# Patient Record
Sex: FEMALE | Race: WHITE | NOT HISPANIC OR LATINO | Employment: OTHER | URBAN - METROPOLITAN AREA
[De-identification: names, ages, dates, MRNs, and addresses within clinical notes are randomized per-mention and may not be internally consistent; named-entity substitution may affect disease eponyms.]

---

## 2018-10-25 ENCOUNTER — TELEPHONE (OUTPATIENT)
Dept: CARDIOLOGY | Facility: CLINIC | Age: 68
End: 2018-10-25

## 2018-10-25 NOTE — TELEPHONE ENCOUNTER
----- Message from Erich Swann sent at 10/25/2018  2:36 PM CDT -----  Contact: Breanne  Type: Needs Medical Advice    Who Called:  Patient  Best Call Back Number: [phone]  Additional Information: Patient would like to see Dr. Subramanian as new patients.

## 2018-11-05 ENCOUNTER — TELEPHONE (OUTPATIENT)
Dept: CARDIOLOGY | Facility: CLINIC | Age: 68
End: 2018-11-05

## 2018-11-05 ENCOUNTER — OFFICE VISIT (OUTPATIENT)
Dept: CARDIOLOGY | Facility: CLINIC | Age: 68
End: 2018-11-05
Payer: COMMERCIAL

## 2018-11-05 VITALS
DIASTOLIC BLOOD PRESSURE: 65 MMHG | SYSTOLIC BLOOD PRESSURE: 126 MMHG | HEART RATE: 78 BPM | BODY MASS INDEX: 38.67 KG/M2 | WEIGHT: 218.25 LBS | HEIGHT: 63 IN

## 2018-11-05 DIAGNOSIS — R06.02 SOB (SHORTNESS OF BREATH): Primary | ICD-10-CM

## 2018-11-05 DIAGNOSIS — Z00.00 ROUTINE CHECK-UP: ICD-10-CM

## 2018-11-05 DIAGNOSIS — R06.02 SHORTNESS OF BREATH: ICD-10-CM

## 2018-11-05 DIAGNOSIS — Z00.00 ROUTINE CHECK-UP: Primary | ICD-10-CM

## 2018-11-05 DIAGNOSIS — E78.00 HYPERCHOLESTEROLEMIA: ICD-10-CM

## 2018-11-05 DIAGNOSIS — R01.1 UNDIAGNOSED CARDIAC MURMURS: ICD-10-CM

## 2018-11-05 DIAGNOSIS — E66.01 SEVERE OBESITY (BMI 35.0-39.9) WITH COMORBIDITY: ICD-10-CM

## 2018-11-05 DIAGNOSIS — I10 ESSENTIAL HYPERTENSION: ICD-10-CM

## 2018-11-05 PROCEDURE — 1101F PT FALLS ASSESS-DOCD LE1/YR: CPT | Mod: CPTII,S$GLB,, | Performed by: INTERNAL MEDICINE

## 2018-11-05 PROCEDURE — 3078F DIAST BP <80 MM HG: CPT | Mod: CPTII,S$GLB,, | Performed by: INTERNAL MEDICINE

## 2018-11-05 PROCEDURE — 99999 PR PBB SHADOW E&M-EST. PATIENT-LVL IV: CPT | Mod: PBBFAC,,, | Performed by: INTERNAL MEDICINE

## 2018-11-05 PROCEDURE — 99204 OFFICE O/P NEW MOD 45 MIN: CPT | Mod: S$GLB,,, | Performed by: INTERNAL MEDICINE

## 2018-11-05 PROCEDURE — 3074F SYST BP LT 130 MM HG: CPT | Mod: CPTII,S$GLB,, | Performed by: INTERNAL MEDICINE

## 2018-11-05 PROCEDURE — 93000 ELECTROCARDIOGRAM COMPLETE: CPT | Mod: S$GLB,,, | Performed by: INTERNAL MEDICINE

## 2018-11-05 RX ORDER — FLUTICASONE FUROATE AND VILANTEROL 100; 25 UG/1; UG/1
1 POWDER RESPIRATORY (INHALATION) NIGHTLY PRN
COMMUNITY
Start: 2018-09-26

## 2018-11-05 RX ORDER — ASCORBIC ACID 500 MG
500 TABLET ORAL DAILY
COMMUNITY

## 2018-11-05 RX ORDER — NITROGLYCERIN 0.4 MG/1
0.4 TABLET SUBLINGUAL EVERY 5 MIN PRN
Qty: 25 TABLET | Refills: 0 | Status: SHIPPED | OUTPATIENT
Start: 2018-11-05 | End: 2021-07-09

## 2018-11-05 RX ORDER — FLUTICASONE PROPIONATE 50 MCG
2 SPRAY, SUSPENSION (ML) NASAL DAILY PRN
COMMUNITY
Start: 2018-03-20

## 2018-11-05 NOTE — PROGRESS NOTES
Subjective:    Patient ID:  Breanne Gregory is a 68 y.o. female who presents for Hypertension; Hyperlipidemia; and Shortness of Breath        Hypertension   This is a chronic problem. The current episode started more than 1 year ago. The problem is controlled. Associated symptoms include shortness of breath. Pertinent negatives include no blurred vision, chest pain, malaise/fatigue, orthopnea, palpitations or PND. Past treatments include diuretics and calcium channel blockers. The current treatment provides significant improvement. Compliance problems include diet.    Hyperlipidemia   This is a chronic problem. Associated symptoms include shortness of breath. Pertinent negatives include no chest pain or focal weakness. Current antihyperlipidemic treatment includes statins.   Shortness of Breath   This is a chronic problem. The current episode started more than 1 year ago. The problem has been gradually worsening. Pertinent negatives include no abdominal pain, chest pain, claudication, hemoptysis, leg swelling, orthopnea, PND, syncope, vomiting or wheezing. The symptoms are aggravated by exercise.     NEW PATIENT EVALUATION, H/O HTN, HYPERLIPIDEMIA, AND DM, RECENT LABS AT [facility], HAS BEEN SOB,  HAD CARDIAC CATH IN [place] , WAS OK, SEE ROS    Past Medical History:   Diagnosis Date    Asthma, chronic     Combined hyperlipidemia associated with type 2 diabetes mellitus     DM (diabetes mellitus)     Gastroparesis     HTN (hypertension), benign     Hypercholesteremia     Unspecified disorder of kidney and ureter     kidney stones     Past Surgical History:   Procedure Laterality Date    APPENDECTOMY      CARDIAC CATHETERIZATION  2006    CARPAL TUNNEL RELEASE Left 2013    Left CTR    CARPAL TUNNEL RELEASE Left 2007    Left CTR    CARPAL TUNNEL RELEASE Right 2007    Right CTR    carpel tunnel       SECTION      COLONOSCOPY      NICHO    COLONOSCOPY W/ BIOPSIES       precancer, repeat 2013 Miguel    ELBOW SURGERY Left 06/11/2013    Left Ulnar Nerve Transposition    JOINT REPLACEMENT Right 06/30/2015    Right TKA    JOINT REPLACEMENT Left 08/25/2008    Left TKA    lip surgery      skin cancer    SHOULDER ARTHROSCOPY Right 06/15/2004    Right Shoulder Scope    THYROID SURGERY      excision cyst    TONSILLECTOMY      TOTAL ABDOMINAL HYSTERECTOMY  age 49    ovaries remain done for benign reasons    TOTAL KNEE ARTHROPLASTY  2008    tvt       History reviewed. No pertinent family history.  Social History     Socioeconomic History    Marital status:      Spouse name: None    Number of children: None    Years of education: None    Highest education level: None   Social Needs    Financial resource strain: None    Food insecurity - worry: None    Food insecurity - inability: None    Transportation needs - medical: None    Transportation needs - non-medical: None   Occupational History    None   Tobacco Use    Smoking status: Never Smoker    Smokeless tobacco: Never Used   Substance and Sexual Activity    Alcohol use: No    Drug use: No    Sexual activity: Yes     Partners: Male     Birth control/protection: Post-menopausal, Surgical   Other Topics Concern    None   Social History Narrative    None       Review of patient's allergies indicates:  No Known Allergies    Current Outpatient Medications:     ALBUTEROL INHL, Inhale 2 puffs into the lungs daily as needed., Disp: , Rfl:     ascorbic acid, vitamin C, (VITAMIN C) 500 MG tablet, Take 500 mg by mouth once daily., Disp: , Rfl:     biotin 1 mg Cap, Take 1 mg by mouth once daily., Disp: , Rfl:     diphenoxylate-atropine 2.5-0.025 mg (LOMOTIL) 2.5-0.025 mg per tablet, , Disp: , Rfl: 0    fluticasone (FLONASE ALLERGY RELIEF) 50 mcg/actuation nasal spray, 2 sprays by Nasal route., Disp: , Rfl:     fluticasone-vilanterol (BREO ELLIPTA) 100-25 mcg/dose diskus inhaler, Inhale 1 puff into the lungs., Disp: ,  Rfl:     glimepiride (AMARYL) 4 MG tablet, Take 1 tablet by mouth once daily., Disp: , Rfl: 0    GUAIFENESIN AC  mg/5 mL liquid, , Disp: , Rfl: 0    hyoscyamine (LEVSIN/SL) 0.125 mg Subl, Place 1 tablet under the tongue once daily., Disp: , Rfl: 0    lovastatin (MEVACOR) 40 MG tablet, , Disp: , Rfl: 0    metoclopramide HCl (REGLAN) 5 MG tablet, Take 5 mg by mouth 4 (four) times daily., Disp: , Rfl: 0    omeprazole (PRILOSEC) 20 MG capsule, Take 20 mg by mouth once daily.  , Disp: , Rfl:     PROAIR HFA 90 mcg/actuation inhaler, , Disp: , Rfl: 0    triamterene-hydrochlorothiazide 37.5-25 mg (DYAZIDE) 37.5-25 mg per capsule, Take 1 capsule by mouth Daily., Disp: , Rfl: 0    verapamil (CALAN-SR) 240 MG CR tablet, Take 1 tablet by mouth once daily., Disp: , Rfl: 0    VICTOZA 3-ADRIA 0.6 mg/0.1 mL (18 mg/3 mL) PnIj, , Disp: , Rfl: 0    vitamin D 1000 units Tab, Take 185 mg by mouth once daily., Disp: , Rfl:     hydrocodone-acetaminophen 10-325mg (NORCO)  mg Tab, Take 1 tablet by mouth every 12 (twelve) hours as needed., Disp: 40 tablet, Rfl: 0    hydrOXYzine HCl (ATARAX) 10 MG Tab, Take 3 tablets by mouth nightly., Disp: , Rfl: 0    nitroGLYCERIN (NITROSTAT) 0.4 MG SL tablet, Place 1 tablet (0.4 mg total) under the tongue every 5 (five) minutes as needed., Disp: 25 tablet, Rfl: 0    phenazopyridine (PYRIDIUM) 200 MG tablet, Take 1 tablet by mouth once daily., Disp: , Rfl: 0    VESICARE 5 mg tablet, Take 5 mg by mouth once daily., Disp: , Rfl: 3    Review of Systems   Constitution: Negative for chills, diaphoresis, weakness, malaise/fatigue and night sweats.   HENT: Negative for congestion, hearing loss and nosebleeds.    Eyes: Negative for blurred vision and visual disturbance.   Cardiovascular: Positive for dyspnea on exertion. Negative for chest pain, claudication, cyanosis, irregular heartbeat, leg swelling, near-syncope, orthopnea, palpitations, paroxysmal nocturnal dyspnea and syncope.  "  Respiratory: Positive for shortness of breath. Negative for cough (CHRONIC, ASTHMA), hemoptysis and wheezing.    Endocrine: Negative for cold intolerance, heat intolerance and polyuria.   Hematologic/Lymphatic: Negative for adenopathy. Does not bruise/bleed easily.   Skin: Negative for color change, itching and nail changes.   Musculoskeletal: Negative for back pain, falls, joint pain and stiffness (SOME).   Gastrointestinal: Negative for abdominal pain, change in bowel habit, dysphagia, heartburn, hematemesis, jaundice, melena and vomiting.        GASTROPARESIS, EGD   Genitourinary: Negative for dysuria, flank pain and frequency.   Neurological: Negative for brief paralysis, dizziness, focal weakness, light-headedness, loss of balance, numbness and tremors.   Psychiatric/Behavioral: Negative for altered mental status, depression and memory loss. The patient is not nervous/anxious.    Allergic/Immunologic: Negative for hives and persistent infections.        Objective:      Vitals:    11/05/18 0953   BP: 126/65   Pulse: 78   Weight: 99 kg (218 lb 4.1 oz)   Height: 5' 3" (1.6 m)     Body mass index is 38.66 kg/m².    Physical Exam   Constitutional: She is oriented to person, place, and time. She appears well-developed and well-nourished. She is active.   OBESE   HENT:   Head: Normocephalic and atraumatic.   Mouth/Throat: Oropharynx is clear and moist and mucous membranes are normal.   Eyes: Conjunctivae and EOM are normal. Pupils are equal, round, and reactive to light.   Neck: Trachea normal and normal range of motion. Neck supple. Normal carotid pulses, no hepatojugular reflux and no JVD present. Carotid bruit is not present. No tracheal deviation, no edema and no erythema present. No thyromegaly present.   Cardiovascular: Normal rate and regular rhythm.  No extrasystoles are present. PMI is not displaced. Exam reveals no gallop and no friction rub.   Murmur heard.   Systolic murmur is present with a grade of 1/6 " at the lower left sternal border.  Pulses:       Carotid pulses are 2+ on the right side, and 2+ on the left side.       Radial pulses are 2+ on the right side, and 2+ on the left side.        Femoral pulses are 2+ on the right side, and 2+ on the left side.       Dorsalis pedis pulses are 2+ on the right side, and 2+ on the left side.        Posterior tibial pulses are 2+ on the right side, and 2+ on the left side.   Pulmonary/Chest: Effort normal and breath sounds normal. No tachypnea and no bradypnea. She has no wheezes. She has no rales. She exhibits no tenderness.   Abdominal: Soft. Bowel sounds are normal. She exhibits no distension and no mass. There is no hepatosplenomegaly. There is no tenderness. There is no guarding and no CVA tenderness.   Musculoskeletal: Normal range of motion. She exhibits no edema or tenderness.   VARICOSE VEINS, HAD LASER DR PHAM   Lymphadenopathy:     She has no cervical adenopathy.   Neurological: She is alert and oriented to person, place, and time. She has normal strength. She displays no tremor. No cranial nerve deficit. She exhibits normal muscle tone. Coordination normal.   Skin: Skin is warm and dry. No rash noted. No cyanosis or erythema. No pallor.   Psychiatric: She has a normal mood and affect. Her speech is normal and behavior is normal.               ..    Chemistry        Component Value Date/Time     06/16/2015 0950    K 4.3 06/16/2015 0950     06/16/2015 0950    CO2 28 06/16/2015 0950    BUN 22 (H) 06/16/2015 0950    CREATININE 1.04 06/16/2015 0950    GLU 97 06/16/2015 0950        Component Value Date/Time    CALCIUM 10.8 (H) 06/16/2015 0950            ..No results found for: CHOL  No results found for: HDL  No results found for: LDLCALC  No results found for: TRIG  No results found for: CHOLHDL  ..  Lab Results   Component Value Date    WBC 8.8 06/16/2015    HGB 13.5 06/16/2015    HCT 41.5 06/16/2015    MCV 89.8 06/16/2015     06/16/2015        Test(s) Reviewed  I have reviewed the following in detail:  [] Stress test   [] Angiography   [] Echocardiogram   [] Labs   [] Other:       Assessment:         ICD-10-CM ICD-9-CM   1. SOB (shortness of breath) R06.02 786.05   2. Undiagnosed cardiac murmurs R01.1 785.2   3. Essential hypertension I10 401.9   4. Routine check-up Z00.00 V70.0   5. Hypercholesterolemia E78.00 272.0   6. Shortness of breath R06.02 786.05   7. Severe obesity (BMI 35.0-39.9) with comorbidity E66.01 278.01     Problem List Items Addressed This Visit        Cardiac/Vascular    Hypercholesterolemia    Undiagnosed cardiac murmurs    Relevant Orders    Echocardiogram stress test with CFD (Cupid Only)    Essential hypertension       Endocrine    Severe obesity (BMI 35.0-39.9) with comorbidity       Other    Shortness of breath - Primary    Relevant Orders    CT Cardiac Scoring    Routine check-up           Plan:     EKG SR, LOW QRS VOLTAGE, WIILL NEED EVALUATION, IN VIEW OF SX, RF'S, CHECK STRESS ECHO, CA SCORE, CONSIDER ARB WITH DM, ASA, PRN SL NTG,ALL OTHER  CV CLINICALLY STABLE, , NO HF, NO TIA, NO CLINICAL ARRHYTHMIA,CONTINUE CURRENT MEDS, EDUCATION, DIET, EXERCISE, WEIGHT LOSS      SOB (shortness of breath)  -     Echocardiogram stress test with CFD (Cupid Only); Future    Undiagnosed cardiac murmurs  -     Echocardiogram stress test with CFD (Cupid Only); Future    Essential hypertension    Routine check-up  -     SCHEDULED EKG 12-LEAD (to Muse)    Hypercholesterolemia    Shortness of breath  -     CT Cardiac Scoring; Future; Expected date: 11/05/2018    Severe obesity (BMI 35.0-39.9) with comorbidity    Other orders  -     nitroGLYCERIN (NITROSTAT) 0.4 MG SL tablet; Place 1 tablet (0.4 mg total) under the tongue every 5 (five) minutes as needed.  Dispense: 25 tablet; Refill: 0    RTC Low level/low impact aerobic exercise 5x's/wk. Heart healthy diet and risk factor modification.    See labs and med orders.    Aerobic exercise  5x's/wk. Heart healthy diet and risk factor modification.    See labs and med orders.

## 2018-11-05 NOTE — TELEPHONE ENCOUNTER
----- Message from Asuncion Britton sent at 11/5/2018  1:21 PM CST -----  Contact: self   Patient need to speak with nurse Dilcia, she has some questions. Please call back at [phone] (home)

## 2018-11-07 ENCOUNTER — TELEPHONE (OUTPATIENT)
Dept: CARDIOLOGY | Facility: CLINIC | Age: 68
End: 2018-11-07

## 2018-11-07 NOTE — TELEPHONE ENCOUNTER
----- Message from Erich Swann sent at 11/7/2018  1:34 PM CST -----  Contact: Patient  Type: Needs Medical Advice    Who Called:  Patient  Best Call Back Number: [phone]  Additional Information: Patient would like to know if medical records were received. Please call to advise. Thanks!

## 2018-11-13 ENCOUNTER — TELEPHONE (OUTPATIENT)
Dept: CARDIOLOGY | Facility: CLINIC | Age: 68
End: 2018-11-13

## 2018-11-13 NOTE — TELEPHONE ENCOUNTER
Advised patient that we have received records from endo doctor. Patient verbalized understanding and stated will follow up with other doctors offices to re-send records.

## 2018-11-13 NOTE — TELEPHONE ENCOUNTER
----- Message from Jalyn Mccabe sent at 11/13/2018  8:17 AM CST -----  Contact: Self  Patient just wants to know if her endo doctor faxed over the labs, she has copies so if not she can fax it.  Call back at [phone] (home).  Thanks

## 2018-11-16 ENCOUNTER — CLINICAL SUPPORT (OUTPATIENT)
Dept: CARDIOLOGY | Facility: CLINIC | Age: 68
End: 2018-11-16
Attending: INTERNAL MEDICINE
Payer: COMMERCIAL

## 2018-11-16 ENCOUNTER — HOSPITAL ENCOUNTER (OUTPATIENT)
Dept: RADIOLOGY | Facility: HOSPITAL | Age: 68
Discharge: HOME | End: 2018-11-16
Attending: INTERNAL MEDICINE
Payer: COMMERCIAL

## 2018-11-16 VITALS — WEIGHT: 218 LBS | BODY MASS INDEX: 38.62 KG/M2 | HEIGHT: 63 IN

## 2018-11-16 DIAGNOSIS — R06.02 SHORTNESS OF BREATH: ICD-10-CM

## 2018-11-16 DIAGNOSIS — R06.02 SOB (SHORTNESS OF BREATH): ICD-10-CM

## 2018-11-16 DIAGNOSIS — R01.1 UNDIAGNOSED CARDIAC MURMURS: ICD-10-CM

## 2018-11-16 LAB
ASCENDING AORTA: 2.37 CM
AV MEAN GRADIENT: 4.45 MMHG
AV PEAK GRADIENT: 7.73 MMHG
AV VALVE AREA: 1.79 CM2
BSA FOR ECHO PROCEDURE: 2.1 M2
CV ECHO LV RWT: 0.29 CM
CV STRESS BASE HR: 78
DIASTOLIC BLOOD PRESSURE: 69
DOP CALC AO PEAK VEL: 1.39 M/S
DOP CALC AO VTI: 37.7 CM
DOP CALC LVOT AREA: 2.98 CM2
DOP CALC LVOT DIAMETER: 1.95 CM
DOP CALC LVOT STROKE VOLUME: 67.46 CM3
DOP CALCLVOT PEAK VEL VTI: 22.6 CM
E WAVE DECELERATION TIME: 200.46 MSEC
E/A RATIO: 0.63
E/E' RATIO: 5.89
ECHO LV POSTERIOR WALL: 0.66 CM (ref 0.6–1.1)
FRACTIONAL SHORTENING: 45 % (ref 28–44)
INTERVENTRICULAR SEPTUM: 1.1 CM (ref 0.6–1.1)
IVRT: 0.1 MSEC
LA MAJOR: 4.16 CM
LA MINOR: 4.66 CM
LA WIDTH: 3.36 CM
LEFT ATRIUM SIZE: 4.07 CM
LEFT ATRIUM VOLUME INDEX: 24.3 ML/M2
LEFT ATRIUM VOLUME: 51.1 CM3
LEFT INTERNAL DIMENSION IN SYSTOLE: 2.54 CM (ref 2.1–4)
LEFT VENTRICLE DIASTOLIC VOLUME INDEX: 45.8 ML/M2
LEFT VENTRICLE DIASTOLIC VOLUME: 96.19 ML
LEFT VENTRICLE MASS INDEX: 63.2 G/M2
LEFT VENTRICLE SYSTOLIC VOLUME INDEX: 11 ML/M2
LEFT VENTRICLE SYSTOLIC VOLUME: 23.17 ML
LEFT VENTRICULAR INTERNAL DIMENSION IN DIASTOLE: 4.58 CM (ref 3.5–6)
LEFT VENTRICULAR MASS: 132.7 G
LV LATERAL E/E' RATIO: 4.31
LV SEPTAL E/E' RATIO: 9.33
MV PEAK A VEL: 0.89 M/S
MV PEAK E VEL: 0.56 M/S
OHS CV CPX 1 MINUTE RECOVERY HEART RATE: 111 BPM
OHS CV CPX 85 PERCENT MAX PREDICTED HEART RATE MALE: 123
OHS CV CPX MAX PREDICTED HEART RATE: 145
OHS CV CPX PATIENT IS FEMALE: 1
OHS CV CPX PATIENT IS MALE: 0
OHS CV CPX PEAK DIASTOLIC BLOOD PRESSURE: 69 MMHG
OHS CV CPX PEAK HEAR RATE: 129
OHS CV CPX PEAK RATE PRESSURE PRODUCT: NORMAL
OHS CV CPX PEAK SYSTOLIC BLOOD PRESSURE: 156
OHS CV CPX PERCENT MAX PREDICTED HEART RATE ACHIEVED: 89
OHS CV CPX PERCENT TARGET HEART RATE ACHIEVED: 104.88
OHS CV CPX RATE PRESSURE PRODUCT PRESENTING: NORMAL
OHS CV CPX TARGET HEART RATE: 123
PISA TR MAX VEL: 2.42 M/S
PULM VEIN S/D RATIO: 1.47
PV PEAK D VEL: 0.34 M/S
PV PEAK S VEL: 0.5 M/S
RA MAJOR: 3.58 CM
RA PRESSURE: 3 MMHG
RA WIDTH: 3.9 CM
RIGHT VENTRICULAR END-DIASTOLIC DIMENSION: 3.78 CM
SINUS: 2.1 CM
STJ: 2.01 CM
SYSTOLIC BLOOD PRESSURE: 156
TDI LATERAL: 0.13
TDI SEPTAL: 0.06
TDI: 0.1
TR MAX PG: 23.43 MMHG
TRICUSPID ANNULAR PLANE SYSTOLIC EXCURSION: 2.5 CM
TV REST PULMONARY ARTERY PRESSURE: 26.43 MMHG

## 2018-11-16 PROCEDURE — 93325 DOPPLER ECHO COLOR FLOW MAPG: CPT | Mod: S$GLB,,, | Performed by: INTERNAL MEDICINE

## 2018-11-16 PROCEDURE — 93351 STRESS TTE COMPLETE: CPT | Mod: S$GLB,,, | Performed by: INTERNAL MEDICINE

## 2018-11-16 PROCEDURE — 93320 DOPPLER ECHO COMPLETE: CPT | Mod: S$GLB,,, | Performed by: INTERNAL MEDICINE

## 2018-11-16 PROCEDURE — 99999 PR PBB SHADOW E&M-EST. PATIENT-LVL I: CPT | Mod: PBBFAC,,,

## 2018-11-16 PROCEDURE — 75571 CT HRT W/O DYE W/CA TEST: CPT | Mod: TC,PO

## 2018-11-16 PROCEDURE — 75571 CT HRT W/O DYE W/CA TEST: CPT | Mod: 26,,, | Performed by: RADIOLOGY

## 2018-11-19 ENCOUNTER — TELEPHONE (OUTPATIENT)
Dept: CARDIOLOGY | Facility: CLINIC | Age: 68
End: 2018-11-19

## 2018-11-20 ENCOUNTER — TELEPHONE (OUTPATIENT)
Dept: CARDIOLOGY | Facility: CLINIC | Age: 68
End: 2018-11-20

## 2018-11-20 NOTE — TELEPHONE ENCOUNTER
----- Message from Sharmaine Grigsby MD sent at 11/19/2018  7:29 PM CST -----  ABN TESTS NEEDS F/U IN 2-3 WEEKS

## 2018-12-10 ENCOUNTER — LAB VISIT (OUTPATIENT)
Dept: LAB | Facility: HOSPITAL | Age: 68
End: 2018-12-10
Attending: INTERNAL MEDICINE
Payer: COMMERCIAL

## 2018-12-10 ENCOUNTER — OFFICE VISIT (OUTPATIENT)
Dept: CARDIOLOGY | Facility: CLINIC | Age: 68
End: 2018-12-10
Payer: COMMERCIAL

## 2018-12-10 VITALS
WEIGHT: 219.56 LBS | HEIGHT: 63 IN | SYSTOLIC BLOOD PRESSURE: 122 MMHG | BODY MASS INDEX: 38.9 KG/M2 | DIASTOLIC BLOOD PRESSURE: 64 MMHG | HEART RATE: 82 BPM

## 2018-12-10 DIAGNOSIS — R06.02 SHORTNESS OF BREATH: ICD-10-CM

## 2018-12-10 DIAGNOSIS — I10 ESSENTIAL HYPERTENSION: ICD-10-CM

## 2018-12-10 DIAGNOSIS — I10 HTN (HYPERTENSION), BENIGN: ICD-10-CM

## 2018-12-10 DIAGNOSIS — E78.00 HYPERCHOLESTEROLEMIA: ICD-10-CM

## 2018-12-10 DIAGNOSIS — I34.0 NON-RHEUMATIC MITRAL REGURGITATION: ICD-10-CM

## 2018-12-10 DIAGNOSIS — R94.39 ABNORMAL STRESS ECHOCARDIOGRAM: ICD-10-CM

## 2018-12-10 DIAGNOSIS — R93.1 AGATSTON CAC SCORE, >400: Primary | ICD-10-CM

## 2018-12-10 DIAGNOSIS — R93.1 AGATSTON CAC SCORE, >400: ICD-10-CM

## 2018-12-10 LAB
ALBUMIN SERPL BCP-MCNC: 3.9 G/DL
ALP SERPL-CCNC: 84 U/L
ALT SERPL W/O P-5'-P-CCNC: 19 U/L
ANION GAP SERPL CALC-SCNC: 8 MMOL/L
AST SERPL-CCNC: 24 U/L
BILIRUB SERPL-MCNC: 0.5 MG/DL
BUN SERPL-MCNC: 19 MG/DL
CALCIUM SERPL-MCNC: 9.9 MG/DL
CHLORIDE SERPL-SCNC: 104 MMOL/L
CHOLEST SERPL-MCNC: 143 MG/DL
CHOLEST/HDLC SERPL: 2.4 {RATIO}
CO2 SERPL-SCNC: 25 MMOL/L
CREAT SERPL-MCNC: 1 MG/DL
EST. GFR  (AFRICAN AMERICAN): >60 ML/MIN/1.73 M^2
EST. GFR  (NON AFRICAN AMERICAN): 57.6 ML/MIN/1.73 M^2
GLUCOSE SERPL-MCNC: 88 MG/DL
HDLC SERPL-MCNC: 60 MG/DL
HDLC SERPL: 42 %
LDLC SERPL CALC-MCNC: 56.8 MG/DL
NONHDLC SERPL-MCNC: 83 MG/DL
POTASSIUM SERPL-SCNC: 3.9 MMOL/L
PROT SERPL-MCNC: 7.3 G/DL
SODIUM SERPL-SCNC: 137 MMOL/L
TRIGL SERPL-MCNC: 131 MG/DL

## 2018-12-10 PROCEDURE — 3074F SYST BP LT 130 MM HG: CPT | Mod: CPTII,S$GLB,, | Performed by: INTERNAL MEDICINE

## 2018-12-10 PROCEDURE — 36415 COLL VENOUS BLD VENIPUNCTURE: CPT | Mod: PO

## 2018-12-10 PROCEDURE — 80053 COMPREHEN METABOLIC PANEL: CPT

## 2018-12-10 PROCEDURE — 99999 PR PBB SHADOW E&M-EST. PATIENT-LVL V: CPT | Mod: PBBFAC,,, | Performed by: INTERNAL MEDICINE

## 2018-12-10 PROCEDURE — 80061 LIPID PANEL: CPT

## 2018-12-10 PROCEDURE — 1101F PT FALLS ASSESS-DOCD LE1/YR: CPT | Mod: CPTII,S$GLB,, | Performed by: INTERNAL MEDICINE

## 2018-12-10 PROCEDURE — 99214 OFFICE O/P EST MOD 30 MIN: CPT | Mod: S$GLB,,, | Performed by: INTERNAL MEDICINE

## 2018-12-10 PROCEDURE — 3078F DIAST BP <80 MM HG: CPT | Mod: CPTII,S$GLB,, | Performed by: INTERNAL MEDICINE

## 2018-12-10 RX ORDER — SODIUM CHLORIDE 9 MG/ML
INJECTION, SOLUTION INTRAVENOUS ONCE
Status: CANCELLED | OUTPATIENT
Start: 2018-12-10 | End: 2018-12-10

## 2018-12-10 RX ORDER — SODIUM CHLORIDE 0.9 % (FLUSH) 0.9 %
5 SYRINGE (ML) INJECTION
Status: CANCELLED | OUTPATIENT
Start: 2018-12-10

## 2018-12-10 RX ORDER — CHOLESTYRAMINE 4 G/9G
POWDER, FOR SUSPENSION ORAL
COMMUNITY
Start: 2018-11-06 | End: 2021-06-01

## 2018-12-10 RX ORDER — LOSARTAN POTASSIUM 25 MG/1
25 TABLET ORAL DAILY
COMMUNITY
Start: 2018-11-15 | End: 2018-12-10 | Stop reason: SDUPTHER

## 2018-12-10 RX ORDER — LOSARTAN POTASSIUM 25 MG/1
25 TABLET ORAL DAILY
Qty: 30 TABLET | Refills: 1 | Status: SHIPPED | OUTPATIENT
Start: 2018-12-10 | End: 2019-02-22 | Stop reason: DRUGHIGH

## 2018-12-10 RX ORDER — INSULIN ASPART 100 [IU]/ML
INJECTION, SOLUTION INTRAVENOUS; SUBCUTANEOUS
COMMUNITY
End: 2021-06-16 | Stop reason: CLARIF

## 2018-12-10 RX ORDER — NAPROXEN SODIUM 220 MG/1
81 TABLET, FILM COATED ORAL NIGHTLY
COMMUNITY

## 2018-12-10 NOTE — PROGRESS NOTES
Subjective:    Patient ID:  Breanne Gregory is a 68 y.o. female who presents for Follow-up (Stress, Echo and CT score)        HPI  DISCUSSED AMANDA AND GOALS, ECHO MOD MR,DIASTOLIC DYSFUNCTION,  ABN STRESS, CA SCORE 922, STILL WITH COUGH OFF LISINOPRIL, SEE ROS    Past Medical History:   Diagnosis Date    Asthma, chronic     Combined hyperlipidemia associated with type 2 diabetes mellitus     DM (diabetes mellitus)     Gastroparesis     HTN (hypertension), benign     Hypercholesteremia     Unspecified disorder of kidney and ureter     kidney stones     Past Surgical History:   Procedure Laterality Date    APPENDECTOMY      CARDIAC CATHETERIZATION  2006    CARPAL TUNNEL RELEASE Left 2013    Left CTR    CARPAL TUNNEL RELEASE Left 2007    Left CTR    CARPAL TUNNEL RELEASE Right 2007    Right CTR    carpel tunnel       SECTION      COLONOSCOPY      NICHO    COLONOSCOPY W/ BIOPSIES  2011    precancer, repeat  N[facility]o    ELBOW SURGERY Left 2013    Left Ulnar Nerve Transposition    JOINT REPLACEMENT Right 2015    Right TKA    JOINT REPLACEMENT Left 2008    Left TKA    lip surgery      skin cancer    SHOULDER ARTHROSCOPY Right 06/15/2004    Right Shoulder Scope    THYROID SURGERY      excision cyst    TONSILLECTOMY      TOTAL ABDOMINAL HYSTERECTOMY  age 49    ovaries remain done for benign reasons    TOTAL KNEE ARTHROPLASTY  2008    tvt       No family history on file.  Social History     Socioeconomic History    Marital status:      Spouse name: Not on file    Number of children: Not on file    Years of education: Not on file    Highest education level: Not on file   Social Needs    Financial resource strain: Not on file    Food insecurity - worry: Not on file    Food insecurity - inability: Not on file    Transportation needs - medical: Not on file    Transportation needs - non-medical: Not on file   Occupational History    Not on  file   Tobacco Use    Smoking status: Never Smoker    Smokeless tobacco: Never Used   Substance and Sexual Activity    Alcohol use: No    Drug use: No    Sexual activity: Yes     Partners: Male     Birth control/protection: Post-menopausal, Surgical   Other Topics Concern    Not on file   Social History Narrative    Not on file       Review of patient's allergies indicates:  No Known Allergies    Current Outpatient Medications:     ALBUTEROL INHL, Inhale 2 puffs into the lungs daily as needed., Disp: , Rfl:     ascorbic acid, vitamin C, (VITAMIN C) 500 MG tablet, Take 500 mg by mouth once daily., Disp: , Rfl:     aspirin 81 MG Chew, Take 81 mg by mouth once daily., Disp: , Rfl:     biotin 1 mg Cap, Take 1 mg by mouth once daily., Disp: , Rfl:     cholestyramine (QUESTRAN) 4 gram packet, mix ONE packet in 8oz of liquid ONCE DAILY IN THE MORNING AS NEEDED for diarrhea, Disp: , Rfl:     diphenoxylate-atropine 2.5-0.025 mg (LOMOTIL) 2.5-0.025 mg per tablet, , Disp: , Rfl: 0    fluticasone (FLONASE ALLERGY RELIEF) 50 mcg/actuation nasal spray, 2 sprays by Nasal route., Disp: , Rfl:     fluticasone-vilanterol (BREO ELLIPTA) 100-25 mcg/dose diskus inhaler, Inhale 1 puff into the lungs., Disp: , Rfl:     glimepiride (AMARYL) 4 MG tablet, Take 1 tablet by mouth 2 (two) times daily. , Disp: , Rfl: 0    GUAIFENESIN AC  mg/5 mL liquid, , Disp: , Rfl: 0    hydrocodone-acetaminophen 10-325mg (NORCO)  mg Tab, Take 1 tablet by mouth every 12 (twelve) hours as needed., Disp: 40 tablet, Rfl: 0    hydrOXYzine HCl (ATARAX) 10 MG Tab, Take 3 tablets by mouth nightly., Disp: , Rfl: 0    hyoscyamine (LEVSIN/SL) 0.125 mg Subl, Place 1 tablet under the tongue once daily., Disp: , Rfl: 0    insulin aspart U-100 (NOVOLOG FLEXPEN U-100 INSULIN) 100 unit/mL InPn pen, Inject into the skin., Disp: , Rfl:     losartan (COZAAR) 25 MG tablet, Take 1 tablet (25 mg total) by mouth once daily., Disp: 30 tablet, Rfl:  1    lovastatin (MEVACOR) 40 MG tablet, , Disp: , Rfl: 0    metoclopramide HCl (REGLAN) 5 MG tablet, Take 5 mg by mouth 2 (two) times daily. , Disp: , Rfl: 0    nitroGLYCERIN (NITROSTAT) 0.4 MG SL tablet, Place 1 tablet (0.4 mg total) under the tongue every 5 (five) minutes as needed., Disp: 25 tablet, Rfl: 0    omeprazole (PRILOSEC) 20 MG capsule, Take 20 mg by mouth once daily.  , Disp: , Rfl:     phenazopyridine (PYRIDIUM) 200 MG tablet, Take 1 tablet by mouth once daily., Disp: , Rfl: 0    PROAIR HFA 90 mcg/actuation inhaler, , Disp: , Rfl: 0    triamterene-hydrochlorothiazide 37.5-25 mg (DYAZIDE) 37.5-25 mg per capsule, Take 1 capsule by mouth Daily., Disp: , Rfl: 0    verapamil (CALAN-SR) 240 MG CR tablet, Take 1 tablet by mouth once daily., Disp: , Rfl: 0    VICTOZA 3-ADRIA 0.6 mg/0.1 mL (18 mg/3 mL) PnIj, , Disp: , Rfl: 0    vitamin D 1000 units Tab, Take 185 mg by mouth once daily., Disp: , Rfl:     Review of Systems   Constitution: Negative for chills, diaphoresis, weakness, malaise/fatigue and night sweats.   HENT: Negative for congestion, hearing loss and nosebleeds.    Eyes: Negative for blurred vision and visual disturbance.   Cardiovascular: Positive for dyspnea on exertion. Negative for chest pain, claudication, cyanosis, irregular heartbeat, leg swelling, near-syncope, orthopnea, palpitations, paroxysmal nocturnal dyspnea and syncope.   Respiratory: Negative for cough (CHRONIC, ASTHMA), hemoptysis, shortness of breath (SOME) and wheezing.    Endocrine: Negative for cold intolerance, heat intolerance and polyuria.   Hematologic/Lymphatic: Negative for adenopathy. Does not bruise/bleed easily.   Skin: Negative for color change, itching and nail changes.   Musculoskeletal: Negative for back pain, falls, joint pain and stiffness (SOME).   Gastrointestinal: Negative for abdominal pain, change in bowel habit, dysphagia, heartburn, hematemesis, jaundice, melena and vomiting.        GASTROPARESIS,  "EGD   Genitourinary: Negative for dysuria, flank pain and frequency.   Neurological: Negative for brief paralysis, dizziness, focal weakness, light-headedness, loss of balance, numbness and tremors.   Psychiatric/Behavioral: Negative for altered mental status, depression and memory loss. The patient is not nervous/anxious.    Allergic/Immunologic: Negative for environmental allergies and persistent infections.        Objective:      Vitals:    12/10/18 1002   BP: 122/64   Pulse: 82   Weight: 99.6 kg (219 lb 9.3 oz)   Height: 5' 3" (1.6 m)   PainSc: 0-No pain     Body mass index is 38.9 kg/m².    Physical Exam   Constitutional: She is oriented to person, place, and time. She appears well-developed and well-nourished. She is active.   OBESE   HENT:   Head: Normocephalic and atraumatic.   Mouth/Throat: Oropharynx is clear and moist and mucous membranes are normal.   Eyes: Conjunctivae and EOM are normal. Pupils are equal, round, and reactive to light.   Neck: Trachea normal and normal range of motion. Neck supple. Normal carotid pulses, no hepatojugular reflux and no JVD present. Carotid bruit is not present. No tracheal deviation, no edema and no erythema present. No thyromegaly present.   Cardiovascular: Normal rate and regular rhythm.  No extrasystoles are present. PMI is not displaced. Exam reveals no gallop and no friction rub.   Murmur heard.   Medium-pitched blowing holosystolic murmur is present with a grade of 1/6 at the apex.  Pulses:       Carotid pulses are 2+ on the right side, and 2+ on the left side.       Radial pulses are 2+ on the right side, and 2+ on the left side.        Femoral pulses are 2+ on the right side, and 2+ on the left side.       Dorsalis pedis pulses are 2+ on the right side, and 2+ on the left side.        Posterior tibial pulses are 2+ on the right side, and 2+ on the left side.   Pulmonary/Chest: Effort normal and breath sounds normal. No tachypnea and no bradypnea. She has no " wheezes. She has no rales. She exhibits no tenderness.   Abdominal: Soft. Bowel sounds are normal. She exhibits no distension and no mass. There is no hepatosplenomegaly. There is no tenderness. There is no guarding and no CVA tenderness.   Musculoskeletal: Normal range of motion. She exhibits no edema or tenderness.   VARICOSE VEINS,    Lymphadenopathy:     She has no cervical adenopathy.   Neurological: She is alert and oriented to person, place, and time. She has normal strength. She displays no tremor. No cranial nerve deficit.   Skin: Skin is warm and dry. No rash noted. No cyanosis or erythema. No pallor.   Psychiatric: She has a normal mood and affect. Her speech is normal and behavior is normal.               ..    Chemistry        Component Value Date/Time     12/10/2018 1057     06/16/2015 0950    K 3.9 12/10/2018 1057    K 4.3 06/16/2015 0950     12/10/2018 1057     06/16/2015 0950    CO2 25 12/10/2018 1057    BUN 19 12/10/2018 1057    CREATININE 1.0 12/10/2018 1057    CREATININE 1.04 06/16/2015 0950    GLU 88 12/10/2018 1057        Component Value Date/Time    CALCIUM 9.9 12/10/2018 1057    ALKPHOS 84 12/10/2018 1057    AST 24 12/10/2018 1057    ALT 19 12/10/2018 1057    BILITOT 0.5 12/10/2018 1057    ESTGFRAFRICA >60.0 12/10/2018 1057    EGFRNONAA 57.6 (A) 12/10/2018 1057            ..  Lab Results   Component Value Date    CHOL 143 12/10/2018     Lab Results   Component Value Date    HDL 60 12/10/2018     Lab Results   Component Value Date    LDLCALC 56.8 (L) 12/10/2018     Lab Results   Component Value Date    TRIG 131 12/10/2018     Lab Results   Component Value Date    CHOLHDL 42.0 12/10/2018     ..  Lab Results   Component Value Date    WBC 8.8 06/16/2015    HGB 13.5 06/16/2015    HCT 41.5 06/16/2015    MCV 89.8 06/16/2015     06/16/2015       Test(s) Reviewed  I have reviewed the following in detail:  [x] Stress test   [] Angiography   [x] Echocardiogram   [x] Labs    [x] Other:       Assessment:         ICD-10-CM ICD-9-CM   1. Agatston CAC score, >400 R93.1 793.2   2. Abnormal stress echocardiogram R94.39 794.39   3. Non-rheumatic mitral regurgitation I34.0 424.0   4. Shortness of breath R06.02 786.05   5. Essential hypertension I10 401.9   6. HTN (hypertension), benign I10 401.1   7. Hypercholesterolemia E78.00 272.0     Problem List Items Addressed This Visit        Cardiac/Vascular    Hypercholesterolemia    Relevant Orders    Full code    Comprehensive metabolic panel (Completed)    Lipid panel (Completed)    HTN (hypertension), benign    Relevant Orders    Full code    Comprehensive metabolic panel (Completed)    Essential hypertension    Relevant Orders    Full code    Comprehensive metabolic panel (Completed)    Agatston CAC score, >400 - Primary    Relevant Orders    Case Request-Cath Lab: Left heart cath (Completed)    Full code    Comprehensive metabolic panel (Completed)    Abnormal stress echocardiogram    Relevant Orders    Case Request-Cath Lab: Left heart cath (Completed)    Full code    Comprehensive metabolic panel (Completed)    Non-rheumatic mitral regurgitation    Relevant Orders    Full code    Comprehensive metabolic panel (Completed)       Other    Shortness of breath    Relevant Orders    Case Request-Cath Lab: Left heart cath (Completed)    Full code    Comprehensive metabolic panel (Completed)           Plan:     WILL NEED ANGIOGRAM IN VIEW OF FINDINGS AND SX,  CONSENT, ALL OTHER CV CLINICALLY STABLE,  NO HF, NO TIA, NO CLINICAL ARRHYTHMIA,CONTINUE CURRENT MEDS, EDUCATION, DIET, EXERCISE, WEIGHT LOSS, EXPLAINED PLAN TO PT AND       Agatston CAC score, >400  -     Case Request-Cath Lab: Left heart cath; Standing  -     Establish IV access and maintain saline lock; Standing  -     Hold Warfarin; Standing  -     Hold Enoxaparin/subcutaneous Heparin; Standing  -     Hold Heparin drip; Standing  -     Clip and Prep Right Radial; Standing  -     Diet  NPO; Standing  -     CBC auto differential; Standing  -     EKG 12-lead; Standing  -     Full code; Standing  -     Comprehensive metabolic panel; Future; Expected date: 12/10/2018    Abnormal stress echocardiogram  -     Case Request-Cath Lab: Left heart cath; Standing  -     Establish IV access and maintain saline lock; Standing  -     Hold Warfarin; Standing  -     Hold Enoxaparin/subcutaneous Heparin; Standing  -     Hold Heparin drip; Standing  -     Clip and Prep Right Radial; Standing  -     Diet NPO; Standing  -     CBC auto differential; Standing  -     EKG 12-lead; Standing  -     Full code; Standing  -     Comprehensive metabolic panel; Future; Expected date: 12/10/2018    Non-rheumatic mitral regurgitation  -     Establish IV access and maintain saline lock; Standing  -     Hold Warfarin; Standing  -     Hold Enoxaparin/subcutaneous Heparin; Standing  -     Hold Heparin drip; Standing  -     Clip and Prep Right Radial; Standing  -     Diet NPO; Standing  -     CBC auto differential; Standing  -     EKG 12-lead; Standing  -     Full code; Standing  -     Comprehensive metabolic panel; Future; Expected date: 12/10/2018    Shortness of breath  -     Case Request-Cath Lab: Left heart cath; Standing  -     Establish IV access and maintain saline lock; Standing  -     Hold Warfarin; Standing  -     Hold Enoxaparin/subcutaneous Heparin; Standing  -     Hold Heparin drip; Standing  -     Clip and Prep Right Radial; Standing  -     Diet NPO; Standing  -     CBC auto differential; Standing  -     EKG 12-lead; Standing  -     Full code; Standing  -     Comprehensive metabolic panel; Future; Expected date: 12/10/2018    Essential hypertension  -     Establish IV access and maintain saline lock; Standing  -     Hold Warfarin; Standing  -     Hold Enoxaparin/subcutaneous Heparin; Standing  -     Hold Heparin drip; Standing  -     Clip and Prep Right Radial; Standing  -     Diet NPO; Standing  -     CBC auto  differential; Standing  -     EKG 12-lead; Standing  -     Full code; Standing  -     Comprehensive metabolic panel; Future; Expected date: 12/10/2018    HTN (hypertension), benign  -     Establish IV access and maintain saline lock; Standing  -     Hold Warfarin; Standing  -     Hold Enoxaparin/subcutaneous Heparin; Standing  -     Hold Heparin drip; Standing  -     Clip and Prep Right Radial; Standing  -     Diet NPO; Standing  -     CBC auto differential; Standing  -     EKG 12-lead; Standing  -     Full code; Standing  -     Comprehensive metabolic panel; Future; Expected date: 12/10/2018    Hypercholesterolemia  -     Establish IV access and maintain saline lock; Standing  -     Hold Warfarin; Standing  -     Hold Enoxaparin/subcutaneous Heparin; Standing  -     Hold Heparin drip; Standing  -     Clip and Prep Right Radial; Standing  -     Diet NPO; Standing  -     CBC auto differential; Standing  -     EKG 12-lead; Standing  -     Full code; Standing  -     Comprehensive metabolic panel; Future; Expected date: 12/10/2018  -     Lipid panel; Future; Expected date: 12/10/2018    Other orders  -     sodium chloride 0.9% flush 5 mL  -     0.9%  NaCl infusion  -     losartan (COZAAR) 25 MG tablet; Take 1 tablet (25 mg total) by mouth once daily.  Dispense: 30 tablet; Refill: 1    RTC Low level/low impact aerobic exercise 5x's/wk. Heart healthy diet and risk factor modification.    See labs and med orders.    Aerobic exercise 5x's/wk. Heart healthy diet and risk factor modification.    See labs and med orders.

## 2018-12-10 NOTE — PATIENT INSTRUCTIONS
Angiogram    Arrive for procedure at: [facility] on January 10, 2019. Your procedure is scheduled for 7am     You will receive a phone call from [facility] Pre-Op Department with further instructions prior to your scheduled procedure.    Notify the nurse if you are ALLERGIC TO IODINE.    FASTING: You MAY NOT have anything to eat or drink AFTER MIDNIGHT the day before your procedure. If your procedure is scheduled in the afternoon, you may have a LIGHT BREAKFAST 6-8 hours prior to your procedure.  For example: Two slices of toast; black coffee or black tea.    MEDICATIONS: You may take your regular morning medications with water. If there are any medications that you should not take, you will be instructed to hold them for that morning.    ? CARDIOLOGY PRE-PROCEDURE MEDICATION ORDERS:  ** Please hold any medications that are checked below:    HOLD   # OF DAYS TO HOLD  ? Coumadin   Consult with Coumadin Clinic   ? Xarelto    _DAY BEFORE & DAY OF_  ? Pradaxa  _ DAY BEFORE & DAY OF _  ? Eliquis   _ DAY BEFORE & DAY OF _  ? Metformin    Day before procedure & morning of procedure  ? Short acting insulin   Morning of procedure    CONTINUE the Following Medications   ? Plavix      ? Effient     ? Aspirin    WHAT TO EXPECT:    How long will the procedure take?  The procedure will take an average of 1 - 2 hours to perform.  After the procedure, you will need to lay flat for around 4 - 6 hours to minimize bleeding from the puncture site. If the wrist is accessed you will need to keep your arm still as instructed by the nurse.    When can I go home?  You may be able to be discharged home that same afternoon if there were no complications.  If you have one of the following: balloon; stent; pacemaker or defibrillator procedures, you may spend one night for observation.  Your doctor will determine your discharge based upon your progress.  The results of your procedure will be discussed with you before you are  discharged.  Any further testing or procedures will be scheduled for you either before you leave or you will be instructed to call for a future appointment.      TRANSPORTATION:  PLEASE ARRANGE TO HAVE SOMEONE DRIVE YOU HOME FOLLOWING YOUR PROCEDURE, YOU WILL NOT BE ALLOWED TO DRIVE.

## 2018-12-17 RX ORDER — VERAPAMIL HYDROCHLORIDE 240 MG/1
240 TABLET, FILM COATED, EXTENDED RELEASE ORAL DAILY
Qty: 30 TABLET | Refills: 1 | Status: SHIPPED | OUTPATIENT
Start: 2018-12-17 | End: 2019-01-14 | Stop reason: SDUPTHER

## 2018-12-17 RX ORDER — TRIAMTERENE AND HYDROCHLOROTHIAZIDE 37.5; 25 MG/1; MG/1
1 CAPSULE ORAL DAILY
Qty: 30 CAPSULE | Refills: 1 | Status: SHIPPED | OUTPATIENT
Start: 2018-12-17 | End: 2019-01-14 | Stop reason: SDUPTHER

## 2018-12-17 NOTE — TELEPHONE ENCOUNTER
----- Message from Patria Chacon sent at 12/17/2018 12:04 PM CST -----  Type: Needs Medication ordered    Who Called:  Patient  Best Call Back Number: [phone]  Additional Information: Requesting medications: Triamterene HCTZ 37.5-25 mg, and Verapamil  mg tablets be ordered/uses [facility]/please call patient back to advise.

## 2019-01-10 ENCOUNTER — TELEPHONE (OUTPATIENT)
Dept: CARDIOLOGY | Facility: CLINIC | Age: 69
End: 2019-01-10

## 2019-01-10 NOTE — TELEPHONE ENCOUNTER
----- Message from Jeana Conti sent at 1/10/2019  2:19 PM CST -----  Please call pt at [phone] requesting to schedule a follow up appointment in 5 weeks / had an angiogram this morning / would like spouse / Albert Gregory scheduled at same time

## 2019-01-14 RX ORDER — TRIAMTERENE AND HYDROCHLOROTHIAZIDE 37.5; 25 MG/1; MG/1
CAPSULE ORAL
Qty: 30 CAPSULE | Refills: 1 | Status: SHIPPED | OUTPATIENT
Start: 2019-01-14 | End: 2019-03-18 | Stop reason: SDUPTHER

## 2019-01-14 RX ORDER — VERAPAMIL HYDROCHLORIDE 240 MG/1
TABLET, FILM COATED, EXTENDED RELEASE ORAL
Qty: 30 TABLET | Refills: 1 | Status: SHIPPED | OUTPATIENT
Start: 2019-01-14 | End: 2019-07-15 | Stop reason: SDUPTHER

## 2019-01-16 ENCOUNTER — TELEPHONE (OUTPATIENT)
Dept: CARDIOLOGY | Facility: CLINIC | Age: 69
End: 2019-01-16

## 2019-01-16 NOTE — TELEPHONE ENCOUNTER
Advised patient the call was to verify she had a f/u appt. Scheduled for 2/22/19 at 9am. Patient aware of appt time and date.

## 2019-02-04 PROBLEM — Z00.00 ROUTINE CHECK-UP: Status: RESOLVED | Noted: 2018-11-05 | Resolved: 2019-02-04

## 2019-02-22 ENCOUNTER — OFFICE VISIT (OUTPATIENT)
Dept: CARDIOLOGY | Facility: CLINIC | Age: 69
End: 2019-02-22
Payer: COMMERCIAL

## 2019-02-22 VITALS
SYSTOLIC BLOOD PRESSURE: 137 MMHG | DIASTOLIC BLOOD PRESSURE: 70 MMHG | WEIGHT: 222.88 LBS | HEART RATE: 75 BPM | BODY MASS INDEX: 39.49 KG/M2 | HEIGHT: 63 IN

## 2019-02-22 DIAGNOSIS — E66.01 SEVERE OBESITY (BMI 35.0-39.9) WITH COMORBIDITY: ICD-10-CM

## 2019-02-22 DIAGNOSIS — E78.00 HYPERCHOLESTEROLEMIA: ICD-10-CM

## 2019-02-22 DIAGNOSIS — I34.0 NON-RHEUMATIC MITRAL REGURGITATION: ICD-10-CM

## 2019-02-22 DIAGNOSIS — I25.118 CORONARY ARTERY DISEASE INVOLVING NATIVE CORONARY ARTERY OF NATIVE HEART WITH OTHER FORM OF ANGINA PECTORIS: Primary | ICD-10-CM

## 2019-02-22 DIAGNOSIS — I10 HTN (HYPERTENSION), BENIGN: ICD-10-CM

## 2019-02-22 PROBLEM — I25.10 CORONARY ARTERY DISEASE INVOLVING NATIVE CORONARY ARTERY OF NATIVE HEART: Status: ACTIVE | Noted: 2019-02-22

## 2019-02-22 PROCEDURE — 3075F PR MOST RECENT SYSTOLIC BLOOD PRESS GE 130-139MM HG: ICD-10-PCS | Mod: CPTII,S$GLB,, | Performed by: INTERNAL MEDICINE

## 2019-02-22 PROCEDURE — 1101F PR PT FALLS ASSESS DOC 0-1 FALLS W/OUT INJ PAST YR: ICD-10-PCS | Mod: CPTII,S$GLB,, | Performed by: INTERNAL MEDICINE

## 2019-02-22 PROCEDURE — 3075F SYST BP GE 130 - 139MM HG: CPT | Mod: CPTII,S$GLB,, | Performed by: INTERNAL MEDICINE

## 2019-02-22 PROCEDURE — 99214 PR OFFICE/OUTPT VISIT, EST, LEVL IV, 30-39 MIN: ICD-10-PCS | Mod: S$GLB,,, | Performed by: INTERNAL MEDICINE

## 2019-02-22 PROCEDURE — 99214 OFFICE O/P EST MOD 30 MIN: CPT | Mod: S$GLB,,, | Performed by: INTERNAL MEDICINE

## 2019-02-22 PROCEDURE — 99999 PR PBB SHADOW E&M-EST. PATIENT-LVL V: CPT | Mod: PBBFAC,,, | Performed by: INTERNAL MEDICINE

## 2019-02-22 PROCEDURE — 3078F PR MOST RECENT DIASTOLIC BLOOD PRESSURE < 80 MM HG: ICD-10-PCS | Mod: CPTII,S$GLB,, | Performed by: INTERNAL MEDICINE

## 2019-02-22 PROCEDURE — 3078F DIAST BP <80 MM HG: CPT | Mod: CPTII,S$GLB,, | Performed by: INTERNAL MEDICINE

## 2019-02-22 PROCEDURE — 99999 PR PBB SHADOW E&M-EST. PATIENT-LVL V: ICD-10-PCS | Mod: PBBFAC,,, | Performed by: INTERNAL MEDICINE

## 2019-02-22 PROCEDURE — 1101F PT FALLS ASSESS-DOCD LE1/YR: CPT | Mod: CPTII,S$GLB,, | Performed by: INTERNAL MEDICINE

## 2019-02-22 RX ORDER — LOSARTAN POTASSIUM 50 MG/1
50 TABLET ORAL NIGHTLY
Qty: 90 TABLET | Refills: 1 | Status: SHIPPED | OUTPATIENT
Start: 2019-02-22 | End: 2019-05-29 | Stop reason: SDUPTHER

## 2019-02-22 NOTE — PROGRESS NOTES
Subjective:    Patient ID:  Breanne Gregory is a 68 y.o. female who presents for Coronary Artery Disease (LHC)        HPI  S/P LHC, CAD WITH NEGATIVE IFR OF RCA, DOING OK, RECENT  URI, BP OK, DISCUSSED ANGIOGRAM, SEE ROS    Past Medical History:   Diagnosis Date    Asthma, chronic     Combined hyperlipidemia associated with type 2 diabetes mellitus     DM (diabetes mellitus)     Gastroparesis     HTN (hypertension), benign     Hypercholesteremia     Unspecified disorder of kidney and ureter     kidney stones     Past Surgical History:   Procedure Laterality Date    ANGIOGRAM, CORONARY ARTERY N/A 1/10/2019    Performed by Sh[facility]aine Grigsby MD at [facility] CATH    APPENDECTOMY      CARDIAC CATHETERIZATION  2006    CARPAL TUNNEL RELEASE Left 2013    Left CTR    CARPAL TUNNEL RELEASE Left 2007    Left CTR    CARPAL TUNNEL RELEASE Right 2007    Right CTR    carpel tunnel       SECTION      COLONOSCOPY      NICHO    COLONOSCOPY W/ BIOPSIES  2011    precancer, repeat  Nicho    ELBOW SURGERY Left 2013    Left Ulnar Nerve Transposition    Fractional Flow Chamberlain (FFR), Coronary  1/10/2019    Performed by [facility] at [facility] CATH    JOINT REPLACEMENT Right 2015    Right TKA    JOINT REPLACEMENT Left 2008    Left TKA    Left heart cath Left 1/10/2019    Per[facility] CATH    lip surgery      skin cancer    PARATHYROID GLAND SURGERY      SHOULDER ARTHROSCOPY Right 06/15/2004    Right Shoulder Scope    TONSILLECTOMY      TOTAL ABDOMINAL HYSTERECTOMY  age 49    ovaries remain done for benign reasons    TOTAL KNEE ARTHROPLASTY      tvt       History reviewed. No pertinent family history.  Social History     Socioeconomic History    Marital status:      Spouse name: None    Number of children: None    Years of education: None    Highest education level: None   Social Needs    Financial resource strain: None     Food insecurity - worry: None    Food insecurity - inability: None    Transportation needs - medical: None    Transportation needs - non-medical: None   Occupational History    None   Tobacco Use    Smoking status: Never Smoker    Smokeless tobacco: Never Used   Substance and Sexual Activity    Alcohol use: No    Drug use: No    Sexual activity: Yes     Partners: Male     Birth control/protection: Post-menopausal, Surgical   Other Topics Concern    None   Social History Narrative    None       Review of patient's allergies indicates:  No Known Allergies    Current Outpatient Medications:     ALBUTEROL INHL, Inhale 2 puffs into the lungs daily as needed., Disp: , Rfl:     ascorbic acid, vitamin C, (VITAMIN C) 500 MG tablet, Take 500 mg by mouth once daily., Disp: , Rfl:     aspirin 81 MG Chew, Take 81 mg by mouth once daily., Disp: , Rfl:     biotin 1 mg Cap, Take 1 mg by mouth once daily., Disp: , Rfl:     cholestyramine (QUESTRAN) 4 gram packet, mix ONE packet in 8oz of liquid ONCE DAILY IN THE MORNING, Disp: , Rfl:     diphenoxylate-atropine 2.5-0.025 mg (LOMOTIL) 2.5-0.025 mg per tablet, Take 2 tablets by mouth as needed. , Disp: , Rfl: 0    fluticasone (FLONASE ALLERGY RELIEF) 50 mcg/actuation nasal spray, 2 sprays by Nasal route once daily. , Disp: , Rfl:     fluticasone-vilanterol (BREO ELLIPTA) 100-25 mcg/dose diskus inhaler, Inhale 1 puff into the lungs once daily. , Disp: , Rfl:     glimepiride (AMARYL) 4 MG tablet, Take 1 tablet by mouth 2 (two) times daily. , Disp: , Rfl: 0    GUAIFENESIN AC  mg/5 mL liquid, Take 5 mLs by mouth every 8 (eight) hours as needed. , Disp: , Rfl: 0    hyoscyamine (LEVSIN/SL) 0.125 mg Subl, Place 1 tablet under the tongue every 6 (six) hours as needed. , Disp: , Rfl: 0    insulin aspart U-100 (NOVOLOG FLEXPEN U-100 INSULIN) 100 unit/mL InPn pen, Inject into the skin. Per sliding scale, Disp: , Rfl:     lovastatin (MEVACOR) 40 MG tablet, Take 40 mg  by mouth nightly. , Disp: , Rfl: 0    metoclopramide HCl (REGLAN) 5 MG tablet, Take 5 mg by mouth every evening. , Disp: , Rfl: 0    nitroGLYCERIN (NITROSTAT) 0.4 MG SL tablet, Place 1 tablet (0.4 mg total) under the tongue every 5 (five) minutes as needed., Disp: 25 tablet, Rfl: 0    omeprazole (PRILOSEC) 20 MG capsule, Take 20 mg by mouth once daily.  , Disp: , Rfl:     PROAIR HFA 90 mcg/actuation inhaler, Inhale 1 puff into the lungs every 6 (six) hours as needed. , Disp: , Rfl: 0    triamterene-hydrochlorothiazide 37.5-25 mg (DYAZIDE) 37.5-25 mg per capsule, TAKE ONE CAPSULE BY MOUTH ONCE DAILY, Disp: 30 capsule, Rfl: 1    verapamil (CALAN-SR) 240 MG CR tablet, TAKE ONE TABLET BY MOUTH ONCE DAILY, Disp: 30 tablet, Rfl: 1    VICTOZA 3-ADRIA 0.6 mg/0.1 mL (18 mg/3 mL) PnIj, 1.8 mLs every evening. , Disp: , Rfl: 0    vitamin D 1000 units Tab, Take 185 mg by mouth once daily., Disp: , Rfl:     losartan (COZAAR) 50 MG tablet, Take 1 tablet (50 mg total) by mouth every evening., Disp: 90 tablet, Rfl: 1    Review of Systems   Constitution: Negative for chills, diaphoresis, weakness, malaise/fatigue and night sweats.   HENT: Negative for congestion, hearing loss and nosebleeds.    Eyes: Negative for blurred vision and visual disturbance.   Cardiovascular: Negative for chest pain, claudication, cyanosis, dyspnea on exertion (MILD), irregular heartbeat, leg swelling, near-syncope, orthopnea, palpitations, paroxysmal nocturnal dyspnea and syncope.   Respiratory: Negative for cough (CHRONIC, ASTHMA), hemoptysis, shortness of breath and wheezing.    Endocrine: Negative for cold intolerance, heat intolerance and polyuria.   Hematologic/Lymphatic: Negative for adenopathy. Does not bruise/bleed easily.   Skin: Negative for color change, itching and nail changes.   Musculoskeletal: Negative for back pain, falls, joint pain and stiffness (SOME).   Gastrointestinal: Negative for abdominal pain, change in bowel habit,  "dysphagia, heartburn, hematemesis, jaundice, melena and vomiting.        GASTROPARESIS, EGD   Genitourinary: Negative for dysuria, flank pain and frequency.   Neurological: Negative for brief paralysis, dizziness, focal weakness, light-headedness, loss of balance and tremors.   Psychiatric/Behavioral: Negative for altered mental status, depression and memory loss. The patient is not nervous/anxious.    Allergic/Immunologic: Negative for environmental allergies and persistent infections.        Objective:      Vitals:    02/22/19 0852   BP: 137/70   Pulse: 75   Weight: 101.1 kg (222 lb 14.2 oz)   Height: 5' 3" (1.6 m)   PainSc: 0-No pain     Body mass index is 39.48 kg/m².    Physical Exam   Constitutional: She is oriented to person, place, and time. She appears well-developed and well-nourished. She is active.   OBESE   HENT:   Head: Normocephalic and atraumatic.   Mouth/Throat: Oropharynx is clear and moist and mucous membranes are normal.   Eyes: Conjunctivae and EOM are normal. Pupils are equal, round, and reactive to light.   Neck: Trachea normal and normal range of motion. Neck supple. Normal carotid pulses, no hepatojugular reflux and no JVD present. Carotid bruit is not present. No edema and no erythema present. No thyromegaly present.   Cardiovascular: Normal rate and regular rhythm.  No extrasystoles are present. PMI is not displaced. Exam reveals no gallop and no friction rub.   Murmur heard.   Medium-pitched blowing holosystolic murmur is present with a grade of 1/6 at the apex.  Pulses:       Carotid pulses are 2+ on the right side, and 2+ on the left side.       Radial pulses are 2+ on the right side, and 2+ on the left side.        Femoral pulses are 2+ on the right side, and 2+ on the left side.       Dorsalis pedis pulses are 2+ on the right side, and 2+ on the left side.        Posterior tibial pulses are 2+ on the right side, and 2+ on the left side.   Pulmonary/Chest: Effort normal and breath " sounds normal. No tachypnea and no bradypnea. She has no wheezes. She has no rales. She exhibits no tenderness.   Abdominal: Soft. Bowel sounds are normal. She exhibits no distension and no mass. There is no hepatosplenomegaly. There is no guarding and no CVA tenderness.   Musculoskeletal: Normal range of motion. She exhibits no edema or tenderness.   VARICOSE VEINS,    Lymphadenopathy:     She has no cervical adenopathy.   Neurological: She is alert and oriented to person, place, and time. She has normal strength. She displays no tremor. No cranial nerve deficit.   Skin: Skin is warm and dry. No cyanosis or erythema. No pallor.   Psychiatric: She has a normal mood and affect. Her speech is normal and behavior is normal.               ..    Chemistry        Component Value Date/Time     01/10/2019 0720     06/16/2015 0950    K 4.3 01/10/2019 0720    K 4.3 06/16/2015 0950     01/10/2019 0720     06/16/2015 0950    CO2 24 01/10/2019 0720    BUN 23 (H) 01/10/2019 0720    CREATININE 0.95 01/10/2019 0720    CREATININE 1.04 06/16/2015 0950     (H) 01/10/2019 0720        Component Value Date/Time    CALCIUM 9.5 01/10/2019 0720    ALKPHOS 84 12/10/2018 1057    AST 24 12/10/2018 1057    ALT 19 12/10/2018 1057    BILITOT 0.5 12/10/2018 1057    ESTGFRAFRICA >60 01/10/2019 0720    EGFRNONAA >60 01/10/2019 0720            ..  Lab Results   Component Value Date    CHOL 143 12/10/2018     Lab Results   Component Value Date    HDL 60 12/10/2018     Lab Results   Component Value Date    LDLCALC 56.8 (L) 12/10/2018     Lab Results   Component Value Date    TRIG 131 12/10/2018     Lab Results   Component Value Date    CHOLHDL 42.0 12/10/2018     ..  Lab Results   Component Value Date    WBC 5.75 01/10/2019    HGB 12.9 01/10/2019    HCT 39.2 01/10/2019    MCV 88 01/10/2019     01/10/2019       Test(s) Reviewed  I have reviewed the following in detail:  [] Stress test   [x] Angiography   []  Echocardiogram   [x] Labs   [] Other:       Assessment:         ICD-10-CM ICD-9-CM   1. Coronary artery disease involving native coronary artery of native heart with other form of angina pectoris I25.118 414.01     413.9   2. HTN (hypertension), benign I10 401.1   3. Non-rheumatic mitral regurgitation I34.0 424.0   4. Severe obesity (BMI 35.0-39.9) with comorbidity E66.01 278.01   5. Hypercholesterolemia E78.00 272.0     Problem List Items Addressed This Visit        Cardiac/Vascular    Hypercholesterolemia    Relevant Orders    Comprehensive metabolic panel    Lipid panel    HTN (hypertension), benign    Relevant Orders    Comprehensive metabolic panel    Non-rheumatic mitral regurgitation    Relevant Orders    Comprehensive metabolic panel    Coronary artery disease involving native coronary artery of native heart - Primary    Relevant Orders    Comprehensive metabolic panel       Endocrine    Severe obesity (BMI 35.0-39.9) with comorbidity           Plan:     INCREASE LOSARTAN TO 50 MG, WATCH B P, ALL CV CLINICALLY STABLE, NO ANGINA, NO HF, NO TIA, NO CLINICAL ARRHYTHMIA,CONTINUE CURRENT MEDS, EDUCATION, DIET, EXERCISE, WEIGHT LOSS, RTC IN 6 MO WITH LABS, EXPLAINED PLAN TO PT AND ,      Coronary artery disease involving native coronary artery of native heart with other form of angina pectoris  -     Comprehensive metabolic panel; Future; Expected date: 02/22/2019    HTN (hypertension), benign  -     Comprehensive metabolic panel; Future; Expected date: 02/22/2019    Non-rheumatic mitral regurgitation  -     Comprehensive metabolic panel; Future; Expected date: 02/22/2019    Severe obesity (BMI 35.0-39.9) with comorbidity    Hypercholesterolemia  -     Comprehensive metabolic panel; Future; Expected date: 02/22/2019  -     Lipid panel; Future; Expected date: 02/22/2019    Other orders  -     losartan (COZAAR) 50 MG tablet; Take 1 tablet (50 mg total) by mouth every evening.  Dispense: 90 tablet; Refill:  1    RTC Low level/low impact aerobic exercise 5x's/wk. Heart healthy diet and risk factor modification.    See labs and med orders.    Aerobic exercise 5x's/wk. Heart healthy diet and risk factor modification.    See labs and med orders.

## 2019-03-18 RX ORDER — TRIAMTERENE AND HYDROCHLOROTHIAZIDE 37.5; 25 MG/1; MG/1
CAPSULE ORAL
Qty: 30 CAPSULE | Refills: 1 | Status: SHIPPED | OUTPATIENT
Start: 2019-03-18 | End: 2019-05-20 | Stop reason: SDUPTHER

## 2019-03-18 NOTE — TELEPHONE ENCOUNTER
----- Message from Patria Pena sent at 3/18/2019  7:58 AM CDT -----  Type: Needs Medical Advice    Who Called:  Patient   Best Call Back Number: [phone]  Additional Information: contact to advise if patient can take over the counter Glucosamine,     Thank you

## 2019-04-05 ENCOUNTER — LAB VISIT (OUTPATIENT)
Dept: LAB | Facility: HOSPITAL | Age: 69
End: 2019-04-05
Attending: INTERNAL MEDICINE
Payer: COMMERCIAL

## 2019-04-05 DIAGNOSIS — I25.118 CORONARY ARTERY DISEASE INVOLVING NATIVE CORONARY ARTERY OF NATIVE HEART WITH OTHER FORM OF ANGINA PECTORIS: ICD-10-CM

## 2019-04-05 DIAGNOSIS — I34.0 NON-RHEUMATIC MITRAL REGURGITATION: ICD-10-CM

## 2019-04-05 DIAGNOSIS — E78.00 HYPERCHOLESTEROLEMIA: ICD-10-CM

## 2019-04-05 DIAGNOSIS — I10 HTN (HYPERTENSION), BENIGN: ICD-10-CM

## 2019-04-05 LAB
ALBUMIN SERPL BCP-MCNC: 3.7 G/DL (ref 3.5–5.2)
ALP SERPL-CCNC: 100 U/L (ref 55–135)
ALT SERPL W/O P-5'-P-CCNC: 15 U/L (ref 10–44)
ANION GAP SERPL CALC-SCNC: 7 MMOL/L (ref 8–16)
AST SERPL-CCNC: 17 U/L (ref 10–40)
BILIRUB SERPL-MCNC: 0.3 MG/DL (ref 0.1–1)
BUN SERPL-MCNC: 18 MG/DL (ref 8–23)
CALCIUM SERPL-MCNC: 9.8 MG/DL (ref 8.7–10.5)
CHLORIDE SERPL-SCNC: 104 MMOL/L (ref 95–110)
CHOLEST SERPL-MCNC: 149 MG/DL (ref 120–199)
CHOLEST/HDLC SERPL: 2.3 {RATIO} (ref 2–5)
CO2 SERPL-SCNC: 27 MMOL/L (ref 23–29)
CREAT SERPL-MCNC: 1.1 MG/DL (ref 0.5–1.4)
EST. GFR  (AFRICAN AMERICAN): 59.2 ML/MIN/1.73 M^2
EST. GFR  (NON AFRICAN AMERICAN): 51.3 ML/MIN/1.73 M^2
GLUCOSE SERPL-MCNC: 103 MG/DL (ref 70–110)
HDLC SERPL-MCNC: 65 MG/DL (ref 40–75)
HDLC SERPL: 43.6 % (ref 20–50)
LDLC SERPL CALC-MCNC: 63 MG/DL (ref 63–159)
NONHDLC SERPL-MCNC: 84 MG/DL
POTASSIUM SERPL-SCNC: 4 MMOL/L (ref 3.5–5.1)
PROT SERPL-MCNC: 6.9 G/DL (ref 6–8.4)
SODIUM SERPL-SCNC: 138 MMOL/L (ref 136–145)
TRIGL SERPL-MCNC: 105 MG/DL (ref 30–150)

## 2019-04-05 PROCEDURE — 80053 COMPREHEN METABOLIC PANEL: CPT

## 2019-04-05 PROCEDURE — 36415 COLL VENOUS BLD VENIPUNCTURE: CPT | Mod: PO

## 2019-04-05 PROCEDURE — 80061 LIPID PANEL: CPT

## 2019-05-20 RX ORDER — TRIAMTERENE AND HYDROCHLOROTHIAZIDE 37.5; 25 MG/1; MG/1
CAPSULE ORAL
Qty: 30 CAPSULE | Refills: 3 | Status: SHIPPED | OUTPATIENT
Start: 2019-05-20 | End: 2019-09-07 | Stop reason: SDUPTHER

## 2019-05-29 RX ORDER — LOSARTAN POTASSIUM 50 MG/1
TABLET ORAL
Qty: 90 TABLET | Refills: 1 | Status: SHIPPED | OUTPATIENT
Start: 2019-05-29 | End: 2019-11-07 | Stop reason: DRUGHIGH

## 2019-06-03 RX ORDER — LOVASTATIN 40 MG/1
TABLET ORAL
Qty: 90 TABLET | Refills: 0 | Status: SHIPPED | OUTPATIENT
Start: 2019-06-03 | End: 2019-08-19 | Stop reason: SDUPTHER

## 2019-06-03 NOTE — TELEPHONE ENCOUNTER
----- Message from Asuncion Britton sent at 6/3/2019  8:09 AM CDT -----  Contact: self   Type:  RX Refill Request    Who Called:  Self   Refill or New Rx: refill   RX Name and Strength: lovastatin (MEVACOR) 40 MG tablet  Preferred Pharmacy with phone number:   [facility] [place] - [address] [address]  [address] [address]  [facility] [address]  Phone: [phone] Fax: [phone]  Best Call Back Number: [phone] (home)

## 2019-07-15 RX ORDER — VERAPAMIL HYDROCHLORIDE 240 MG/1
240 TABLET, FILM COATED, EXTENDED RELEASE ORAL DAILY
Qty: 90 TABLET | Refills: 1 | Status: SHIPPED | OUTPATIENT
Start: 2019-07-15 | End: 2019-08-01 | Stop reason: SDUPTHER

## 2019-07-15 NOTE — TELEPHONE ENCOUNTER
----- Message from Jalyn Mccabe sent at 7/15/2019  8:51 AM CDT -----  Contact: Self  Type:  RX Refill Request    Who Called:  patient  Refill or New Rx:  refill  RX Name and Strength:  verapamil (CALAN-SR) 240 MG CR tablet  How is the patient currently taking it? (ex. 1XDay):  1XDay  Is this a 30 day or 90 day RX:  90  [facility] with phone number[facility] [place] [facility] [address] [address] [address] [address]  [facility] [address]  Phone: [phone] Fax: [phone]  Local or Mail Order:  local  Ordering Provider:  Dr Grigsby  [facility] Call Back Number:  [phone] (home)     Additional Information:  segundo

## 2019-08-01 DIAGNOSIS — I10 HTN (HYPERTENSION), BENIGN: Primary | ICD-10-CM

## 2019-08-01 RX ORDER — VERAPAMIL HYDROCHLORIDE 240 MG/1
240 TABLET, FILM COATED, EXTENDED RELEASE ORAL DAILY
Qty: 90 TABLET | Refills: 1 | Status: SHIPPED | OUTPATIENT
Start: 2019-08-01 | End: 2019-10-16 | Stop reason: SDUPTHER

## 2019-08-19 RX ORDER — LOVASTATIN 40 MG/1
TABLET ORAL
Qty: 90 TABLET | Refills: 0 | Status: SHIPPED | OUTPATIENT
Start: 2019-08-19 | End: 2019-11-11 | Stop reason: ALTCHOICE

## 2019-09-09 RX ORDER — TRIAMTERENE AND HYDROCHLOROTHIAZIDE 37.5; 25 MG/1; MG/1
CAPSULE ORAL
Qty: 30 CAPSULE | Refills: 3 | Status: SHIPPED | OUTPATIENT
Start: 2019-09-09 | End: 2020-01-13

## 2019-10-16 RX ORDER — VERAPAMIL HYDROCHLORIDE 240 MG/1
TABLET, FILM COATED, EXTENDED RELEASE ORAL
Qty: 90 TABLET | Refills: 1 | Status: SHIPPED | OUTPATIENT
Start: 2019-10-16 | End: 2019-10-18 | Stop reason: SDUPTHER

## 2019-10-18 RX ORDER — VERAPAMIL HYDROCHLORIDE 240 MG/1
240 TABLET, FILM COATED, EXTENDED RELEASE ORAL DAILY
Qty: 90 TABLET | Refills: 0 | Status: SHIPPED | OUTPATIENT
Start: 2019-10-18 | End: 2020-01-21 | Stop reason: SDUPTHER

## 2019-10-18 NOTE — TELEPHONE ENCOUNTER
----- Message from Fara Delacruz sent at 10/18/2019 10:37 AM CDT -----  Contact: Breanne velasquez  Type:  RX Refill Request    Who Called:  Breanne  Refill or New Rx:  refill  RX Name and Strength:  verapamil (CALAN-SR) 240 MG CR tablet  How is the patient currently taking it? (ex. 1XDay):  qd  Is this a 30 day or 90 day RX:  901  Preferred Pharmacy with phone number:      [facility] - [place], [place] - [address] [facility]  [address]  [facility] [address]  Phone: [phone] Fax: [phone]          Local or Mail Order:  local  Ordering Provider:  Calista  Best Call Back Number:  [phone]  Additional Information:  Pls call pt if any issues w/ refill

## 2019-11-07 ENCOUNTER — OFFICE VISIT (OUTPATIENT)
Dept: CARDIOLOGY | Facility: CLINIC | Age: 69
End: 2019-11-07
Payer: MEDICARE

## 2019-11-07 DIAGNOSIS — I25.118 CORONARY ARTERY DISEASE INVOLVING NATIVE CORONARY ARTERY OF NATIVE HEART WITH OTHER FORM OF ANGINA PECTORIS: Primary | ICD-10-CM

## 2019-11-07 DIAGNOSIS — R09.89 LEFT CAROTID BRUIT: ICD-10-CM

## 2019-11-07 DIAGNOSIS — I10 HTN (HYPERTENSION), BENIGN: ICD-10-CM

## 2019-11-07 DIAGNOSIS — E78.00 HYPERCHOLESTEROLEMIA: ICD-10-CM

## 2019-11-07 DIAGNOSIS — E66.01 SEVERE OBESITY (BMI 35.0-39.9) WITH COMORBIDITY: ICD-10-CM

## 2019-11-07 DIAGNOSIS — I34.0 NON-RHEUMATIC MITRAL REGURGITATION: ICD-10-CM

## 2019-11-07 PROBLEM — R01.1 UNDIAGNOSED CARDIAC MURMURS: Status: RESOLVED | Noted: 2018-11-05 | Resolved: 2019-11-07

## 2019-11-07 PROCEDURE — 99999 PR PBB SHADOW E&M-EST. PATIENT-LVL IV: CPT | Mod: PBBFAC,,, | Performed by: INTERNAL MEDICINE

## 2019-11-07 PROCEDURE — 1101F PT FALLS ASSESS-DOCD LE1/YR: CPT | Mod: CPTII,S$GLB,, | Performed by: INTERNAL MEDICINE

## 2019-11-07 PROCEDURE — 3075F SYST BP GE 130 - 139MM HG: CPT | Mod: CPTII,S$GLB,, | Performed by: INTERNAL MEDICINE

## 2019-11-07 PROCEDURE — 99999 PR PBB SHADOW E&M-EST. PATIENT-LVL IV: ICD-10-PCS | Mod: PBBFAC,,, | Performed by: INTERNAL MEDICINE

## 2019-11-07 PROCEDURE — 99214 OFFICE O/P EST MOD 30 MIN: CPT | Mod: S$GLB,,, | Performed by: INTERNAL MEDICINE

## 2019-11-07 PROCEDURE — 3078F DIAST BP <80 MM HG: CPT | Mod: CPTII,S$GLB,, | Performed by: INTERNAL MEDICINE

## 2019-11-07 PROCEDURE — 3075F PR MOST RECENT SYSTOLIC BLOOD PRESS GE 130-139MM HG: ICD-10-PCS | Mod: CPTII,S$GLB,, | Performed by: INTERNAL MEDICINE

## 2019-11-07 PROCEDURE — 1101F PR PT FALLS ASSESS DOC 0-1 FALLS W/OUT INJ PAST YR: ICD-10-PCS | Mod: CPTII,S$GLB,, | Performed by: INTERNAL MEDICINE

## 2019-11-07 PROCEDURE — 99214 PR OFFICE/OUTPT VISIT, EST, LEVL IV, 30-39 MIN: ICD-10-PCS | Mod: S$GLB,,, | Performed by: INTERNAL MEDICINE

## 2019-11-07 PROCEDURE — 3078F PR MOST RECENT DIASTOLIC BLOOD PRESSURE < 80 MM HG: ICD-10-PCS | Mod: CPTII,S$GLB,, | Performed by: INTERNAL MEDICINE

## 2019-11-07 RX ORDER — LOSARTAN POTASSIUM 50 MG/1
75 TABLET ORAL DAILY
Qty: 135 TABLET | Refills: 1 | Status: SHIPPED | OUTPATIENT
Start: 2019-11-07 | End: 2019-12-03 | Stop reason: DRUGHIGH

## 2019-11-07 NOTE — PROGRESS NOTES
Subjective:    Patient ID:  Breanne Gregory is a 69 y.o. female who presents for Coronary Artery Disease; Hyperlipidemia; and Hypertension        HPI    DISCUSSED LABS AND GOALS LAST LDL 63, HAD LABS AT [facility] YESTERDAY FOR DR TONY, DOING WELL,BP 'S, ON MACROBID FOR UTI,  NO CHEST PAIN PER SE, SHORTNESS OF BREATH WAS WEIGHT, NO TIA TYPE SYMPTOMS, NO NEAR-SYNCOPE, SEE ROS  Past Medical History:   Diagnosis Date    Asthma, chronic     Combined hyperlipidemia associated with type 2 diabetes mellitus     DM (diabetes mellitus)     Gastroparesis     HTN (hypertension), benign     Hypercholesteremia     Unspecified disorder of kidney and ureter     kidney stones     Past Surgical History:   Procedure Laterality Date    APPENDECTOMY      CARDIAC CATHETERIZATION  2006    CARPAL TUNNEL RELEASE Left 2013    Left CTR    CARPAL TUNNEL RELEASE Left 2007    Left CTR    CARPAL TUNNEL RELEASE Right 2007    Right CTR    carpel tunnel       SECTION      COLONOSCOPY      NICHO    COLONOSCOPY W/ BIOPSIES      precancer, repeat  Nicho    CORONARY ANGIOGRAPHY N/A 1/10/2019    Procedure: ANGIOGRAM, CORONARY ARTERY;  Surgeon: Sharmaine Grigsby MD;  Location: [facility] CATH;  Service: Cardiology;  Laterality: N/A;    ELBOW SURGERY Left 2013    Left Ulnar Nerve Transposition    JOINT REPLACEMENT Right 2015    Right TKA    JOINT REPLACEMENT Left 2008    Left TKA    LEFT HEART CATHETERIZATION Left 1/10/2019    Procedure: Left heart cath;  Surgeon: Sharmaine Grigsby MD;  Location: [facility]ATH;  Service: Cardiology;  Laterality: Left;    lip surgery      skin cancer    PARATHYROID GLAND SURGERY      SHOULDER ARTHROSCOPY Right 06/15/2004    Right Shoulder Scope    TONSILLECTOMY      TOTAL ABDOMINAL HYSTERECTOMY  age 49    ovaries remain done for benign reasons    TOTAL KNEE ARTHROPLASTY      tvt       No family history on file.  Social History     Socioeconomic  History    Marital status:      Spouse name: Not on file    Number of children: Not on file    Years of education: Not on file    Highest education level: Not on file   Occupational History    Not on file   Social Needs    Financial resource strain: Not on file    Food insecurity:     Worry: Not on file     Inability: Not on file    Transportation needs:     Medical: Not on file     Non-medical: Not on file   Tobacco Use    Smoking status: Never Smoker    Smokeless tobacco: Never Used   Substance and Sexual Activity    Alcohol use: No    Drug use: No    Sexual activity: Yes     Partners: Male     Birth control/protection: Post-menopausal, Surgical   Lifestyle    Physical activity:     Days per week: Not on file     Minutes per session: Not on file    Stress: Not on file   Relationships    Social connections:     Talks on phone: Not on file     Gets together: Not on file     Attends Christianity service: Not on file     Active member of club or organization: Not on file     Attends meetings of clubs or organizations: Not on file     Relationship status: Not on file   Other Topics Concern    Not on file   Social History Narrative    Not on file       Review of patient's allergies indicates:  No Known Allergies    Current Outpatient Medications:     ALBUTEROL INHL, Inhale 2 puffs into the lungs daily as needed., Disp: , Rfl:     ascorbic acid, vitamin C, (VITAMIN C) 500 MG tablet, Take 500 mg by mouth once daily., Disp: , Rfl:     aspirin 81 MG Chew, Take 81 mg by mouth once daily., Disp: , Rfl:     biotin 1 mg Cap, Take 1 mg by mouth once daily., Disp: , Rfl:     cholestyramine (QUESTRAN) 4 gram packet, mix ONE packet in 8oz of liquid ONCE DAILY IN THE MORNING, Disp: , Rfl:     diphenoxylate-atropine 2.5-0.025 mg (LOMOTIL) 2.5-0.025 mg per tablet, Take 2 tablets by mouth as needed. , Disp: , Rfl: 0    fluticasone (FLONASE ALLERGY RELIEF) 50 mcg/actuation nasal spray, 2 sprays by Nasal  route once daily. , Disp: , Rfl:     fluticasone-vilanterol (BREO ELLIPTA) 100-25 mcg/dose diskus inhaler, Inhale 1 puff into the lungs once daily. , Disp: , Rfl:     glimepiride (AMARYL) 4 MG tablet, Take 1 tablet by mouth 2 (two) times daily. , Disp: , Rfl: 0    GUAIFENESIN AC  mg/5 mL liquid, Take 5 mLs by mouth every 8 (eight) hours as needed. , Disp: , Rfl: 0    hyoscyamine (LEVSIN/SL) 0.125 mg Subl, Place 1 tablet under the tongue every 6 (six) hours as needed. , Disp: , Rfl: 0    insulin aspart U-100 (NOVOLOG FLEXPEN U-100 INSULIN) 100 unit/mL InPn pen, Inject into the skin. Per sliding scale, Disp: , Rfl:     lovastatin (MEVACOR) 40 MG tablet, TAKE ONE TABLET BY MOUTH EVERY EVENING, Disp: 90 tablet, Rfl: 0    metoclopramide HCl (REGLAN) 5 MG tablet, Take 5 mg by mouth every evening. , Disp: , Rfl: 0    omeprazole (PRILOSEC) 20 MG capsule, Take 20 mg by mouth once daily.  , Disp: , Rfl:     PROAIR HFA 90 mcg/actuation inhaler, Inhale 1 puff into the lungs every 6 (six) hours as needed. , Disp: , Rfl: 0    triamterene-hydrochlorothiazide 37.5-25 mg (DYAZIDE) 37.5-25 mg per capsule, TAKE ONE CAPSULE BY MOUTH ONCE DAILY, Disp: 30 capsule, Rfl: 3    verapamil (CALAN-SR) 240 MG CR tablet, Take 1 tablet (240 mg total) by mouth once daily., Disp: 90 tablet, Rfl: 0    VICTOZA 3-ADRIA 0.6 mg/0.1 mL (18 mg/3 mL) PnIj, 1.8 mLs every evening. , Disp: , Rfl: 0    vitamin D 1000 units Tab, Take 185 mg by mouth once daily., Disp: , Rfl:     losartan (COZAAR) 50 MG tablet, Take 1.5 tablets (75 mg total) by mouth once daily., Disp: 135 tablet, Rfl: 1    nitroGLYCERIN (NITROSTAT) 0.4 MG SL tablet, Place 1 tablet (0.4 mg total) under the tongue every 5 (five) minutes as needed., Disp: 25 tablet, Rfl: 0    Review of Systems   Constitution: Negative for chills, diaphoresis, fever, malaise/fatigue and night sweats. Weight gain: SOME.   HENT: Negative for congestion, hearing loss and nosebleeds.    Eyes: Negative  "for blurred vision and visual disturbance.   Cardiovascular: Negative for chest pain, claudication, cyanosis, dyspnea on exertion (MILD), irregular heartbeat, leg swelling (RLE, HAD LASER PER DR ANKIT WEST), near-syncope, orthopnea, palpitations, paroxysmal nocturnal dyspnea and syncope.   Respiratory: Negative for cough (CHRONIC, ASTHMA), hemoptysis, shortness of breath and wheezing.    Endocrine: Negative for cold intolerance, heat intolerance and polyuria.   Hematologic/Lymphatic: Negative for adenopathy. Does not bruise/bleed easily.   Skin: Negative for color change, itching and nail changes.   Musculoskeletal: Negative for back pain, falls and joint pain.   Gastrointestinal: Negative for abdominal pain, change in bowel habit, dysphagia, heartburn, hematemesis, jaundice, melena and vomiting.        GASTROPARESIS,BETTER, CHOLESTERAMINE   Genitourinary: Negative for dysuria, flank pain and frequency.   Neurological: Negative for brief paralysis, dizziness, focal weakness, light-headedness, loss of balance, tremors and weakness.   Psychiatric/Behavioral: Negative for altered mental status, depression and memory loss. The patient is not nervous/anxious.    Allergic/Immunologic: Negative for hives and persistent infections.        Objective:      Vitals:    11/07/19 1446   BP: 139/73   Pulse: 70   Weight: 103 kg (227 lb 1.2 oz)   Height: 5' 3" (1.6 m)   PainSc: 0-No pain     Body mass index is 40.22 kg/m².    Physical Exam   Constitutional: She is oriented to person, place, and time. She appears well-developed and well-nourished. She is active.   OBESE   HENT:   Head: Normocephalic and atraumatic.   Mouth/Throat: Oropharynx is clear and moist and mucous membranes are normal.   Eyes: Pupils are equal, round, and reactive to light. Conjunctivae and EOM are normal.   Neck: Trachea normal and normal range of motion. Neck supple. Normal carotid pulses, no hepatojugular reflux and no JVD present. Carotid bruit is present. " No edema and no erythema present. No thyromegaly present.   Cardiovascular: Normal rate and regular rhythm.  No extrasystoles are present. PMI is not displaced. Exam reveals no gallop and no friction rub.   Murmur heard.   Medium-pitched blowing holosystolic murmur is present with a grade of 1/6 at the apex.  Pulses:       Carotid pulses are 2+ on the right side, and 2+ on the left side with bruit.       Radial pulses are 2+ on the right side, and 2+ on the left side.        Femoral pulses are 2+ on the right side, and 2+ on the left side.       Dorsalis pedis pulses are 2+ on the right side, and 2+ on the left side.        Posterior tibial pulses are 2+ on the right side, and 2+ on the left side.   Pulmonary/Chest: Effort normal and breath sounds normal. No tachypnea and no bradypnea. She has no wheezes. She has no rales. She exhibits no tenderness.   Abdominal: Soft. Bowel sounds are normal. She exhibits no distension and no mass. There is no hepatosplenomegaly. There is no guarding and no CVA tenderness.   Musculoskeletal: Normal range of motion. She exhibits no edema or tenderness.   VARICOSE VEINS, NO EDEMA   Lymphadenopathy:     She has no cervical adenopathy.   Neurological: She is alert and oriented to person, place, and time. She has normal strength. She displays no tremor. No cranial nerve deficit.   Skin: Skin is warm and dry. No cyanosis or erythema. No pallor.   Psychiatric: She has a normal mood and affect. Her speech is normal and behavior is normal.               ..    Chemistry        Component Value Date/Time     04/05/2019 0733     06/16/2015 0950    K 4.0 04/05/2019 0733    K 4.3 06/16/2015 0950     04/05/2019 0733     06/16/2015 0950    CO2 27 04/05/2019 0733    BUN 18 04/05/2019 0733    CREATININE 1.1 04/05/2019 0733    CREATININE 1.04 06/16/2015 0950     04/05/2019 0733        Component Value Date/Time    CALCIUM 9.8 04/05/2019 0733    ALKPHOS 100 04/05/2019 0733     AST 17 04/05/2019 0733    ALT 15 04/05/2019 0733    BILITOT 0.3 04/05/2019 0733    ESTGFRAFRICA 59.2 (A) 04/05/2019 0733    EGFRNONAA 51.3 (A) 04/05/2019 0733            ..  Lab Results   Component Value Date    CHOL 149 04/05/2019    CHOL 143 12/10/2018     Lab Results   Component Value Date    HDL 65 04/05/2019    HDL 60 12/10/2018     Lab Results   Component Value Date    LDLCALC 63.0 04/05/2019    LDLCALC 56.8 (L) 12/10/2018     Lab Results   Component Value Date    TRIG 105 04/05/2019    TRIG 131 12/10/2018     Lab Results   Component Value Date    CHOLHDL 43.6 04/05/2019    CHOLHDL 42.0 12/10/2018     ..  Lab Results   Component Value Date    WBC 5.75 01/10/2019    HGB 12.9 01/10/2019    HCT 39.2 01/10/2019    MCV 88 01/10/2019     01/10/2019       Test(s) Reviewed  I have reviewed the following in detail:  [] Stress test   [] Angiography   [] Echocardiogram   [] Labs   [x] Other:       Assessment:         ICD-10-CM ICD-9-CM   1. Coronary artery disease involving native coronary artery of native heart with other form of angina pectoris I25.118 414.01     413.9   2. HTN (hypertension), benign I10 401.1   3. Left carotid bruit R09.89 785.9   4. Non-rheumatic mitral regurgitation I34.0 424.0   5. Hypercholesterolemia E78.00 272.0   6. Severe obesity (BMI 35.0-39.9) with comorbidity E66.01 278.01     Problem List Items Addressed This Visit        Cardiac/Vascular    Hypercholesterolemia    Relevant Orders    Comprehensive metabolic panel    Lipid panel    HTN (hypertension), benign    Relevant Orders    Comprehensive metabolic panel    Non-rheumatic mitral regurgitation    Relevant Orders    Comprehensive metabolic panel    Coronary artery disease involving native coronary artery of native heart - Primary    Relevant Orders    Comprehensive metabolic panel    Left carotid bruit    Relevant Orders    CV Ultrasound Bilateral Doppler Carotid       Endocrine    Severe obesity (BMI 35.0-39.9) with comorbidity            Plan:     PT TO GET LABS FROM [facility], INCREASE LOSARTAN TO 75, TARGET BLOOD PRESSURE LOWER, WATCH BLOOD PRESSURE,ALL OTHER CV CLINICALLY STABLE, CLASS 1 ANGINA, NO HF, NO TIA, NO CLINICAL ARRHYTHMIA,CONTINUE CURRENT MEDS, EDUCATION, DIET, EXERCISE , WEIGHT LOSS RTC IN 6 MO WITH LABS, EXPLAINED PLAN TO THE PATIENT AND HER       Coronary artery disease involving native coronary artery of native heart with other form of angina pectoris  -     Comprehensive metabolic panel; Future; Expected date: 05/07/2020    HTN (hypertension), benign  -     Comprehensive metabolic panel; Future; Expected date: 05/07/2020    Left carotid bruit  -     CV Ultrasound Bilateral Doppler Carotid; Future    Non-rheumatic mitral regurgitation  -     Comprehensive metabolic panel; Future; Expected date: 05/07/2020    Hypercholesterolemia  -     Comprehensive metabolic panel; Future; Expected date: 05/07/2020  -     Lipid panel; Future; Expected date: 05/07/2020    Severe obesity (BMI 35.0-39.9) with comorbidity    Other orders  -     losartan (COZAAR) 50 MG tablet; Take 1.5 tablets (75 mg total) by mouth once daily.  Dispense: 135 tablet; Refill: 1    RTC Low level/low impact aerobic exercise 5x's/wk. Heart healthy diet and risk factor modification.    See labs and med orders.    Aerobic exercise 5x's/wk. Heart healthy diet and risk factor modification.    See labs and med orders.

## 2019-11-08 ENCOUNTER — TELEPHONE (OUTPATIENT)
Dept: CARDIOLOGY | Facility: CLINIC | Age: 69
End: 2019-11-08

## 2019-11-08 VITALS
DIASTOLIC BLOOD PRESSURE: 73 MMHG | SYSTOLIC BLOOD PRESSURE: 139 MMHG | BODY MASS INDEX: 40.23 KG/M2 | HEIGHT: 63 IN | WEIGHT: 227.06 LBS | HEART RATE: 70 BPM

## 2019-11-08 NOTE — TELEPHONE ENCOUNTER
----- Message from Modesta  sent at 11/8/2019  9:49 AM CST -----  Contact: pt  Type: Needs Medical Advice    Who Called:  pt    Best Call Back Number:     Additional Information: Requesting a call back from Nurse, regarding a faxed being received from 's office with  her test results ,please call back with alberto

## 2019-11-08 NOTE — TELEPHONE ENCOUNTER
Spoke to patient and informed that her results have been received and will be reviewed by Dr. Grigsby. Patient verbalized understanding

## 2019-11-11 RX ORDER — ROSUVASTATIN CALCIUM 20 MG/1
20 TABLET, COATED ORAL DAILY
Qty: 30 TABLET | Refills: 3 | Status: SHIPPED | OUTPATIENT
Start: 2019-11-11 | End: 2020-01-21 | Stop reason: SDUPTHER

## 2019-11-26 ENCOUNTER — CLINICAL SUPPORT (OUTPATIENT)
Dept: CARDIOLOGY | Facility: CLINIC | Age: 69
End: 2019-11-26
Attending: INTERNAL MEDICINE
Payer: MEDICARE

## 2019-11-26 DIAGNOSIS — R09.89 LEFT CAROTID BRUIT: ICD-10-CM

## 2019-11-26 LAB
LEFT ARM DIASTOLIC BLOOD PRESSURE: 73 MMHG
LEFT ARM SYSTOLIC BLOOD PRESSURE: 139 MMHG
LEFT CBA DIAS: 5 CM/S
LEFT CBA SYS: 45 CM/S
LEFT CCA DIST DIAS: 12 CM/S
LEFT CCA DIST SYS: 67 CM/S
LEFT CCA MID DIAS: 9 CM/S
LEFT CCA MID SYS: 77 CM/S
LEFT CCA PROX DIAS: 19 CM/S
LEFT CCA PROX SYS: 160 CM/S
LEFT ECA DIAS: 9 CM/S
LEFT ECA SYS: 82 CM/S
LEFT ICA DIST DIAS: 19 CM/S
LEFT ICA DIST SYS: 90 CM/S
LEFT ICA MID DIAS: 17 CM/S
LEFT ICA MID SYS: 79 CM/S
LEFT ICA PROX DIAS: 14 CM/S
LEFT ICA PROX SYS: 72 CM/S
LEFT VERTEBRAL DIAS: 10 CM/S
LEFT VERTEBRAL SYS: 52 CM/S
OHS CV CAROTID RIGHT ICA EDV HIGHEST: 14
OHS CV CAROTID ULTRASOUND LEFT ICA/CCA RATIO: 0.56
OHS CV CAROTID ULTRASOUND RIGHT ICA/CCA RATIO: 0.81
OHS CV PV CAROTID LEFT HIGHEST CCA: 160
OHS CV PV CAROTID LEFT HIGHEST ICA: 90
OHS CV PV CAROTID RIGHT HIGHEST CCA: 115
OHS CV PV CAROTID RIGHT HIGHEST ICA: 93
OHS CV US CAROTID LEFT HIGHEST EDV: 19
RIGHT ARM DIASTOLIC BLOOD PRESSURE: 73 MMHG
RIGHT ARM SYSTOLIC BLOOD PRESSURE: 139 MMHG
RIGHT CBA DIAS: 9 CM/S
RIGHT CBA SYS: 53 CM/S
RIGHT CCA DIST DIAS: 10 CM/S
RIGHT CCA DIST SYS: 74 CM/S
RIGHT CCA MID DIAS: 14 CM/S
RIGHT CCA MID SYS: 89 CM/S
RIGHT CCA PROX DIAS: 14 CM/S
RIGHT CCA PROX SYS: 115 CM/S
RIGHT ECA DIAS: 10 CM/S
RIGHT ECA SYS: 100 CM/S
RIGHT ICA DIST DIAS: 14 CM/S
RIGHT ICA DIST SYS: 93 CM/S
RIGHT ICA MID DIAS: 14 CM/S
RIGHT ICA MID SYS: 77 CM/S
RIGHT ICA PROX DIAS: 10 CM/S
RIGHT ICA PROX SYS: 76 CM/S
RIGHT VERTEBRAL DIAS: 14 CM/S
RIGHT VERTEBRAL SYS: 62 CM/S

## 2019-11-26 PROCEDURE — 93880 EXTRACRANIAL BILAT STUDY: CPT | Mod: S$GLB,,, | Performed by: INTERNAL MEDICINE

## 2019-11-26 PROCEDURE — 93880 CV US DOPPLER CAROTID (CUPID ONLY): ICD-10-PCS | Mod: S$GLB,,, | Performed by: INTERNAL MEDICINE

## 2019-12-03 RX ORDER — LOSARTAN POTASSIUM 50 MG/1
75 TABLET ORAL DAILY
Qty: 135 TABLET | Refills: 1 | Status: SHIPPED | OUTPATIENT
Start: 2019-12-03 | End: 2020-01-21 | Stop reason: SDUPTHER

## 2020-01-13 RX ORDER — TRIAMTERENE AND HYDROCHLOROTHIAZIDE 37.5; 25 MG/1; MG/1
CAPSULE ORAL
Qty: 30 CAPSULE | Refills: 3 | Status: SHIPPED | OUTPATIENT
Start: 2020-01-13 | End: 2020-01-21 | Stop reason: SDUPTHER

## 2020-01-21 RX ORDER — LOSARTAN POTASSIUM 50 MG/1
75 TABLET ORAL DAILY
Qty: 135 TABLET | Refills: 1 | Status: SHIPPED | OUTPATIENT
Start: 2020-01-21 | End: 2020-05-25

## 2020-01-21 RX ORDER — VERAPAMIL HYDROCHLORIDE 240 MG/1
240 TABLET, FILM COATED, EXTENDED RELEASE ORAL DAILY
Qty: 90 TABLET | Refills: 1 | Status: SHIPPED | OUTPATIENT
Start: 2020-01-21 | End: 2020-04-20

## 2020-01-21 RX ORDER — TRIAMTERENE AND HYDROCHLOROTHIAZIDE 37.5; 25 MG/1; MG/1
1 CAPSULE ORAL DAILY
Qty: 90 CAPSULE | Refills: 1 | Status: SHIPPED | OUTPATIENT
Start: 2020-01-21 | End: 2020-05-11

## 2020-01-21 RX ORDER — ROSUVASTATIN CALCIUM 20 MG/1
20 TABLET, COATED ORAL DAILY
Qty: 90 TABLET | Refills: 1 | Status: SHIPPED | OUTPATIENT
Start: 2020-01-21 | End: 2020-02-06

## 2020-01-21 NOTE — TELEPHONE ENCOUNTER
"----- Message from Fara Delacruz sent at 1/21/2020  1:21 PM CST -----  Contact: Breanne velasquez  Type: Needs Medical Advice    Who Called:  Breanne  Best Call Back Number: [phone]  Additional Information: Pt is calling to adv that [facility] has been faxing for refills for both her and her 's meds.  is Marty Gregory Jr. "Albert" - [phone]. Pls call if there are any questions  "

## 2020-02-06 RX ORDER — ROSUVASTATIN CALCIUM 20 MG/1
TABLET, COATED ORAL
Qty: 30 TABLET | Refills: 2 | Status: SHIPPED | OUTPATIENT
Start: 2020-02-06 | End: 2020-05-08 | Stop reason: SDUPTHER

## 2020-04-20 RX ORDER — VERAPAMIL HYDROCHLORIDE 240 MG/1
TABLET, FILM COATED, EXTENDED RELEASE ORAL
Qty: 90 TABLET | Refills: 1 | Status: SHIPPED | OUTPATIENT
Start: 2020-04-20 | End: 2020-10-16

## 2020-05-08 ENCOUNTER — TELEPHONE (OUTPATIENT)
Dept: CARDIOLOGY | Facility: CLINIC | Age: 70
End: 2020-05-08

## 2020-05-08 ENCOUNTER — OFFICE VISIT (OUTPATIENT)
Dept: CARDIOLOGY | Facility: CLINIC | Age: 70
End: 2020-05-08
Payer: MEDICARE

## 2020-05-08 VITALS
HEART RATE: 71 BPM | HEIGHT: 63 IN | DIASTOLIC BLOOD PRESSURE: 57 MMHG | WEIGHT: 227.75 LBS | BODY MASS INDEX: 40.36 KG/M2 | SYSTOLIC BLOOD PRESSURE: 123 MMHG

## 2020-05-08 DIAGNOSIS — I10 HTN (HYPERTENSION), BENIGN: ICD-10-CM

## 2020-05-08 DIAGNOSIS — E78.00 HYPERCHOLESTEROLEMIA: ICD-10-CM

## 2020-05-08 DIAGNOSIS — I34.0 NON-RHEUMATIC MITRAL REGURGITATION: ICD-10-CM

## 2020-05-08 DIAGNOSIS — E66.01 SEVERE OBESITY (BMI 35.0-39.9) WITH COMORBIDITY: ICD-10-CM

## 2020-05-08 DIAGNOSIS — I25.10 CORONARY ARTERY DISEASE INVOLVING NATIVE CORONARY ARTERY OF NATIVE HEART WITHOUT ANGINA PECTORIS: Primary | ICD-10-CM

## 2020-05-08 PROCEDURE — 3078F DIAST BP <80 MM HG: CPT | Mod: CPTII,S$GLB,, | Performed by: INTERNAL MEDICINE

## 2020-05-08 PROCEDURE — 1101F PT FALLS ASSESS-DOCD LE1/YR: CPT | Mod: CPTII,S$GLB,, | Performed by: INTERNAL MEDICINE

## 2020-05-08 PROCEDURE — 99214 PR OFFICE/OUTPT VISIT, EST, LEVL IV, 30-39 MIN: ICD-10-PCS | Mod: S$GLB,,, | Performed by: INTERNAL MEDICINE

## 2020-05-08 PROCEDURE — 3074F SYST BP LT 130 MM HG: CPT | Mod: CPTII,S$GLB,, | Performed by: INTERNAL MEDICINE

## 2020-05-08 PROCEDURE — 1159F MED LIST DOCD IN RCRD: CPT | Mod: S$GLB,,, | Performed by: INTERNAL MEDICINE

## 2020-05-08 PROCEDURE — 99999 PR PBB SHADOW E&M-EST. PATIENT-LVL IV: CPT | Mod: PBBFAC,,, | Performed by: INTERNAL MEDICINE

## 2020-05-08 PROCEDURE — 99999 PR PBB SHADOW E&M-EST. PATIENT-LVL IV: ICD-10-PCS | Mod: PBBFAC,,, | Performed by: INTERNAL MEDICINE

## 2020-05-08 PROCEDURE — 1126F PR PAIN SEVERITY QUANTIFIED, NO PAIN PRESENT: ICD-10-PCS | Mod: S$GLB,,, | Performed by: INTERNAL MEDICINE

## 2020-05-08 PROCEDURE — 1126F AMNT PAIN NOTED NONE PRSNT: CPT | Mod: S$GLB,,, | Performed by: INTERNAL MEDICINE

## 2020-05-08 PROCEDURE — 99214 OFFICE O/P EST MOD 30 MIN: CPT | Mod: S$GLB,,, | Performed by: INTERNAL MEDICINE

## 2020-05-08 PROCEDURE — 3078F PR MOST RECENT DIASTOLIC BLOOD PRESSURE < 80 MM HG: ICD-10-PCS | Mod: CPTII,S$GLB,, | Performed by: INTERNAL MEDICINE

## 2020-05-08 PROCEDURE — 1159F PR MEDICATION LIST DOCUMENTED IN MEDICAL RECORD: ICD-10-PCS | Mod: S$GLB,,, | Performed by: INTERNAL MEDICINE

## 2020-05-08 PROCEDURE — 3074F PR MOST RECENT SYSTOLIC BLOOD PRESSURE < 130 MM HG: ICD-10-PCS | Mod: CPTII,S$GLB,, | Performed by: INTERNAL MEDICINE

## 2020-05-08 PROCEDURE — 1101F PR PT FALLS ASSESS DOC 0-1 FALLS W/OUT INJ PAST YR: ICD-10-PCS | Mod: CPTII,S$GLB,, | Performed by: INTERNAL MEDICINE

## 2020-05-08 RX ORDER — ROSUVASTATIN CALCIUM 20 MG/1
20 TABLET, COATED ORAL DAILY
Qty: 90 TABLET | Refills: 1 | Status: SHIPPED | OUTPATIENT
Start: 2020-05-08 | End: 2020-06-01

## 2020-05-08 NOTE — TELEPHONE ENCOUNTER
Spoke to pt and informed that lab results were rec'd and will have Dr. Grigsby review. Pt verbalized understanding

## 2020-05-08 NOTE — PROGRESS NOTES
Subjective:    Patient ID:  Breanne Gregory is a 69 y.o. female who presents for Coronary Artery Disease        HPI    HAD LABS AT [facility], HBA1C 6.1%, CHOL 124, FURTHER DETAILS NOT AVAILABLE NOW , CAROTID ULTRASOUND BEFORE SHOWED MILD CAROTID DISEASE, DOING WELL NO CHEST PAIN NO SIGNIFICANT SHORTNESS OF BREATH NO TIA TYPE SYMPTOMS NO NEAR-SYNCOPE, SEE ROS  Past Medical History:   Diagnosis Date    Asthma, chronic     Combined hyperlipidemia associated with type 2 diabetes mellitus     DM (diabetes mellitus)     Gastroparesis     HTN (hypertension), benign     Hypercholesteremia     Unspecified disorder of kidney and ureter     kidney stones     Past Surgical History:   Procedure Laterality Date    APPENDECTOMY      CARDIAC CATHETERIZATION  2006    CARPAL TUNNEL RELEASE Left 2013    Left CTR    CARPAL TUNNEL RELEASE Left 2007    Left CTR    CARPAL TUNNEL RELEASE Right 2007    Right CTR    carpel tunnel       SECTION      COLONOSCOPY      NICHO    COLONOSCOPY W/ BIOPSIES      precancer, repeat  Nicho    CORONARY ANGIOGRAPHY N/A 1/10/2019    Procedure: ANGIOGRAM, CORONARY ARTERY;  Surgeon: Sharmaine Grigsby MD;  Location: [facility] CATH;  Service: Cardiology;  Laterality: N/A;    ELBOW SURGERY Left 2013    Left Ulnar Nerve Transposition    JOINT REPLACEMENT Right 2015    Right TKA    JOINT REPLACEMENT Left 2008    Left TKA    LEFT HEART CATHETERIZATION Left 1/10/2019    Procedure: Left heart cath;  Surgeon: Sharmaine Grigsby MD;  Location: [facility] CATH;  Service: Cardiology;  Laterality: Left;    lip surgery      skin cancer    PARATHYROID GLAND SURGERY      SHOULDER ARTHROSCOPY Right 06/15/2004    Right Shoulder Scope    TONSILLECTOMY      TOTAL ABDOMINAL HYSTERECTOMY  age 49    ovaries remain done for benign reasons    TOTAL KNEE ARTHROPLASTY      tvt       No family history on file.  Social History     Socioeconomic History    Marital status:       Spouse name: Not on file    Number of children: Not on file    Years of education: Not on file    Highest education level: Not on file   Occupational History    Not on file   Social Needs    Financial resource strain: Not on file    Food insecurity:     Worry: Not on file     Inability: Not on file    Transportation needs:     Medical: Not on file     Non-medical: Not on file   Tobacco Use    Smoking status: Never Smoker    Smokeless tobacco: Never Used   Substance and Sexual Activity    Alcohol use: No    Drug use: No    Sexual activity: Yes     Partners: Male     Birth control/protection: Post-menopausal, Surgical   Lifestyle    Physical activity:     Days per week: Not on file     Minutes per session: Not on file    Stress: Not on file   Relationships    Social connections:     Talks on phone: Not on file     Gets together: Not on file     Attends Yarsanism service: Not on file     Active member of club or organization: Not on file     Attends meetings of clubs or organizations: Not on file     Relationship status: Not on file   Other Topics Concern    Not on file   Social History Narrative    Not on file       Review of patient's allergies indicates:  No Known Allergies    Current Outpatient Medications:     ALBUTEROL INHL, Inhale 2 puffs into the lungs daily as needed., Disp: , Rfl:     ascorbic acid, vitamin C, (VITAMIN C) 500 MG tablet, Take 500 mg by mouth once daily., Disp: , Rfl:     aspirin 81 MG Chew, Take 81 mg by mouth once daily., Disp: , Rfl:     biotin 1 mg Cap, Take 1 mg by mouth once daily., Disp: , Rfl:     cholestyramine (QUESTRAN) 4 gram packet, mix ONE packet in 8oz of liquid ONCE DAILY IN THE MORNING, Disp: , Rfl:     diphenoxylate-atropine 2.5-0.025 mg (LOMOTIL) 2.5-0.025 mg per tablet, Take 2 tablets by mouth as needed. , Disp: , Rfl: 0    fluticasone (FLONASE ALLERGY RELIEF) 50 mcg/actuation nasal spray, 2 sprays by Nasal route once daily. , Disp: , Rfl:      fluticasone-vilanterol (BREO ELLIPTA) 100-25 mcg/dose diskus inhaler, Inhale 1 puff into the lungs once daily. , Disp: , Rfl:     glimepiride (AMARYL) 4 MG tablet, Take 1 tablet by mouth 2 (two) times daily. , Disp: , Rfl: 0    GUAIFENESIN AC  mg/5 mL liquid, Take 5 mLs by mouth every 8 (eight) hours as needed. , Disp: , Rfl: 0    hyoscyamine (LEVSIN/SL) 0.125 mg Subl, Place 1 tablet under the tongue every 6 (six) hours as needed. , Disp: , Rfl: 0    insulin aspart U-100 (NOVOLOG FLEXPEN U-100 INSULIN) 100 unit/mL InPn pen, Inject into the skin. Per sliding scale, Disp: , Rfl:     losartan (COZAAR) 50 MG tablet, Take 1.5 tablets (75 mg total) by mouth once daily., Disp: 135 tablet, Rfl: 1    metoclopramide HCl (REGLAN) 5 MG tablet, Take 5 mg by mouth every evening. , Disp: , Rfl: 0    omeprazole (PRILOSEC) 20 MG capsule, Take 20 mg by mouth once daily.  , Disp: , Rfl:     PROAIR HFA 90 mcg/actuation inhaler, Inhale 1 puff into the lungs every 6 (six) hours as needed. , Disp: , Rfl: 0    rosuvastatin (CRESTOR) 20 MG tablet, TAKE ONE TABLET BY MOUTH ONCE DAILY, Disp: 30 tablet, Rfl: 2    semaglutide (OZEMPIC) 0.25 mg or 0.5 mg(2 mg/1.5 mL) PnIj, Inject into the skin once a week., Disp: , Rfl:     triamterene-hydrochlorothiazide 37.5-25 mg (DYAZIDE) 37.5-25 mg per capsule, Take 1 capsule by mouth once daily., Disp: 90 capsule, Rfl: 1    verapamiL (CALAN-SR) 240 MG CR tablet, TAKE ONE TABLET BY MOUTH ONCE DAILY, Disp: 90 tablet, Rfl: 1    vitamin D 1000 units Tab, Take 185 mg by mouth once daily., Disp: , Rfl:     nitroGLYCERIN (NITROSTAT) 0.4 MG SL tablet, Place 1 tablet (0.4 mg total) under the tongue every 5 (five) minutes as needed., Disp: 25 tablet, Rfl: 0    VICTOZA 3-ADRIA 0.6 mg/0.1 mL (18 mg/3 mL) PnIj, 1.8 mLs every evening. , Disp: , Rfl: 0    Review of Systems   Constitution: Negative for chills, diaphoresis, fever, malaise/fatigue and night sweats.   HENT: Negative for congestion and  "nosebleeds.    Eyes: Negative for blurred vision and visual disturbance.   Cardiovascular: Negative for chest pain, claudication, cyanosis, dyspnea on exertion (MILD), irregular heartbeat, leg swelling (RLE, HAD LASER PER DR ANKIT WEST), near-syncope, orthopnea, palpitations, paroxysmal nocturnal dyspnea and syncope.   Respiratory: Negative for cough (CHRONIC, ASTHMA), hemoptysis, shortness of breath and wheezing.    Endocrine: Negative for cold intolerance, heat intolerance and polyuria.   Hematologic/Lymphatic: Negative for adenopathy. Does not bruise/bleed easily.   Skin: Negative for color change, itching and nail changes.   Musculoskeletal: Negative for back pain, falls and joint pain.   Gastrointestinal: Negative for abdominal pain, change in bowel habit, dysphagia, heartburn, hematemesis, jaundice, melena and vomiting.        GASTROPARESIS,BETTER, CHOLESTERAMINE   Genitourinary: Negative for dysuria, flank pain and frequency.   Neurological: Negative for brief paralysis, dizziness, focal weakness, light-headedness, loss of balance, tremors and weakness.   Psychiatric/Behavioral: Negative for altered mental status, depression and memory loss. The patient is not nervous/anxious.    Allergic/Immunologic: Negative for hives and persistent infections.        Objective:      Vitals:    05/08/20 1000   BP: (!) 123/57   Pulse: 71   Weight: 103.3 kg (227 lb 11.8 oz)   Height: 5' 3" (1.6 m)   PainSc: 0-No pain     Body mass index is 40.34 kg/m².    Physical Exam   Constitutional: She is oriented to person, place, and time. She appears well-developed and well-nourished. She is active.   OBESE   HENT:   Head: Normocephalic and atraumatic.   Mouth/Throat: Oropharynx is clear and moist and mucous membranes are normal.   Eyes: Pupils are equal, round, and reactive to light. Conjunctivae and EOM are normal.   Neck: Trachea normal and normal range of motion. Neck supple. Normal carotid pulses, no hepatojugular reflux and no " JVD present. Carotid bruit is present. No edema and no erythema present. No thyromegaly present.   Cardiovascular: Normal rate and regular rhythm.  No extrasystoles are present. PMI is not displaced. Exam reveals no gallop and no friction rub.   Murmur heard.   Medium-pitched blowing holosystolic murmur is present with a grade of 1/6 at the apex.  Pulses:       Carotid pulses are 2+ on the right side, and 2+ on the left side with bruit.       Radial pulses are 2+ on the right side, and 2+ on the left side.        Femoral pulses are 2+ on the right side, and 2+ on the left side.       Dorsalis pedis pulses are 2+ on the right side, and 2+ on the left side.        Posterior tibial pulses are 2+ on the right side, and 2+ on the left side.   Pulmonary/Chest: Effort normal and breath sounds normal. No tachypnea and no bradypnea. She has no wheezes. She has no rales. She exhibits no tenderness.   Abdominal: Soft. Bowel sounds are normal. She exhibits no distension and no mass. There is no hepatosplenomegaly. There is no guarding and no CVA tenderness.   Musculoskeletal: Normal range of motion. She exhibits no edema or tenderness.   VARICOSE VEINS, NO EDEMA   Lymphadenopathy:     She has no cervical adenopathy.   Neurological: She is alert and oriented to person, place, and time. She has normal strength. She displays no tremor. No cranial nerve deficit.   Skin: Skin is warm and dry. No cyanosis or erythema. No pallor.   Psychiatric: She has a normal mood and affect. Her speech is normal and behavior is normal.               ..    Chemistry        Component Value Date/Time     04/05/2019 0733     06/16/2015 0950    K 4.0 04/05/2019 0733    K 4.3 06/16/2015 0950     04/05/2019 0733     06/16/2015 0950    CO2 27 04/05/2019 0733    BUN 18 04/05/2019 0733    CREATININE 1.1 04/05/2019 0733    CREATININE 1.04 06/16/2015 0950     04/05/2019 0733        Component Value Date/Time    CALCIUM 9.8  04/05/2019 0733    ALKPHOS 100 04/05/2019 0733    AST 17 04/05/2019 0733    ALT 15 04/05/2019 0733    BILITOT 0.3 04/05/2019 0733    ESTGFRAFRICA 59.2 (A) 04/05/2019 0733    EGFRNONAA 51.3 (A) 04/05/2019 0733            ..  Lab Results   Component Value Date    CHOL 149 04/05/2019    CHOL 143 12/10/2018     Lab Results   Component Value Date    HDL 65 04/05/2019    HDL 60 12/10/2018     Lab Results   Component Value Date    LDLCALC 63.0 04/05/2019    LDLCALC 56.8 (L) 12/10/2018     Lab Results   Component Value Date    TRIG 105 04/05/2019    TRIG 131 12/10/2018     Lab Results   Component Value Date    CHOLHDL 43.6 04/05/2019    CHOLHDL 42.0 12/10/2018     ..  Lab Results   Component Value Date    WBC 5.75 01/10/2019    HGB 12.9 01/10/2019    HCT 39.2 01/10/2019    MCV 88 01/10/2019     01/10/2019       Test(s) Reviewed  I have reviewed the following in detail:  [] Stress test   [] Angiography   [] Echocardiogram   [x] Labs   [x] Other:       Assessment:         ICD-10-CM ICD-9-CM   1. Coronary artery disease involving native coronary artery of native heart without angina pectoris I25.10 414.01   2. HTN (hypertension), benign I10 401.1   3. Non-rheumatic mitral regurgitation I34.0 424.0   4. Severe obesity (BMI 35.0-39.9) with comorbidity E66.01 278.01   5. Hypercholesterolemia E78.00 272.0     Problem List Items Addressed This Visit        Cardiac/Vascular    Hypercholesterolemia    Relevant Orders    Comprehensive metabolic panel    Lipid Panel    HTN (hypertension), benign    Relevant Orders    Comprehensive metabolic panel    Non-rheumatic mitral regurgitation    Relevant Orders    Comprehensive metabolic panel    Coronary artery disease involving native coronary artery of native heart - Primary    Relevant Orders    Comprehensive metabolic panel       Endocrine    Severe obesity (BMI 35.0-39.9) with comorbidity    Relevant Orders    Comprehensive metabolic panel           Plan:     PT TO GET ME COPY OF  HER LAB WHICH WERE DONE BY DR. [facility],ALL CV CLINICALLY STABLE, NO ANGINA, NO HF, NO TIA, NO CLINICAL ARRHYTHMIA,CONTINUE CURRENT MEDS, EDUCATION, DIET, EXERCISE, WEIGHT LOSS, RETURN TO CLINIC IN 6 MONTHS WITH LABS, DISCUSSED PLAN WITH THE PATIENT AND HER       Coronary artery disease involving native coronary artery of native heart without angina pectoris  -     Comprehensive metabolic panel; Future; Expected date: 11/08/2020    HTN (hypertension), benign  -     Comprehensive metabolic panel; Future; Expected date: 11/08/2020    Non-rheumatic mitral regurgitation  -     Comprehensive metabolic panel; Future; Expected date: 11/08/2020    Severe obesity (BMI 35.0-39.9) with comorbidity  -     Comprehensive metabolic panel; Future; Expected date: 11/08/2020    Hypercholesterolemia  -     Comprehensive metabolic panel; Future; Expected date: 11/08/2020  -     Lipid Panel; Future; Expected date: 11/08/2020    RTC Low level/low impact aerobic exercise 5x's/wk. Heart healthy diet and risk factor modification.    See labs and med orders.    Aerobic exercise 5x's/wk. Heart healthy diet and risk factor modification.    See labs and med orders.

## 2020-05-08 NOTE — TELEPHONE ENCOUNTER
----- Message from Princess JESSICA Christiansen sent at 5/8/2020  1:30 PM CDT -----  Contact: P  Type: Needs Medical Advice  Who Called:  Patient  Best Call Back Number: [phone] (home)   Additional Information: requesting a call in regards to if the office has received the fax from Dr Kasper office. Please call to confirm

## 2020-05-11 RX ORDER — TRIAMTERENE AND HYDROCHLOROTHIAZIDE 37.5; 25 MG/1; MG/1
CAPSULE ORAL
Qty: 30 CAPSULE | Refills: 3 | Status: SHIPPED | OUTPATIENT
Start: 2020-05-11 | End: 2020-10-16

## 2020-05-25 RX ORDER — LOSARTAN POTASSIUM 50 MG/1
TABLET ORAL
Qty: 135 TABLET | Refills: 1 | Status: SHIPPED | OUTPATIENT
Start: 2020-05-25 | End: 2020-11-02

## 2020-06-01 RX ORDER — ROSUVASTATIN CALCIUM 20 MG/1
TABLET, COATED ORAL
Qty: 90 TABLET | Refills: 1 | Status: SHIPPED | OUTPATIENT
Start: 2020-06-01 | End: 2020-11-02

## 2020-11-06 ENCOUNTER — OFFICE VISIT (OUTPATIENT)
Dept: CARDIOLOGY | Facility: CLINIC | Age: 70
End: 2020-11-06
Payer: MEDICARE

## 2020-11-06 VITALS
SYSTOLIC BLOOD PRESSURE: 122 MMHG | HEIGHT: 63 IN | WEIGHT: 229.75 LBS | BODY MASS INDEX: 40.71 KG/M2 | HEART RATE: 58 BPM | DIASTOLIC BLOOD PRESSURE: 58 MMHG

## 2020-11-06 DIAGNOSIS — I34.0 NON-RHEUMATIC MITRAL REGURGITATION: ICD-10-CM

## 2020-11-06 DIAGNOSIS — E78.00 HYPERCHOLESTEROLEMIA: ICD-10-CM

## 2020-11-06 DIAGNOSIS — I10 HTN (HYPERTENSION), BENIGN: ICD-10-CM

## 2020-11-06 DIAGNOSIS — E66.01 OBESITY, CLASS III, BMI 40-49.9 (MORBID OBESITY): ICD-10-CM

## 2020-11-06 DIAGNOSIS — I25.10 CORONARY ARTERY DISEASE INVOLVING NATIVE CORONARY ARTERY OF NATIVE HEART WITHOUT ANGINA PECTORIS: Primary | ICD-10-CM

## 2020-11-06 PROBLEM — R09.89 LEFT CAROTID BRUIT: Status: RESOLVED | Noted: 2019-11-07 | Resolved: 2020-11-06

## 2020-11-06 PROBLEM — E66.813 OBESITY, CLASS III, BMI 40-49.9 (MORBID OBESITY): Status: ACTIVE | Noted: 2018-11-05

## 2020-11-06 PROCEDURE — 1126F PR PAIN SEVERITY QUANTIFIED, NO PAIN PRESENT: ICD-10-PCS | Mod: S$GLB,,, | Performed by: INTERNAL MEDICINE

## 2020-11-06 PROCEDURE — 1101F PT FALLS ASSESS-DOCD LE1/YR: CPT | Mod: CPTII,S$GLB,, | Performed by: INTERNAL MEDICINE

## 2020-11-06 PROCEDURE — 3074F PR MOST RECENT SYSTOLIC BLOOD PRESSURE < 130 MM HG: ICD-10-PCS | Mod: CPTII,S$GLB,, | Performed by: INTERNAL MEDICINE

## 2020-11-06 PROCEDURE — 3074F SYST BP LT 130 MM HG: CPT | Mod: CPTII,S$GLB,, | Performed by: INTERNAL MEDICINE

## 2020-11-06 PROCEDURE — 99214 OFFICE O/P EST MOD 30 MIN: CPT | Mod: S$GLB,,, | Performed by: INTERNAL MEDICINE

## 2020-11-06 PROCEDURE — 1126F AMNT PAIN NOTED NONE PRSNT: CPT | Mod: S$GLB,,, | Performed by: INTERNAL MEDICINE

## 2020-11-06 PROCEDURE — 99999 PR PBB SHADOW E&M-EST. PATIENT-LVL IV: ICD-10-PCS | Mod: PBBFAC,,, | Performed by: INTERNAL MEDICINE

## 2020-11-06 PROCEDURE — 1159F PR MEDICATION LIST DOCUMENTED IN MEDICAL RECORD: ICD-10-PCS | Mod: S$GLB,,, | Performed by: INTERNAL MEDICINE

## 2020-11-06 PROCEDURE — 99999 PR PBB SHADOW E&M-EST. PATIENT-LVL IV: CPT | Mod: PBBFAC,,, | Performed by: INTERNAL MEDICINE

## 2020-11-06 PROCEDURE — 1101F PR PT FALLS ASSESS DOC 0-1 FALLS W/OUT INJ PAST YR: ICD-10-PCS | Mod: CPTII,S$GLB,, | Performed by: INTERNAL MEDICINE

## 2020-11-06 PROCEDURE — 1159F MED LIST DOCD IN RCRD: CPT | Mod: S$GLB,,, | Performed by: INTERNAL MEDICINE

## 2020-11-06 PROCEDURE — 3078F PR MOST RECENT DIASTOLIC BLOOD PRESSURE < 80 MM HG: ICD-10-PCS | Mod: CPTII,S$GLB,, | Performed by: INTERNAL MEDICINE

## 2020-11-06 PROCEDURE — 3008F PR BODY MASS INDEX (BMI) DOCUMENTED: ICD-10-PCS | Mod: CPTII,S$GLB,, | Performed by: INTERNAL MEDICINE

## 2020-11-06 PROCEDURE — 3008F BODY MASS INDEX DOCD: CPT | Mod: CPTII,S$GLB,, | Performed by: INTERNAL MEDICINE

## 2020-11-06 PROCEDURE — 99214 PR OFFICE/OUTPT VISIT, EST, LEVL IV, 30-39 MIN: ICD-10-PCS | Mod: S$GLB,,, | Performed by: INTERNAL MEDICINE

## 2020-11-06 PROCEDURE — 3078F DIAST BP <80 MM HG: CPT | Mod: CPTII,S$GLB,, | Performed by: INTERNAL MEDICINE

## 2020-11-06 RX ORDER — VERAPAMIL HYDROCHLORIDE 180 MG/1
180 TABLET, FILM COATED, EXTENDED RELEASE ORAL NIGHTLY
Qty: 90 TABLET | Refills: 1 | Status: SHIPPED | OUTPATIENT
Start: 2020-11-06 | End: 2021-01-03 | Stop reason: SDUPTHER

## 2020-11-06 NOTE — PROGRESS NOTES
Subjective:    Patient ID:  Breanne Gregory is a 70 y.o. female who presents for Coronary Artery Disease (LABS), Hypertension, and Hyperlipidemia        HPI  DISCUSSED LABS AND GOALS, LDL 58, HDL 63, CMP OK, HBA1C 5.8%, , CBC OK, , NOT LOOSING WEIGHT,BP ON LOW END, NO CHEST PAIN NO SIGNIFICANT SHORTNESS OF BREATH NO TIA TYPE SYMPTOMS NO NEAR-SYNCOPE, SEE ROS    Past Medical History:   Diagnosis Date    Asthma, chronic     Combined hyperlipidemia associated with type 2 diabetes mellitus     DM (diabetes mellitus)     Gastroparesis     HTN (hypertension), benign     Hypercholesteremia     Unspecified disorder of kidney and ureter     kidney stones     Past Surgical History:   Procedure Laterality Date    APPENDECTOMY      CARDIAC CATHETERIZATION  2006    CARPAL TUNNEL RELEASE Left 2013    Left CTR    CARPAL TUNNEL RELEASE Left 2007    Left CTR    CARPAL TUNNEL RELEASE Right 2007    Right CTR    carpel tunnel       SECTION      COLONOSCOPY      NICHO    COLONOSCOPY W/ BIOPSIES      precancer, repeat  Nicho    CORONARY ANGIOGRAPHY N/A 1/10/2019    Procedure: ANGIOGRAM, CORONARY ARTERY;  Surgeon: Sharmaine Grigsby MD;  Location: [facility] CATH;  Service: Cardiology;  Laterality: N/A;    ELBOW SURGERY Left 2013    Left Ulnar Nerve Transposition    JOINT REPLACEMENT Right 2015    Right TKA    JOINT REPLACEMENT Left 2008    Left TKA    LEFT HEART CATHETERIZATION Left 1/10/2019    Procedure: Left heart cath;  Surgeon: Sharmaine Grigsby MD;  Location: [facility] CATH;  Service: Cardiology;  Laterality: Left;    lip surgery      skin cancer    PARATHYROID GLAND SURGERY      SHOULDER ARTHROSCOPY Right 06/15/2004    Right Shoulder Scope    TONSILLECTOMY      TOTAL ABDOMINAL HYSTERECTOMY  age 49    ovaries remain done for benign reasons    TOTAL KNEE ARTHROPLASTY      tvt       No family history on file.  Social History     Socioeconomic History    Marital  status:      Spouse name: Not on file    Number of children: Not on file    Years of education: Not on file    Highest education level: Not on file   Occupational History    Not on file   Social Needs    Financial resource strain: Not on file    Food insecurity     Worry: Not on file     Inability: Not on file    Transportation needs     Medical: Not on file     Non-medical: Not on file   Tobacco Use    Smoking status: Never Smoker    Smokeless tobacco: Never Used   Substance and Sexual Activity    Alcohol use: No    Drug use: No    Sexual activity: Yes     Partners: Male     Birth control/protection: Post-menopausal, Surgical   Lifestyle    Physical activity     Days per week: Not on file     Minutes per session: Not on file    Stress: Not on file   Relationships    Social connections     Talks on phone: Not on file     Gets together: Not on file     Attends Anabaptism service: Not on file     Active member of club or organization: Not on file     Attends meetings of clubs or organizations: Not on file     Relationship status: Not on file   Other Topics Concern    Not on file   Social History Narrative    Not on file       Review of patient's allergies indicates:  No Known Allergies    Current Outpatient Medications:     ALBUTEROL INHL, Inhale 2 puffs into the lungs daily as needed., Disp: , Rfl:     ascorbic acid, vitamin C, (VITAMIN C) 500 MG tablet, Take 500 mg by mouth once daily., Disp: , Rfl:     aspirin 81 MG Chew, Take 81 mg by mouth once daily., Disp: , Rfl:     biotin 1 mg Cap, Take 1 mg by mouth once daily., Disp: , Rfl:     cholestyramine (QUESTRAN) 4 gram packet, mix ONE packet in 8oz of liquid ONCE DAILY IN THE MORNING, Disp: , Rfl:     diphenoxylate-atropine 2.5-0.025 mg (LOMOTIL) 2.5-0.025 mg per tablet, Take 2 tablets by mouth as needed. , Disp: , Rfl: 0    fluticasone (FLONASE ALLERGY RELIEF) 50 mcg/actuation nasal spray, 2 sprays by Nasal route once daily. , Disp: ,  Rfl:     fluticasone-vilanterol (BREO ELLIPTA) 100-25 mcg/dose diskus inhaler, Inhale 1 puff into the lungs once daily. , Disp: , Rfl:     glimepiride (AMARYL) 4 MG tablet, Take 1 tablet by mouth 2 (two) times daily. , Disp: , Rfl: 0    GUAIFENESIN AC  mg/5 mL liquid, Take 5 mLs by mouth every 8 (eight) hours as needed. , Disp: , Rfl: 0    hyoscyamine (LEVSIN/SL) 0.125 mg Subl, Place 1 tablet under the tongue every 6 (six) hours as needed. , Disp: , Rfl: 0    insulin aspart U-100 (NOVOLOG FLEXPEN U-100 INSULIN) 100 unit/mL InPn pen, Inject into the skin. Per sliding scale, Disp: , Rfl:     losartan (COZAAR) 50 MG tablet, TAKE 1 AND 1/2 TABLETS BY  MOUTH ONCE DAILY, Disp: 135 tablet, Rfl: 0    metoclopramide HCl (REGLAN) 5 MG tablet, Take 5 mg by mouth every evening. , Disp: , Rfl: 0    omeprazole (PRILOSEC) 20 MG capsule, Take 20 mg by mouth once daily.  , Disp: , Rfl:     PROAIR HFA 90 mcg/actuation inhaler, Inhale 1 puff into the lungs every 6 (six) hours as needed. , Disp: , Rfl: 0    rosuvastatin (CRESTOR) 20 MG tablet, TAKE 1 TABLET BY MOUTH ONCE DAILY, Disp: 90 tablet, Rfl: 0    semaglutide (OZEMPIC) 0.25 mg or 0.5 mg(2 mg/1.5 mL) PnIj, Inject into the skin once a week., Disp: , Rfl:     triamterene-hydrochlorothiazide 37.5-25 mg (DYAZIDE) 37.5-25 mg per capsule, TAKE 1 CAPSULE BY MOUTH  ONCE DAILY, Disp: 90 capsule, Rfl: 0    vitamin D 1000 units Tab, Take 185 mg by mouth once daily., Disp: , Rfl:     nitroGLYCERIN (NITROSTAT) 0.4 MG SL tablet, Place 1 tablet (0.4 mg total) under the tongue every 5 (five) minutes as needed., Disp: 25 tablet, Rfl: 0    verapamiL (CALAN-SR) 180 MG CR tablet, Take 1 tablet (180 mg total) by mouth every evening., Disp: 90 tablet, Rfl: 1    VICTOZA 3-ADRIA 0.6 mg/0.1 mL (18 mg/3 mL) PnIj, 1.8 mLs every evening. , Disp: , Rfl: 0    Review of Systems   Constitution: Positive for weight gain. Negative for chills, diaphoresis, fever, malaise/fatigue and night  "sweats.   HENT: Negative for congestion and nosebleeds.    Eyes: Negative for blurred vision and visual disturbance.   Cardiovascular: Negative for chest pain, claudication, cyanosis, dyspnea on exertion (MILD), irregular heartbeat, leg swelling (RLE, SINCE LASER), near-syncope, orthopnea, palpitations, paroxysmal nocturnal dyspnea and syncope.   Respiratory: Negative for hemoptysis, shortness of breath and wheezing.    Endocrine: Negative for cold intolerance, heat intolerance and polyuria.   Hematologic/Lymphatic: Negative for adenopathy. Does not bruise/bleed easily.   Skin: Negative for itching and rash.   Musculoskeletal: Negative for back pain, falls and joint pain (TOP OF FOOT).   Gastrointestinal: Negative for abdominal pain, change in bowel habit, dysphagia, heartburn, hematemesis, jaundice, melena and nausea.   Genitourinary: Negative for dysuria, flank pain and frequency.   Neurological: Negative for brief paralysis, dizziness, focal weakness, light-headedness, loss of balance, numbness, tremors and weakness.   Psychiatric/Behavioral: Negative for altered mental status, depression and memory loss.   Allergic/Immunologic: Negative for hives and persistent infections.        Objective:      Vitals:    11/06/20 1035   BP: (!) 122/58   Pulse: (!) 58   Weight: 104.2 kg (229 lb 11.5 oz)   Height: 5' 3" (1.6 m)   PainSc: 0-No pain     Body mass index is 40.69 kg/m².    Physical Exam   Constitutional: She is oriented to person, place, and time. She appears well-developed and well-nourished. She is active.   OBESE   HENT:   Head: Normocephalic and atraumatic.   Mouth/Throat: Oropharynx is clear and moist and mucous membranes are normal.   Eyes: Pupils are equal, round, and reactive to light. Conjunctivae and EOM are normal.   Neck: Trachea normal. Neck supple. Normal carotid pulses, no hepatojugular reflux and no JVD present. No thyromegaly present.   Cardiovascular: Normal rate and regular rhythm.  No " extrasystoles are present. PMI is not displaced. Exam reveals no gallop and no friction rub.   Murmur heard.   Medium-pitched holosystolic murmur is present with a grade of 1/6 at the apex.  Pulses:       Carotid pulses are 2+ on the right side and 2+ on the left side with bruit.       Radial pulses are 2+ on the right side and 2+ on the left side.        Dorsalis pedis pulses are 2+ on the right side and 2+ on the left side.        Posterior tibial pulses are 2+ on the right side and 2+ on the left side.   Pulmonary/Chest: Effort normal and breath sounds normal. No tachypnea and no bradypnea. She has no wheezes. She has no rales. She exhibits no tenderness.   Abdominal: Soft. Bowel sounds are normal. There is no hepatosplenomegaly. There is no abdominal tenderness. There is no CVA tenderness.   Musculoskeletal: Normal range of motion.         General: No tenderness or edema.      Comments: VARICOSE VEINS, NO ANKLE EDEMA   Neurological: She is alert and oriented to person, place, and time. She has normal strength. No cranial nerve deficit.   Skin: Skin is warm and dry. No rash noted.   Psychiatric: She has a normal mood and affect. Her speech is normal and behavior is normal.               ..    Chemistry        Component Value Date/Time     04/05/2019 0733     06/16/2015 0950    K 4.0 04/05/2019 0733    K 4.3 06/16/2015 0950     04/05/2019 0733     06/16/2015 0950    CO2 27 04/05/2019 0733    BUN 18 04/05/2019 0733    CREATININE 1.1 04/05/2019 0733    CREATININE 1.04 06/16/2015 0950     04/05/2019 0733        Component Value Date/Time    CALCIUM 9.8 04/05/2019 0733    ALKPHOS 100 04/05/2019 0733    AST 17 04/05/2019 0733    ALT 15 04/05/2019 0733    BILITOT 0.3 04/05/2019 0733    ESTGFRAFRICA 59.2 (A) 04/05/2019 0733    EGFRNONAA 51.3 (A) 04/05/2019 0733            ..  Lab Results   Component Value Date    CHOL 149 04/05/2019    CHOL 143 12/10/2018     Lab Results   Component Value  Date    HDL 65 04/05/2019    HDL 60 12/10/2018     Lab Results   Component Value Date    LDLCALC 63.0 04/05/2019    LDLCALC 56.8 (L) 12/10/2018     Lab Results   Component Value Date    TRIG 105 04/05/2019    TRIG 131 12/10/2018     Lab Results   Component Value Date    CHOLHDL 43.6 04/05/2019    CHOLHDL 42.0 12/10/2018     ..  Lab Results   Component Value Date    WBC 5.75 01/10/2019    HGB 12.9 01/10/2019    HCT 39.2 01/10/2019    MCV 88 01/10/2019     01/10/2019       Test(s) Reviewed  I have reviewed the following in detail:  [] Stress test   [] Angiography   [] Echocardiogram   [x] Labs   [] Other:       Assessment:         ICD-10-CM ICD-9-CM   1. Coronary artery disease involving native coronary artery of native heart without angina pectoris  I25.10 414.01   2. HTN (hypertension), benign  I10 401.1   3. Non-rheumatic mitral regurgitation  I34.0 424.0   4. Hypercholesterolemia  E78.00 272.0   5. Obesity, Class III, BMI 40-49.9 (morbid obesity)  E66.01 278.01     Problem List Items Addressed This Visit        Cardiac/Vascular    Hypercholesterolemia    HTN (hypertension), benign    Non-rheumatic mitral regurgitation    Coronary artery disease involving native coronary artery of native heart - Primary       Endocrine    Obesity, Class III, BMI 40-49.9 (morbid obesity)    Relevant Orders    Ambulatory referral/consult to Bariatric Surgery           Plan:     DECREASE CALAN , WATCH BLOOD PRESSURE REFER TO BARIATRIC CLINIC FOR CONSIDERATION OF GASTRIC SLEEVE,ALL OTHER CV CLINICALLY STABLE, NO ANGINA, NO HF, NO TIA, NO CLINICAL ARRHYTHMIA,CONTINUE CURRENT MEDS, EDUCATION, DIET, EXERCISE, WEIGHT LOSS, RETURN TO CLINIC IN 6  MO, DISCUSSED PLAN WITH THE PATIENT AND HER       Coronary artery disease involving native coronary artery of native heart without angina pectoris    HTN (hypertension), benign    Non-rheumatic mitral regurgitation    Hypercholesterolemia    Obesity, Class III, BMI 40-49.9  (morbid obesity)  -     Ambulatory referral/consult to Bariatric Surgery; Future; Expected date: 11/13/2020    Other orders  -     verapamiL (CALAN-SR) 180 MG CR tablet; Take 1 tablet (180 mg total) by mouth every evening.  Dispense: 90 tablet; Refill: 1    RTC Low level/low impact aerobic exercise 5x's/wk. Heart healthy diet and risk factor modification.    See labs and med orders.    Aerobic exercise 5x's/wk. Heart healthy diet and risk factor modification.    See labs and med orders.

## 2020-11-09 ENCOUNTER — PATIENT MESSAGE (OUTPATIENT)
Dept: BARIATRICS | Facility: CLINIC | Age: 70
End: 2020-11-09

## 2021-05-07 ENCOUNTER — OFFICE VISIT (OUTPATIENT)
Dept: CARDIOLOGY | Facility: CLINIC | Age: 71
End: 2021-05-07
Payer: MEDICARE

## 2021-05-07 VITALS
SYSTOLIC BLOOD PRESSURE: 139 MMHG | DIASTOLIC BLOOD PRESSURE: 63 MMHG | HEART RATE: 72 BPM | WEIGHT: 229.75 LBS | HEIGHT: 63 IN | BODY MASS INDEX: 40.71 KG/M2

## 2021-05-07 DIAGNOSIS — E66.01 OBESITY, CLASS III, BMI 40-49.9 (MORBID OBESITY): Chronic | ICD-10-CM

## 2021-05-07 DIAGNOSIS — I77.9 MILD CAROTID ARTERY DISEASE: ICD-10-CM

## 2021-05-07 DIAGNOSIS — I25.10 CORONARY ARTERY DISEASE INVOLVING NATIVE CORONARY ARTERY OF NATIVE HEART WITHOUT ANGINA PECTORIS: Primary | Chronic | ICD-10-CM

## 2021-05-07 DIAGNOSIS — E08.22 DIABETES MELLITUS DUE TO UNDERLYING CONDITION WITH STAGE 3A CHRONIC KIDNEY DISEASE, WITH LONG-TERM CURRENT USE OF INSULIN: Chronic | ICD-10-CM

## 2021-05-07 DIAGNOSIS — N18.31 DIABETES MELLITUS DUE TO UNDERLYING CONDITION WITH STAGE 3A CHRONIC KIDNEY DISEASE, WITH LONG-TERM CURRENT USE OF INSULIN: Chronic | ICD-10-CM

## 2021-05-07 DIAGNOSIS — E78.00 HYPERCHOLESTEROLEMIA: Chronic | ICD-10-CM

## 2021-05-07 DIAGNOSIS — I34.0 NON-RHEUMATIC MITRAL REGURGITATION: Chronic | ICD-10-CM

## 2021-05-07 DIAGNOSIS — Z79.4 DIABETES MELLITUS DUE TO UNDERLYING CONDITION WITH STAGE 3A CHRONIC KIDNEY DISEASE, WITH LONG-TERM CURRENT USE OF INSULIN: Chronic | ICD-10-CM

## 2021-05-07 DIAGNOSIS — I10 ESSENTIAL HYPERTENSION: Chronic | ICD-10-CM

## 2021-05-07 PROCEDURE — 1101F PT FALLS ASSESS-DOCD LE1/YR: CPT | Mod: CPTII,S$GLB,, | Performed by: INTERNAL MEDICINE

## 2021-05-07 PROCEDURE — 1125F AMNT PAIN NOTED PAIN PRSNT: CPT | Mod: S$GLB,,, | Performed by: INTERNAL MEDICINE

## 2021-05-07 PROCEDURE — 99214 OFFICE O/P EST MOD 30 MIN: CPT | Mod: S$GLB,,, | Performed by: INTERNAL MEDICINE

## 2021-05-07 PROCEDURE — 99214 PR OFFICE/OUTPT VISIT, EST, LEVL IV, 30-39 MIN: ICD-10-PCS | Mod: S$GLB,,, | Performed by: INTERNAL MEDICINE

## 2021-05-07 PROCEDURE — 1159F MED LIST DOCD IN RCRD: CPT | Mod: S$GLB,,, | Performed by: INTERNAL MEDICINE

## 2021-05-07 PROCEDURE — 99999 PR PBB SHADOW E&M-EST. PATIENT-LVL V: ICD-10-PCS | Mod: PBBFAC,,, | Performed by: INTERNAL MEDICINE

## 2021-05-07 PROCEDURE — 99999 PR PBB SHADOW E&M-EST. PATIENT-LVL V: CPT | Mod: PBBFAC,,, | Performed by: INTERNAL MEDICINE

## 2021-05-07 PROCEDURE — 3008F PR BODY MASS INDEX (BMI) DOCUMENTED: ICD-10-PCS | Mod: CPTII,S$GLB,, | Performed by: INTERNAL MEDICINE

## 2021-05-07 PROCEDURE — 1101F PR PT FALLS ASSESS DOC 0-1 FALLS W/OUT INJ PAST YR: ICD-10-PCS | Mod: CPTII,S$GLB,, | Performed by: INTERNAL MEDICINE

## 2021-05-07 PROCEDURE — 3008F BODY MASS INDEX DOCD: CPT | Mod: CPTII,S$GLB,, | Performed by: INTERNAL MEDICINE

## 2021-05-07 PROCEDURE — 3288F FALL RISK ASSESSMENT DOCD: CPT | Mod: CPTII,S$GLB,, | Performed by: INTERNAL MEDICINE

## 2021-05-07 PROCEDURE — 1159F PR MEDICATION LIST DOCUMENTED IN MEDICAL RECORD: ICD-10-PCS | Mod: S$GLB,,, | Performed by: INTERNAL MEDICINE

## 2021-05-07 PROCEDURE — 1125F PR PAIN SEVERITY QUANTIFIED, PAIN PRESENT: ICD-10-PCS | Mod: S$GLB,,, | Performed by: INTERNAL MEDICINE

## 2021-05-07 PROCEDURE — 3288F PR FALLS RISK ASSESSMENT DOCUMENTED: ICD-10-PCS | Mod: CPTII,S$GLB,, | Performed by: INTERNAL MEDICINE

## 2021-05-07 RX ORDER — LOSARTAN POTASSIUM 50 MG/1
75 TABLET ORAL DAILY
Qty: 135 TABLET | Refills: 1 | Status: SHIPPED | OUTPATIENT
Start: 2021-05-07 | End: 2021-08-10

## 2021-05-07 RX ORDER — ZINC GLUCONATE 50 MG
50 TABLET ORAL DAILY
COMMUNITY
End: 2021-06-16 | Stop reason: CLARIF

## 2021-05-10 ENCOUNTER — PATIENT MESSAGE (OUTPATIENT)
Dept: RESEARCH | Facility: HOSPITAL | Age: 71
End: 2021-05-10

## 2021-05-20 ENCOUNTER — HOSPITAL ENCOUNTER (OUTPATIENT)
Dept: CARDIOLOGY | Facility: HOSPITAL | Age: 71
Discharge: HOME | End: 2021-05-20
Attending: INTERNAL MEDICINE
Payer: MEDICARE

## 2021-05-20 DIAGNOSIS — I10 ESSENTIAL HYPERTENSION: ICD-10-CM

## 2021-05-20 DIAGNOSIS — N18.31 DIABETES MELLITUS DUE TO UNDERLYING CONDITION WITH STAGE 3A CHRONIC KIDNEY DISEASE, WITH LONG-TERM CURRENT USE OF INSULIN: ICD-10-CM

## 2021-05-20 DIAGNOSIS — E78.00 HYPERCHOLESTEROLEMIA: ICD-10-CM

## 2021-05-20 DIAGNOSIS — E08.22 DIABETES MELLITUS DUE TO UNDERLYING CONDITION WITH STAGE 3A CHRONIC KIDNEY DISEASE, WITH LONG-TERM CURRENT USE OF INSULIN: ICD-10-CM

## 2021-05-20 DIAGNOSIS — Z79.4 DIABETES MELLITUS DUE TO UNDERLYING CONDITION WITH STAGE 3A CHRONIC KIDNEY DISEASE, WITH LONG-TERM CURRENT USE OF INSULIN: ICD-10-CM

## 2021-05-20 LAB
LEFT ABI: 1.1
LEFT ARM BP: 157 MMHG
LEFT DORSALIS PEDIS: 154 MMHG
LEFT POSTERIOR TIBIAL: 172 MMHG
LEFT TBI: 0.46
LEFT TOE PRESSURE: 73 MMHG
RIGHT ABI: 1.09
RIGHT ARM BP: 152 MMHG
RIGHT DORSALIS PEDIS: 154 MMHG
RIGHT POSTERIOR TIBIAL: 171 MMHG
RIGHT TBI: 0.78
RIGHT TOE PRESSURE: 123 MMHG

## 2021-05-20 PROCEDURE — 93922 UPR/L XTREMITY ART 2 LEVELS: CPT | Mod: PO

## 2021-05-20 PROCEDURE — 93922 ANKLE BRACHIAL INDICES (ABI): ICD-10-PCS | Mod: 26,,, | Performed by: INTERNAL MEDICINE

## 2021-05-20 PROCEDURE — 93922 UPR/L XTREMITY ART 2 LEVELS: CPT | Mod: 26,,, | Performed by: INTERNAL MEDICINE

## 2021-05-25 ENCOUNTER — TELEPHONE (OUTPATIENT)
Dept: CARDIOLOGY | Facility: CLINIC | Age: 71
End: 2021-05-25

## 2021-06-01 PROBLEM — M75.122 NONTRAUMATIC COMPLETE TEAR OF LEFT ROTATOR CUFF: Status: ACTIVE | Noted: 2021-06-01

## 2021-06-01 PROBLEM — M19.012 ARTHRITIS OF LEFT ACROMIOCLAVICULAR JOINT: Status: ACTIVE | Noted: 2021-06-01

## 2021-06-03 ENCOUNTER — TELEPHONE (OUTPATIENT)
Dept: CARDIOLOGY | Facility: CLINIC | Age: 71
End: 2021-06-03

## 2021-06-30 PROBLEM — M75.122 NONTRAUMATIC COMPLETE TEAR OF ROTATOR CUFF, LEFT: Status: ACTIVE | Noted: 2021-06-30

## 2021-11-11 ENCOUNTER — TELEPHONE (OUTPATIENT)
Dept: CARDIOLOGY | Facility: CLINIC | Age: 71
End: 2021-11-11
Payer: MEDICARE

## 2021-11-15 ENCOUNTER — OFFICE VISIT (OUTPATIENT)
Dept: CARDIOLOGY | Facility: CLINIC | Age: 71
End: 2021-11-15
Payer: MEDICARE

## 2021-11-15 VITALS
WEIGHT: 216.69 LBS | DIASTOLIC BLOOD PRESSURE: 72 MMHG | SYSTOLIC BLOOD PRESSURE: 130 MMHG | BODY MASS INDEX: 36.99 KG/M2 | HEIGHT: 64 IN

## 2021-11-15 DIAGNOSIS — I25.10 CORONARY ARTERY DISEASE INVOLVING NATIVE CORONARY ARTERY OF NATIVE HEART WITHOUT ANGINA PECTORIS: Primary | Chronic | ICD-10-CM

## 2021-11-15 DIAGNOSIS — E78.00 HYPERCHOLESTEROLEMIA: Chronic | ICD-10-CM

## 2021-11-15 DIAGNOSIS — I10 HTN (HYPERTENSION), BENIGN: Chronic | ICD-10-CM

## 2021-11-15 DIAGNOSIS — E66.01 SEVERE OBESITY (BMI 35.0-39.9) WITH COMORBIDITY: Chronic | ICD-10-CM

## 2021-11-15 DIAGNOSIS — I34.0 NON-RHEUMATIC MITRAL REGURGITATION: Chronic | ICD-10-CM

## 2021-11-15 DIAGNOSIS — N18.31 STAGE 3A CHRONIC KIDNEY DISEASE: Chronic | ICD-10-CM

## 2021-11-15 DIAGNOSIS — I77.9 MILD CAROTID ARTERY DISEASE: Chronic | ICD-10-CM

## 2021-11-15 PROCEDURE — 1126F PR PAIN SEVERITY QUANTIFIED, NO PAIN PRESENT: ICD-10-PCS | Mod: CPTII,S$GLB,, | Performed by: INTERNAL MEDICINE

## 2021-11-15 PROCEDURE — 99214 PR OFFICE/OUTPT VISIT, EST, LEVL IV, 30-39 MIN: ICD-10-PCS | Mod: S$GLB,,, | Performed by: INTERNAL MEDICINE

## 2021-11-15 PROCEDURE — 3044F HG A1C LEVEL LT 7.0%: CPT | Mod: CPTII,S$GLB,, | Performed by: INTERNAL MEDICINE

## 2021-11-15 PROCEDURE — 4010F PR ACE/ARB THEARPY RXD/TAKEN: ICD-10-PCS | Mod: CPTII,S$GLB,, | Performed by: INTERNAL MEDICINE

## 2021-11-15 PROCEDURE — 1126F AMNT PAIN NOTED NONE PRSNT: CPT | Mod: CPTII,S$GLB,, | Performed by: INTERNAL MEDICINE

## 2021-11-15 PROCEDURE — 3078F DIAST BP <80 MM HG: CPT | Mod: CPTII,S$GLB,, | Performed by: INTERNAL MEDICINE

## 2021-11-15 PROCEDURE — 1101F PT FALLS ASSESS-DOCD LE1/YR: CPT | Mod: CPTII,S$GLB,, | Performed by: INTERNAL MEDICINE

## 2021-11-15 PROCEDURE — 3078F PR MOST RECENT DIASTOLIC BLOOD PRESSURE < 80 MM HG: ICD-10-PCS | Mod: CPTII,S$GLB,, | Performed by: INTERNAL MEDICINE

## 2021-11-15 PROCEDURE — 3008F BODY MASS INDEX DOCD: CPT | Mod: CPTII,S$GLB,, | Performed by: INTERNAL MEDICINE

## 2021-11-15 PROCEDURE — 99999 PR PBB SHADOW E&M-EST. PATIENT-LVL V: ICD-10-PCS | Mod: PBBFAC,,, | Performed by: INTERNAL MEDICINE

## 2021-11-15 PROCEDURE — 99999 PR PBB SHADOW E&M-EST. PATIENT-LVL V: CPT | Mod: PBBFAC,,, | Performed by: INTERNAL MEDICINE

## 2021-11-15 PROCEDURE — 3008F PR BODY MASS INDEX (BMI) DOCUMENTED: ICD-10-PCS | Mod: CPTII,S$GLB,, | Performed by: INTERNAL MEDICINE

## 2021-11-15 PROCEDURE — 3075F PR MOST RECENT SYSTOLIC BLOOD PRESS GE 130-139MM HG: ICD-10-PCS | Mod: CPTII,S$GLB,, | Performed by: INTERNAL MEDICINE

## 2021-11-15 PROCEDURE — 1101F PR PT FALLS ASSESS DOC 0-1 FALLS W/OUT INJ PAST YR: ICD-10-PCS | Mod: CPTII,S$GLB,, | Performed by: INTERNAL MEDICINE

## 2021-11-15 PROCEDURE — 1159F MED LIST DOCD IN RCRD: CPT | Mod: CPTII,S$GLB,, | Performed by: INTERNAL MEDICINE

## 2021-11-15 PROCEDURE — 3288F PR FALLS RISK ASSESSMENT DOCUMENTED: ICD-10-PCS | Mod: CPTII,S$GLB,, | Performed by: INTERNAL MEDICINE

## 2021-11-15 PROCEDURE — 99214 OFFICE O/P EST MOD 30 MIN: CPT | Mod: S$GLB,,, | Performed by: INTERNAL MEDICINE

## 2021-11-15 PROCEDURE — 4010F ACE/ARB THERAPY RXD/TAKEN: CPT | Mod: CPTII,S$GLB,, | Performed by: INTERNAL MEDICINE

## 2021-11-15 PROCEDURE — 3288F FALL RISK ASSESSMENT DOCD: CPT | Mod: CPTII,S$GLB,, | Performed by: INTERNAL MEDICINE

## 2021-11-15 PROCEDURE — 3044F PR MOST RECENT HEMOGLOBIN A1C LEVEL <7.0%: ICD-10-PCS | Mod: CPTII,S$GLB,, | Performed by: INTERNAL MEDICINE

## 2021-11-15 PROCEDURE — 3075F SYST BP GE 130 - 139MM HG: CPT | Mod: CPTII,S$GLB,, | Performed by: INTERNAL MEDICINE

## 2021-11-15 PROCEDURE — 1159F PR MEDICATION LIST DOCUMENTED IN MEDICAL RECORD: ICD-10-PCS | Mod: CPTII,S$GLB,, | Performed by: INTERNAL MEDICINE

## 2021-11-15 RX ORDER — ROSUVASTATIN CALCIUM 20 MG/1
20 TABLET, COATED ORAL NIGHTLY
Qty: 90 TABLET | Refills: 1 | Status: SHIPPED | OUTPATIENT
Start: 2021-11-15 | End: 2022-05-20 | Stop reason: SDUPTHER

## 2021-11-15 RX ORDER — TRIAMTERENE AND HYDROCHLOROTHIAZIDE 37.5; 25 MG/1; MG/1
1 CAPSULE ORAL EVERY OTHER DAY
Qty: 45 CAPSULE | Refills: 1 | Status: SHIPPED | OUTPATIENT
Start: 2021-11-15 | End: 2022-04-21

## 2022-02-07 DIAGNOSIS — I10 HTN (HYPERTENSION), BENIGN: Primary | ICD-10-CM

## 2022-02-07 DIAGNOSIS — I10 ESSENTIAL HYPERTENSION: ICD-10-CM

## 2022-02-07 RX ORDER — LOSARTAN POTASSIUM 50 MG/1
TABLET ORAL
Qty: 135 TABLET | Refills: 1 | Status: SHIPPED | OUTPATIENT
Start: 2022-02-07 | End: 2022-06-18

## 2022-02-07 NOTE — TELEPHONE ENCOUNTER
----- Message from Frankie Painter sent at 2/7/2022 10:51 AM CST -----  Contact: pt at [phone]  Type: Needs Medical Advice  Who Called:  pt  Best Call Back Number: [phone]  Additional Information: pt is calling he office requesting to speak to the nurse. Please call back and advise.

## 2022-02-15 PROBLEM — Z96.612 STATUS POST REVERSE ARTHROPLASTY OF SHOULDER, LEFT: Status: ACTIVE | Noted: 2022-02-15

## 2022-05-16 ENCOUNTER — LAB VISIT (OUTPATIENT)
Dept: LAB | Facility: HOSPITAL | Age: 72
End: 2022-05-16
Attending: INTERNAL MEDICINE
Payer: MEDICARE

## 2022-05-16 DIAGNOSIS — N18.31 STAGE 3A CHRONIC KIDNEY DISEASE: Chronic | ICD-10-CM

## 2022-05-16 DIAGNOSIS — E78.00 HYPERCHOLESTEROLEMIA: Chronic | ICD-10-CM

## 2022-05-16 DIAGNOSIS — I25.10 CORONARY ARTERY DISEASE INVOLVING NATIVE CORONARY ARTERY OF NATIVE HEART WITHOUT ANGINA PECTORIS: Chronic | ICD-10-CM

## 2022-05-16 DIAGNOSIS — I10 HTN (HYPERTENSION), BENIGN: Chronic | ICD-10-CM

## 2022-05-16 LAB
ALBUMIN SERPL BCP-MCNC: 3.7 G/DL (ref 3.5–5.2)
ALP SERPL-CCNC: 85 U/L (ref 55–135)
ALT SERPL W/O P-5'-P-CCNC: 25 U/L (ref 10–44)
ANION GAP SERPL CALC-SCNC: 9 MMOL/L (ref 8–16)
AST SERPL-CCNC: 30 U/L (ref 10–40)
BILIRUB SERPL-MCNC: 0.7 MG/DL (ref 0.1–1)
BUN SERPL-MCNC: 17 MG/DL (ref 8–23)
CALCIUM SERPL-MCNC: 9.9 MG/DL (ref 8.7–10.5)
CHLORIDE SERPL-SCNC: 105 MMOL/L (ref 95–110)
CHOLEST SERPL-MCNC: 133 MG/DL (ref 120–199)
CHOLEST/HDLC SERPL: 2.4 {RATIO} (ref 2–5)
CO2 SERPL-SCNC: 25 MMOL/L (ref 23–29)
CREAT SERPL-MCNC: 0.8 MG/DL (ref 0.5–1.4)
EST. GFR  (AFRICAN AMERICAN): >60 ML/MIN/1.73 M^2
EST. GFR  (NON AFRICAN AMERICAN): >60 ML/MIN/1.73 M^2
GLUCOSE SERPL-MCNC: 113 MG/DL (ref 70–110)
HDLC SERPL-MCNC: 55 MG/DL (ref 40–75)
HDLC SERPL: 41.4 % (ref 20–50)
LDLC SERPL CALC-MCNC: 61.4 MG/DL (ref 63–159)
NONHDLC SERPL-MCNC: 78 MG/DL
POTASSIUM SERPL-SCNC: 4.2 MMOL/L (ref 3.5–5.1)
PROT SERPL-MCNC: 6.9 G/DL (ref 6–8.4)
SODIUM SERPL-SCNC: 139 MMOL/L (ref 136–145)
TRIGL SERPL-MCNC: 83 MG/DL (ref 30–150)

## 2022-05-16 PROCEDURE — 80061 LIPID PANEL: CPT | Performed by: INTERNAL MEDICINE

## 2022-05-16 PROCEDURE — 36415 COLL VENOUS BLD VENIPUNCTURE: CPT | Mod: PO | Performed by: INTERNAL MEDICINE

## 2022-05-16 PROCEDURE — 80053 COMPREHEN METABOLIC PANEL: CPT | Performed by: INTERNAL MEDICINE

## 2022-05-20 ENCOUNTER — OFFICE VISIT (OUTPATIENT)
Dept: CARDIOLOGY | Facility: CLINIC | Age: 72
End: 2022-05-20
Payer: MEDICARE

## 2022-05-20 VITALS
DIASTOLIC BLOOD PRESSURE: 60 MMHG | BODY MASS INDEX: 34.62 KG/M2 | SYSTOLIC BLOOD PRESSURE: 136 MMHG | WEIGHT: 202.81 LBS | HEART RATE: 71 BPM | HEIGHT: 64 IN

## 2022-05-20 DIAGNOSIS — I25.118 CORONARY ARTERY DISEASE INVOLVING NATIVE CORONARY ARTERY OF NATIVE HEART WITH OTHER FORM OF ANGINA PECTORIS: Primary | ICD-10-CM

## 2022-05-20 DIAGNOSIS — I77.9 MILD CAROTID ARTERY DISEASE: Chronic | ICD-10-CM

## 2022-05-20 DIAGNOSIS — I34.0 NON-RHEUMATIC MITRAL REGURGITATION: ICD-10-CM

## 2022-05-20 DIAGNOSIS — I10 ESSENTIAL HYPERTENSION: Chronic | ICD-10-CM

## 2022-05-20 DIAGNOSIS — E66.9 OBESITY, CLASS I, BMI 30-34.9: Chronic | ICD-10-CM

## 2022-05-20 DIAGNOSIS — E78.00 HYPERCHOLESTEROLEMIA: Chronic | ICD-10-CM

## 2022-05-20 PROBLEM — E66.811 OBESITY, CLASS I, BMI 30-34.9: Status: ACTIVE | Noted: 2018-11-05

## 2022-05-20 PROCEDURE — 3288F FALL RISK ASSESSMENT DOCD: CPT | Mod: CPTII,S$GLB,, | Performed by: INTERNAL MEDICINE

## 2022-05-20 PROCEDURE — 1101F PT FALLS ASSESS-DOCD LE1/YR: CPT | Mod: CPTII,S$GLB,, | Performed by: INTERNAL MEDICINE

## 2022-05-20 PROCEDURE — 99999 PR PBB SHADOW E&M-EST. PATIENT-LVL III: ICD-10-PCS | Mod: PBBFAC,,, | Performed by: INTERNAL MEDICINE

## 2022-05-20 PROCEDURE — 3078F PR MOST RECENT DIASTOLIC BLOOD PRESSURE < 80 MM HG: ICD-10-PCS | Mod: CPTII,S$GLB,, | Performed by: INTERNAL MEDICINE

## 2022-05-20 PROCEDURE — 1126F PR PAIN SEVERITY QUANTIFIED, NO PAIN PRESENT: ICD-10-PCS | Mod: CPTII,S$GLB,, | Performed by: INTERNAL MEDICINE

## 2022-05-20 PROCEDURE — 3044F PR MOST RECENT HEMOGLOBIN A1C LEVEL <7.0%: ICD-10-PCS | Mod: CPTII,S$GLB,, | Performed by: INTERNAL MEDICINE

## 2022-05-20 PROCEDURE — 4010F PR ACE/ARB THEARPY RXD/TAKEN: ICD-10-PCS | Mod: CPTII,S$GLB,, | Performed by: INTERNAL MEDICINE

## 2022-05-20 PROCEDURE — 3008F BODY MASS INDEX DOCD: CPT | Mod: CPTII,S$GLB,, | Performed by: INTERNAL MEDICINE

## 2022-05-20 PROCEDURE — 3075F PR MOST RECENT SYSTOLIC BLOOD PRESS GE 130-139MM HG: ICD-10-PCS | Mod: CPTII,S$GLB,, | Performed by: INTERNAL MEDICINE

## 2022-05-20 PROCEDURE — 3288F PR FALLS RISK ASSESSMENT DOCUMENTED: ICD-10-PCS | Mod: CPTII,S$GLB,, | Performed by: INTERNAL MEDICINE

## 2022-05-20 PROCEDURE — 3075F SYST BP GE 130 - 139MM HG: CPT | Mod: CPTII,S$GLB,, | Performed by: INTERNAL MEDICINE

## 2022-05-20 PROCEDURE — 1126F AMNT PAIN NOTED NONE PRSNT: CPT | Mod: CPTII,S$GLB,, | Performed by: INTERNAL MEDICINE

## 2022-05-20 PROCEDURE — 99214 OFFICE O/P EST MOD 30 MIN: CPT | Mod: S$GLB,,, | Performed by: INTERNAL MEDICINE

## 2022-05-20 PROCEDURE — 3008F PR BODY MASS INDEX (BMI) DOCUMENTED: ICD-10-PCS | Mod: CPTII,S$GLB,, | Performed by: INTERNAL MEDICINE

## 2022-05-20 PROCEDURE — 99214 PR OFFICE/OUTPT VISIT, EST, LEVL IV, 30-39 MIN: ICD-10-PCS | Mod: S$GLB,,, | Performed by: INTERNAL MEDICINE

## 2022-05-20 PROCEDURE — 3078F DIAST BP <80 MM HG: CPT | Mod: CPTII,S$GLB,, | Performed by: INTERNAL MEDICINE

## 2022-05-20 PROCEDURE — 4010F ACE/ARB THERAPY RXD/TAKEN: CPT | Mod: CPTII,S$GLB,, | Performed by: INTERNAL MEDICINE

## 2022-05-20 PROCEDURE — 3044F HG A1C LEVEL LT 7.0%: CPT | Mod: CPTII,S$GLB,, | Performed by: INTERNAL MEDICINE

## 2022-05-20 PROCEDURE — 1101F PR PT FALLS ASSESS DOC 0-1 FALLS W/OUT INJ PAST YR: ICD-10-PCS | Mod: CPTII,S$GLB,, | Performed by: INTERNAL MEDICINE

## 2022-05-20 PROCEDURE — 99999 PR PBB SHADOW E&M-EST. PATIENT-LVL III: CPT | Mod: PBBFAC,,, | Performed by: INTERNAL MEDICINE

## 2022-05-20 RX ORDER — ROSUVASTATIN CALCIUM 20 MG/1
20 TABLET, COATED ORAL NIGHTLY
Qty: 90 TABLET | Refills: 1 | Status: SHIPPED | OUTPATIENT
Start: 2022-05-20 | End: 2023-07-20

## 2022-05-20 NOTE — PROGRESS NOTES
Subjective:    Patient ID:  Breanne Gregory is a 71 y.o. female who presents for Coronary Artery Disease        HPI  DISCUSSED LABS AND GOALS CMP OK LDL 61 HDL 55, TRIGLYCERIDE 83, RECENT THYROID FUNCTION TESTS OK HBA1C 6%, HAD 2 SHOULDER SURGERIES TEAR THEN REPLACEMENT, STILL IN PT,BP OK,  SEE ROS    Past Medical History:   Diagnosis Date    Arthritis     Asthma, chronic     Combined hyperlipidemia associated with type 2 diabetes mellitus     DM (diabetes mellitus)     Gastroparesis     HTN (hypertension), benign     Hypercholesteremia     Mild carotid artery disease 2021    Unspecified disorder of kidney and ureter     kidney stones     Past Surgical History:   Procedure Laterality Date    APPENDECTOMY      ARTHROSCOPIC REPAIR OF ROTATOR CUFF OF SHOULDER Left 2021    Procedure: REPAIR, ROTATOR CUFF, ARTHROSCOPIC;  Surgeon: Priyank Thomas II, MD;  Location: [facility] OR;  Service: Orthopedics;  Laterality: Left;  possible graft agumentation    ARTHROSCOPY OF SHOULDER WITH DECOMPRESSION OF SUBACROMIAL SPACE Left 2021    Procedure: ARTHROSCOPY, SHOULDER, WITH SUBACROMIAL SPACE DECOMPRESSION;  Surgeon: Priyank Thomas II, MD;  Location: [facility] OR;  Service: Orthopedics;  Laterality: Left;    ARTHROSCOPY OF SHOULDER WITH REMOVAL OF DISTAL CLAVICLE Left 2021    Procedure: ARTHROSCOPY, SHOULDER, WITH DISTAL CLAVICLE EXCISION;  Surgeon: Priyank Thomas II, MD;  Location: [facility] OR;  Service: Orthopedics;  Laterality: Left;    CARDIAC CATHETERIZATION  2006    CARPAL TUNNEL RELEASE Left 2013    Left CTR    CARPAL TUNNEL RELEASE Left 2007    Left CTR    CARPAL TUNNEL RELEASE Right 2007    Right CTR     SECTION      COLONOSCOPY      MIGUEL    COLONOSCOPY W/ BIOPSIES  2011    precancer, repeat  Miguel    CORONARY ANGIOGRAPHY N/A 1/10/2019    Procedure: ANGIOGRAM, CORONARY ARTERY;  Surgeon: Sharmaine Grigsby MD;  Location: [facility] CATH;  Service: Cardiology;   Laterality: N/A;    ELBOW SURGERY Left 06/11/2013    Left Ulnar Nerve Transposition    FIXATION OF TENDON Left 6/30/2021    Procedure: FIXATION, TENDON - open biceps tenodesis;  Surgeon: Priyank Thomas II, MD;  Location: [facility];  Service: Orthopedics;  Laterality: Left;    JOINT REPLACEMENT Right 06/30/2015    Right TKA    JOINT REPLACEMENT Left 08/25/2008    Left TKA    LEFT HEART CATHETERIZATION Left 1/10/2019    Procedure: Left heart cath;  Surgeon: Sharmaine Grigsby MD;  Location: [facility] CATH;  Service: Cardiology;  Laterality: Left;    lip surgery      skin cancer    PARATHYROID GLAND SURGERY      REVERSE TOTAL SHOULDER ARTHROPLASTY Left 11/24/2021    Procedure: ARTHROPLASTY, SHOULDER, TOTAL, REVERSE;  Surgeon: Priyank Thomas II, MD;  Location: [facility] OR;  Service: Orthopedics;  Laterality: Left;    SHOULDER ARTHROSCOPY Right 06/15/2004    Right Shoulder Scope    TONSILLECTOMY      TOTAL ABDOMINAL HYSTERECTOMY  age 49    ovaries remain done for benign reasons    TOTAL KNEE ARTHROPLASTY  2008    tvt       Family History   Problem Relation Age of Onset    Asthma Mother     Heart disease Father     Heart attack Son      Social History     Socioeconomic History    Marital status:    Tobacco Use    Smoking status: Never Smoker    Smokeless tobacco: Never Used   Substance and Sexual Activity    Alcohol use: Yes     Comment: ocassioanlly    Drug use: No    Sexual activity: Yes     Partners: Male     Birth control/protection: Post-menopausal, Surgical       Review of patient's allergies indicates:  No Known Allergies    Current Outpatient Medications:     acetaminophen (TYLENOL) 325 MG tablet, Take 2 tablets (650 mg total) by mouth every 6 (six) hours., Disp: 56 tablet, Rfl: 0    acyclovir (ZOVIRAX) 400 MG tablet, TAKE ONE TABLET BY MOUTH THREE TIMES DAILY FOR 5-7 DAYS FEVER BLISTERS, Disp: , Rfl:     ALBUTEROL INHL, Inhale 2 puffs into the lungs daily as needed., Disp: , Rfl:      ascorbic acid, vitamin C, (VITAMIN C) 500 MG tablet, Take 500 mg by mouth once daily., Disp: , Rfl:     aspirin 81 MG Chew, Take 81 mg by mouth every evening., Disp: , Rfl:     biotin 1 mg Cap, Take 1 mg by mouth once daily., Disp: , Rfl:     colestipoL (COLESTID) 1 gram Tab, Take 1 g by mouth 2 (two) times daily., Disp: , Rfl:     cyclobenzaprine (FLEXERIL) 10 MG tablet, Take 1 tablet (10 mg total) by mouth every 12 (twelve) hours as needed for Muscle spasms., Disp: 40 tablet, Rfl: 0    docusate sodium 100 mg capsule, Take 100 mg by mouth 2 (two) times daily., Disp: 30 tablet, Rfl: 0    fluticasone furoate-vilanteroL (BREO) 100-25 mcg/dose diskus inhaler, Inhale 1 puff into the lungs nightly as needed. , Disp: , Rfl:     fluticasone propionate (FLONASE) 50 mcg/actuation nasal spray, 2 sprays by Nasal route daily as needed. , Disp: , Rfl:     glimepiride (AMARYL) 4 MG tablet, Take 1 tablet by mouth 2 (two) times daily. , Disp: , Rfl: 0    guaiFENesin-codeine 100-10 mg/5 ml (TUSSI-ORGANIDIN NR)  mg/5 mL syrup, Take by mouth., Disp: , Rfl:     hyoscyamine (LEVSIN/SL) 0.125 mg Subl, Place 1 tablet under the tongue every 6 (six) hours as needed. , Disp: , Rfl: 0    ibuprofen (ADVIL,MOTRIN) 400 MG tablet, Take 1 tablet (400 mg total) by mouth every 6 (six) hours as needed., Disp: 30 tablet, Rfl: 1    latanoprost 0.005 % ophthalmic solution, Place 1 drop into both eyes nightly., Disp: , Rfl:     losartan (COZAAR) 50 MG tablet, take ONE & ONE-HALF tablet BY MOUTH once DAILY, Disp: 135 tablet, Rfl: 1    omeprazole (PRILOSEC) 40 MG capsule, Take 40 mg by mouth every morning. , Disp: , Rfl:     oxyCODONE (ROXICODONE) 10 mg Tab immediate release tablet, Take 1 tablet (10 mg total) by mouth every 8 (eight) hours as needed for Pain., Disp: 21 tablet, Rfl: 0    oxyCODONE-acetaminophen (PERCOCET) 7.5-325 mg per tablet, Take 1 tablet by mouth every 8 (eight) hours as needed for Pain., Disp: 21 tablet, Rfl:  0    PROAIR HFA 90 mcg/actuation inhaler, Inhale 1 puff into the lungs every 6 (six) hours as needed. , Disp: , Rfl: 0    semaglutide (OZEMPIC) 0.25 mg or 0.5 mg(2 mg/1.5 mL) pen injector, Inject into the skin once a week. sundays, Disp: , Rfl:     sucralfate (CARAFATE) 1 gram tablet, Take 1 g by mouth 2 (two) times daily. , Disp: , Rfl:     triamterene-hydrochlorothiazide 37.5-25 mg (DYAZIDE) 37.5-25 mg per capsule, TAKE 1 CAPSULE BY MOUTH  EVERY OTHER DAY, Disp: 45 capsule, Rfl: 0    verapamiL (CALAN-SR) 180 MG CR tablet, TAKE 1 TABLET BY MOUTH ONCE DAILY, Disp: 90 tablet, Rfl: 1    vitamin D 1000 units Tab, Take 185 mg by mouth once daily., Disp: , Rfl:     montelukast (SINGULAIR) 10 mg tablet, every evening., Disp: , Rfl:     nitroGLYCERIN (NITROSTAT) 0.4 MG SL tablet, Place 1 tablet (0.4 mg total) under the tongue every 5 (five) minutes as needed., Disp: 25 tablet, Rfl: 0    rosuvastatin (CRESTOR) 20 MG tablet, Take 1 tablet (20 mg total) by mouth every evening., Disp: 90 tablet, Rfl: 1    Review of Systems   Constitutional: Negative for chills, diaphoresis, fever, malaise/fatigue, night sweats and weight gain.   HENT: Negative for congestion and sore throat.    Eyes: Negative for blurred vision and visual disturbance.   Cardiovascular: Negative for chest pain, claudication, cyanosis, dyspnea on exertion (MILD), irregular heartbeat, leg swelling, near-syncope, orthopnea, palpitations, paroxysmal nocturnal dyspnea and syncope.   Respiratory: Negative for cough, hemoptysis, shortness of breath and wheezing.    Hematologic/Lymphatic: Negative for adenopathy. Does not bruise/bleed easily.   Skin: Negative for color change and rash.   Musculoskeletal: Positive for joint pain (L SHOULDER). Negative for falls (TRIPPED).   Gastrointestinal: Negative for abdominal pain, change in bowel habit, jaundice, melena and nausea (OCC).   Genitourinary: Negative for dysuria and flank pain.   Neurological: Negative for  "brief paralysis, dizziness, focal weakness, light-headedness, loss of balance, numbness and weakness.   Psychiatric/Behavioral: Negative for altered mental status, depression and memory loss.   Allergic/Immunologic: Negative.         Objective:      Vitals:    05/20/22 0815 05/20/22 0833   BP: (!) 150/65 136/60   Pulse: 71    Weight: 92 kg (202 lb 13.2 oz)    Height: 5' 4" (1.626 m)    PainSc: 0-No pain      Body mass index is 34.81 kg/m².    Physical Exam  Constitutional:       Appearance: She is well-developed. She is obese.   HENT:      Head: Normocephalic and atraumatic.   Eyes:      Extraocular Movements: Extraocular movements intact.      Conjunctiva/sclera: Conjunctivae normal.      Pupils: Pupils are equal, round, and reactive to light.   Neck:      Vascular: Normal carotid pulses. No JVD.   Cardiovascular:      Rate and Rhythm: Normal rate and regular rhythm.  No extrasystoles are present.     Pulses:           Carotid pulses are 2+ on the right side and 2+ on the left side.       Radial pulses are 2+ on the right side and 2+ on the left side.        Posterior tibial pulses are 2+ on the right side and 2+ on the left side.      Heart sounds: Murmur heard.    Systolic murmur is present with a grade of 1/6 at the lower left sternal border.    No friction rub. No gallop.   Pulmonary:      Effort: Pulmonary effort is normal.      Breath sounds: Normal breath sounds. No rales.   Abdominal:      Palpations: Abdomen is soft. There is no hepatomegaly.      Tenderness: There is no abdominal tenderness.   Musculoskeletal:         General: Normal range of motion.      Cervical back: Neck supple.      Right lower leg: No edema.      Left lower leg: No edema.      Comments:  VARICOSE VEINS, SLOW GAIT   Skin:     General: Skin is warm and dry.      Capillary Refill: Capillary refill takes less than 2 seconds.   Neurological:      General: No focal deficit present.      Mental Status: She is alert and oriented to person, " place, and time.   Psychiatric:         Attention and Perception: Attention normal.         Mood and Affect: Mood normal.         Speech: Speech normal.         Behavior: Behavior normal.                 ..    Chemistry        Component Value Date/Time     05/16/2022 0823     06/16/2015 0950    K 4.2 05/16/2022 0823    K 4.3 06/16/2015 0950     05/16/2022 0823     06/16/2015 0950    CO2 25 05/16/2022 0823    BUN 17 05/16/2022 0823    CREATININE 0.8 05/16/2022 0823    CREATININE 1.04 06/16/2015 0950     (H) 05/16/2022 0823        Component Value Date/Time    CALCIUM 9.9 05/16/2022 0823    ALKPHOS 85 05/16/2022 0823    AST 30 05/16/2022 0823    ALT 25 05/16/2022 0823    BILITOT 0.7 05/16/2022 0823    ESTGFRAFRICA >60.0 05/16/2022 0823    EGFRNONAA >60.0 05/16/2022 0823            ..  Lab Results   Component Value Date    CHOL 133 05/16/2022    CHOL 149 04/05/2019    CHOL 143 12/10/2018     Lab Results   Component Value Date    HDL 55 05/16/2022    HDL 65 04/05/2019    HDL 60 12/10/2018     Lab Results   Component Value Date    LDLCALC 61.4 (L) 05/16/2022    LDLCALC 63.0 04/05/2019    LDLCALC 56.8 (L) 12/10/2018     Lab Results   Component Value Date    TRIG 83 05/16/2022    TRIG 105 04/05/2019    TRIG 131 12/10/2018     Lab Results   Component Value Date    CHOLHDL 41.4 05/16/2022    CHOLHDL 43.6 04/05/2019    CHOLHDL 42.0 12/10/2018     ..  Lab Results   Component Value Date    WBC 5.88 05/16/2022    HGB 12.4 05/16/2022    HCT 37.5 05/16/2022    MCV 85 05/16/2022     (L) 05/16/2022       Test(s) Reviewed  I have reviewed the following in detail:  [] Stress test   [] Angiography   [] Echocardiogram   [x] Labs   [] Other:       Assessment:         ICD-10-CM ICD-9-CM   1. Coronary artery disease involving native coronary artery of native heart with other form of angina pectoris  I25.118 414.01     413.9   2. Non-rheumatic mitral regurgitation  I34.0 424.0   3. Mild carotid artery  disease  I77.9 447.9   4. Hypercholesterolemia  E78.00 272.0   5. Essential hypertension  I10 401.9   6. Obesity, Class I, BMI 30-34.9  E66.9 278.00     Problem List Items Addressed This Visit        Cardiac/Vascular    Hypercholesterolemia    Relevant Orders    Comprehensive Metabolic Panel    Essential hypertension    Relevant Orders    Comprehensive Metabolic Panel    Non-rheumatic mitral regurgitation    Relevant Orders    Echo    Coronary artery disease involving native coronary artery of native heart - Primary    Relevant Orders    Comprehensive Metabolic Panel    Echo    Mild carotid artery disease       Endocrine    Obesity, Class I, BMI 30-34.9           Plan:     WATCH BP, ALL CV CLINICALLY STABLE, CLASS 1 ANGINA, NO HF, NO TIA, NO CLINICAL ARRHYTHMIA,CONTINUE CURRENT MEDS, EDUCATION, DIET, EXERCISE, WEIGHT LOSS INCREASE ACTIVITY AS MUCH AS POSSIBLE, RETURN TO CLINIC IN 6 MONTHS WITH LABS AND ECHO REASSESS EF AND MITRAL REGURGITATION, DISCUSSED PLAN WITH THE PATIENT AND HER        Coronary artery disease involving native coronary artery of native heart with other form of angina pectoris  -     Comprehensive Metabolic Panel; Future; Expected date: 05/20/2022  -     Echo    Non-rheumatic mitral regurgitation  Comments:  TABLE  Orders:  -     Echo    Mild carotid artery disease  Comments:  NO TIA    Hypercholesterolemia  Comments:  DIET AND MEDS, GOOD  Orders:  -     Comprehensive Metabolic Panel; Future; Expected date: 05/20/2022    Essential hypertension  Comments:  CONTROLLED  Orders:  -     Comprehensive Metabolic Panel; Future; Expected date: 05/20/2022    Obesity, Class I, BMI 30-34.9  Comments:  WEIGHT LOSS    Other orders  -     rosuvastatin (CRESTOR) 20 MG tablet; Take 1 tablet (20 mg total) by mouth every evening.  Dispense: 90 tablet; Refill: 1    RTC Low level/low impact aerobic exercise 5x's/wk. Heart healthy diet and risk factor modification.    See labs and med orders.    Aerobic  exercise 5x's/wk. Heart healthy diet and risk factor modification.    See labs and med orders.

## 2022-10-06 ENCOUNTER — TELEPHONE (OUTPATIENT)
Dept: VASCULAR SURGERY | Facility: CLINIC | Age: 72
End: 2022-10-06
Payer: MEDICARE

## 2022-10-06 NOTE — TELEPHONE ENCOUNTER
----- Message from Yamini Perez, Patient Care Assistant sent at 10/6/2022  9:04 AM CDT -----  Regarding: advice  Contact: pt wife  Type: Needs Medical Advice  Who Called:  pt wife   Symptoms (please be specific):  tube is draining   Best Call Back Number: [phone] (home)     Additional Information: please call pt wife to advise. Thanks!

## 2022-11-11 ENCOUNTER — CLINICAL SUPPORT (OUTPATIENT)
Dept: CARDIOLOGY | Facility: HOSPITAL | Age: 72
End: 2022-11-11
Attending: INTERNAL MEDICINE
Payer: MEDICARE

## 2022-11-11 VITALS — BODY MASS INDEX: 34.49 KG/M2 | HEIGHT: 64 IN | WEIGHT: 202 LBS

## 2022-11-11 LAB
ASCENDING AORTA: 2.7 CM
AV INDEX (PROSTH): 0.66
AV MEAN GRADIENT: 8 MMHG
AV PEAK GRADIENT: 14 MMHG
AV VALVE AREA: 2.03 CM2
AV VELOCITY RATIO: 0.63
BSA FOR ECHO PROCEDURE: 2.03 M2
CV ECHO LV RWT: 0.34 CM
DOP CALC AO PEAK VEL: 1.88 M/S
DOP CALC AO VTI: 48.4 CM
DOP CALC LVOT AREA: 3.1 CM2
DOP CALC LVOT DIAMETER: 1.98 CM
DOP CALC LVOT PEAK VEL: 1.18 M/S
DOP CALC LVOT STROKE VOLUME: 98.17 CM3
DOP CALCLVOT PEAK VEL VTI: 31.9 CM
E WAVE DECELERATION TIME: 289.31 MSEC
E/A RATIO: 0.53
E/E' RATIO: 5.24 M/S
ECHO LV POSTERIOR WALL: 0.87 CM (ref 0.6–1.1)
EJECTION FRACTION: 70 %
FRACTIONAL SHORTENING: 37 % (ref 28–44)
INTERVENTRICULAR SEPTUM: 0.87 CM (ref 0.6–1.1)
IVRT: 83.73 MSEC
LA MAJOR: 5.19 CM
LA MINOR: 5.16 CM
LA WIDTH: 4 CM
LEFT ATRIUM SIZE: 3.88 CM
LEFT ATRIUM VOLUME INDEX: 34.8 ML/M2
LEFT ATRIUM VOLUME: 68.27 CM3
LEFT INTERNAL DIMENSION IN SYSTOLE: 3.23 CM (ref 2.1–4)
LEFT VENTRICLE DIASTOLIC VOLUME INDEX: 63.19 ML/M2
LEFT VENTRICLE DIASTOLIC VOLUME: 123.86 ML
LEFT VENTRICLE MASS INDEX: 80 G/M2
LEFT VENTRICLE SYSTOLIC VOLUME INDEX: 21.4 ML/M2
LEFT VENTRICLE SYSTOLIC VOLUME: 41.97 ML
LEFT VENTRICULAR INTERNAL DIMENSION IN DIASTOLE: 5.1 CM (ref 3.5–6)
LEFT VENTRICULAR MASS: 156.49 G
LV LATERAL E/E' RATIO: 4.23 M/S
LV SEPTAL E/E' RATIO: 6.88 M/S
LVOT MG: 2.77 MMHG
LVOT MV: 0.77 CM/S
MV PEAK A VEL: 1.03 M/S
MV PEAK E VEL: 0.55 M/S
MV STENOSIS PRESSURE HALF TIME: 83.9 MS
MV VALVE AREA P 1/2 METHOD: 2.62 CM2
PISA MRMAX VEL: 5.15 M/S
PISA TR MAX VEL: 2.77 M/S
PULM VEIN S/D RATIO: 1.26
PV PEAK D VEL: 0.38 M/S
PV PEAK S VEL: 0.48 M/S
RA MAJOR: 4.6 CM
RA PRESSURE: 3 MMHG
RA WIDTH: 2.33 CM
RIGHT VENTRICULAR END-DIASTOLIC DIMENSION: 3.81 CM
RIGHT VENTRICULAR LENGTH IN DIASTOLE (APICAL 4-CHAMBER VIEW): 6.96 CM
RV MID DIAMA: 2.96 CM
RV TISSUE DOPPLER FREE WALL SYSTOLIC VELOCITY 1 (APICAL 4 CHAMBER VIEW): 0.02 CM/S
SINUS: 2.49 CM
STJ: 2.38 CM
TDI LATERAL: 0.13 M/S
TDI SEPTAL: 0.08 M/S
TDI: 0.11 M/S
TR MAX PG: 31 MMHG
TRICUSPID ANNULAR PLANE SYSTOLIC EXCURSION: 2.08 CM
TV REST PULMONARY ARTERY PRESSURE: 34 MMHG

## 2022-11-11 PROCEDURE — 93306 TTE W/DOPPLER COMPLETE: CPT | Mod: PO

## 2022-11-18 ENCOUNTER — OFFICE VISIT (OUTPATIENT)
Dept: CARDIOLOGY | Facility: CLINIC | Age: 72
End: 2022-11-18
Payer: MEDICARE

## 2022-11-18 VITALS
HEART RATE: 68 BPM | BODY MASS INDEX: 32.9 KG/M2 | DIASTOLIC BLOOD PRESSURE: 69 MMHG | WEIGHT: 192.69 LBS | HEIGHT: 64 IN | SYSTOLIC BLOOD PRESSURE: 127 MMHG

## 2022-11-18 DIAGNOSIS — E87.1 HYPONATREMIA: ICD-10-CM

## 2022-11-18 DIAGNOSIS — E78.00 HYPERCHOLESTEROLEMIA: ICD-10-CM

## 2022-11-18 DIAGNOSIS — R26.89 IMBALANCE: ICD-10-CM

## 2022-11-18 DIAGNOSIS — I77.9 MILD CAROTID ARTERY DISEASE: Chronic | ICD-10-CM

## 2022-11-18 DIAGNOSIS — I34.0 NON-RHEUMATIC MITRAL REGURGITATION: Chronic | ICD-10-CM

## 2022-11-18 DIAGNOSIS — I10 HTN (HYPERTENSION), BENIGN: Chronic | ICD-10-CM

## 2022-11-18 DIAGNOSIS — I25.10 CORONARY ARTERY DISEASE INVOLVING NATIVE CORONARY ARTERY OF NATIVE HEART WITHOUT ANGINA PECTORIS: Primary | Chronic | ICD-10-CM

## 2022-11-18 DIAGNOSIS — E66.9 OBESITY, CLASS I, BMI 30-34.9: Chronic | ICD-10-CM

## 2022-11-18 PROCEDURE — 3288F FALL RISK ASSESSMENT DOCD: CPT | Mod: CPTII,S$GLB,, | Performed by: INTERNAL MEDICINE

## 2022-11-18 PROCEDURE — 3044F HG A1C LEVEL LT 7.0%: CPT | Mod: CPTII,S$GLB,, | Performed by: INTERNAL MEDICINE

## 2022-11-18 PROCEDURE — 99999 PR PBB SHADOW E&M-EST. PATIENT-LVL III: CPT | Mod: PBBFAC,,, | Performed by: INTERNAL MEDICINE

## 2022-11-18 PROCEDURE — 3008F BODY MASS INDEX DOCD: CPT | Mod: CPTII,S$GLB,, | Performed by: INTERNAL MEDICINE

## 2022-11-18 PROCEDURE — 3044F PR MOST RECENT HEMOGLOBIN A1C LEVEL <7.0%: ICD-10-PCS | Mod: CPTII,S$GLB,, | Performed by: INTERNAL MEDICINE

## 2022-11-18 PROCEDURE — 3008F PR BODY MASS INDEX (BMI) DOCUMENTED: ICD-10-PCS | Mod: CPTII,S$GLB,, | Performed by: INTERNAL MEDICINE

## 2022-11-18 PROCEDURE — 3074F PR MOST RECENT SYSTOLIC BLOOD PRESSURE < 130 MM HG: ICD-10-PCS | Mod: CPTII,S$GLB,, | Performed by: INTERNAL MEDICINE

## 2022-11-18 PROCEDURE — 4010F PR ACE/ARB THEARPY RXD/TAKEN: ICD-10-PCS | Mod: CPTII,S$GLB,, | Performed by: INTERNAL MEDICINE

## 2022-11-18 PROCEDURE — 1159F MED LIST DOCD IN RCRD: CPT | Mod: CPTII,S$GLB,, | Performed by: INTERNAL MEDICINE

## 2022-11-18 PROCEDURE — 3074F SYST BP LT 130 MM HG: CPT | Mod: CPTII,S$GLB,, | Performed by: INTERNAL MEDICINE

## 2022-11-18 PROCEDURE — 3288F PR FALLS RISK ASSESSMENT DOCUMENTED: ICD-10-PCS | Mod: CPTII,S$GLB,, | Performed by: INTERNAL MEDICINE

## 2022-11-18 PROCEDURE — 1126F PR PAIN SEVERITY QUANTIFIED, NO PAIN PRESENT: ICD-10-PCS | Mod: CPTII,S$GLB,, | Performed by: INTERNAL MEDICINE

## 2022-11-18 PROCEDURE — 3078F DIAST BP <80 MM HG: CPT | Mod: CPTII,S$GLB,, | Performed by: INTERNAL MEDICINE

## 2022-11-18 PROCEDURE — 1159F PR MEDICATION LIST DOCUMENTED IN MEDICAL RECORD: ICD-10-PCS | Mod: CPTII,S$GLB,, | Performed by: INTERNAL MEDICINE

## 2022-11-18 PROCEDURE — 99214 OFFICE O/P EST MOD 30 MIN: CPT | Mod: S$GLB,,, | Performed by: INTERNAL MEDICINE

## 2022-11-18 PROCEDURE — 3078F PR MOST RECENT DIASTOLIC BLOOD PRESSURE < 80 MM HG: ICD-10-PCS | Mod: CPTII,S$GLB,, | Performed by: INTERNAL MEDICINE

## 2022-11-18 PROCEDURE — 1126F AMNT PAIN NOTED NONE PRSNT: CPT | Mod: CPTII,S$GLB,, | Performed by: INTERNAL MEDICINE

## 2022-11-18 PROCEDURE — 99999 PR PBB SHADOW E&M-EST. PATIENT-LVL III: ICD-10-PCS | Mod: PBBFAC,,, | Performed by: INTERNAL MEDICINE

## 2022-11-18 PROCEDURE — 1100F PTFALLS ASSESS-DOCD GE2>/YR: CPT | Mod: CPTII,S$GLB,, | Performed by: INTERNAL MEDICINE

## 2022-11-18 PROCEDURE — 99214 PR OFFICE/OUTPT VISIT, EST, LEVL IV, 30-39 MIN: ICD-10-PCS | Mod: S$GLB,,, | Performed by: INTERNAL MEDICINE

## 2022-11-18 PROCEDURE — 1100F PR PT FALLS ASSESS DOC 2+ FALLS/FALL W/INJURY/YR: ICD-10-PCS | Mod: CPTII,S$GLB,, | Performed by: INTERNAL MEDICINE

## 2022-11-18 PROCEDURE — 4010F ACE/ARB THERAPY RXD/TAKEN: CPT | Mod: CPTII,S$GLB,, | Performed by: INTERNAL MEDICINE

## 2022-11-18 RX ORDER — TRIAMTERENE AND HYDROCHLOROTHIAZIDE 37.5; 25 MG/1; MG/1
1 CAPSULE ORAL
Qty: 24 CAPSULE | Refills: 1 | Status: SHIPPED | OUTPATIENT
Start: 2022-11-18 | End: 2022-12-29

## 2022-11-18 RX ORDER — LOSARTAN POTASSIUM 50 MG/1
50 TABLET ORAL DAILY
Qty: 90 TABLET | Refills: 3 | Status: SHIPPED | OUTPATIENT
Start: 2022-11-18 | End: 2023-04-17

## 2022-11-18 RX ORDER — LOSARTAN POTASSIUM 25 MG/1
25 TABLET ORAL DAILY
Qty: 90 TABLET | Refills: 3 | Status: SHIPPED | OUTPATIENT
Start: 2022-11-18 | End: 2023-12-19

## 2022-11-18 NOTE — PROGRESS NOTES
Subjective:    Patient ID:  Breanne Gregory is a 72 y.o. female who presents for Coronary Artery Disease        HPI  DISCUSSED LABS AND GOALS CMP WITH SODIUM , BLOOD GLUCOSE 117 NON FASTING, DOING OK, FALLS,  SEE ROS    Past Medical History:   Diagnosis Date    Arthritis     Asthma, chronic     Combined hyperlipidemia associated with type 2 diabetes mellitus     DM (diabetes mellitus)     Gastroparesis     HTN (hypertension), benign     Hypercholesteremia     Mild carotid artery disease 2021    Unspecified disorder of kidney and ureter     kidney stones     Past Surgical History:   Procedure Laterality Date    APPENDECTOMY      ARTHROSCOPIC REPAIR OF ROTATOR CUFF OF SHOULDER Left 2021    Procedure: REPAIR, ROTATOR CUFF, ARTHROSCOPIC;  Surgeon: Priyank Thomas II, MD;  Location: [facility] OR;  Service: Orthopedics;  Laterality: Left;  possible graft agumentation    ARTHROSCOPY OF SHOULDER WITH DECOMPRESSION OF SUBACROMIAL SPACE Left 2021    Procedure: ARTHROSCOPY, SHOULDER, WITH SUBACROMIAL SPACE DECOMPRESSION;  Surgeon: Priyank Thomas II, MD;  Location: [facility] OR;  Service: Orthopedics;  Laterality: Left;    ARTHROSCOPY OF SHOULDER WITH REMOVAL OF DISTAL CLAVICLE Left 2021    Procedure: ARTHROSCOPY, SHOULDER, WITH DISTAL CLAVICLE EXCISION;  Surgeon: Priyank Thomas II, MD;  Location: [facility] OR;  Service: Orthopedics;  Laterality: Left;    CARDIAC CATHETERIZATION  2006    CARPAL TUNNEL RELEASE Left 2013    Left CTR    CARPAL TUNNEL RELEASE Left 2007    Left CTR    CARPAL TUNNEL RELEASE Right 2007    Right CTR     SECTION      COLONOSCOPY      MIGUEL    COLONOSCOPY W/ BIOPSIES  2011    precancer, repeat  Miguel    CORONARY ANGIOGRAPHY N/A 1/10/2019    Procedure: ANGIOGRAM, CORONARY ARTERY;  Surgeon: Sharmaine Grigsby MD;  Location: [facility] CATH;  Service: Cardiology;  Laterality: N/A;    ELBOW SURGERY Left 2013    Left Ulnar Nerve Transposition    FIXATION OF  TENDON Left 6/30/2021    Procedure: FIXATION, TENDON - open biceps tenodesis;  Surgeon: Priyank Thomas II, MD;  Location: [facility] OR;  Service: Orthopedics;  Laterality: Left;    JOINT REPLACEMENT Right 06/30/2015    Right TKA    JOINT REPLACEMENT Left 08/25/2008    Left TKA    LEFT HEART CATHETERIZATION Left 1/10/2019    Procedure: Left heart cath;  Surgeon: Sharmaine Grigsby MD;  Location: [facility] CATH;  Service: Cardiology;  Laterality: Left;    lip surgery      skin cancer    PARATHYROID GLAND SURGERY      REVERSE TOTAL SHOULDER ARTHROPLASTY Left 11/24/2021    Procedure: ARTHROPLASTY, SHOULDER, TOTAL, REVERSE;  Surgeon: Priyank Thomas II, MD;  Location: [facility] OR;  Service: Orthopedics;  Laterality: Left;    SHOULDER ARTHROSCOPY Right 06/15/2004    Right Shoulder Scope    TONSILLECTOMY      TOTAL ABDOMINAL HYSTERECTOMY  age 49    ovaries remain done for benign reasons    TOTAL KNEE ARTHROPLASTY  2008    tvt       Family History   Problem Relation Age of Onset    Asthma Mother     Heart disease Father     Heart attack Son      Social History     Socioeconomic History    Marital status:    Tobacco Use    Smoking status: Never    Smokeless tobacco: Never   Substance and Sexual Activity    Alcohol use: Yes     Comment: ocassioanlly    Drug use: No    Sexual activity: Yes     Partners: Male     Birth control/protection: Post-menopausal, Surgical       Review of patient's allergies indicates:  No Known Allergies    Current Outpatient Medications:     acetaminophen (TYLENOL) 325 MG tablet, Take 2 tablets (650 mg total) by mouth every 6 (six) hours., Disp: 56 tablet, Rfl: 0    acyclovir (ZOVIRAX) 400 MG tablet, TAKE ONE TABLET BY MOUTH THREE TIMES DAILY FOR 5-7 DAYS FEVER BLISTERS, Disp: , Rfl:     ALBUTEROL INHL, Inhale 2 puffs into the lungs daily as needed., Disp: , Rfl:     ascorbic acid, vitamin C, (VITAMIN C) 500 MG tablet, Take 500 mg by mouth once daily., Disp: , Rfl:     aspirin 81 MG Chew, Take 81 mg by  mouth every evening., Disp: , Rfl:     biotin 1 mg Cap, Take 1 mg by mouth once daily., Disp: , Rfl:     cetirizine (ZYRTEC) 10 MG tablet, Take 10 mg by mouth once daily., Disp: , Rfl:     colestipoL (COLESTID) 1 gram Tab, Take 1 g by mouth 2 (two) times daily., Disp: , Rfl:     cyclobenzaprine (FLEXERIL) 10 MG tablet, Take 1 tablet (10 mg total) by mouth every 12 (twelve) hours as needed for Muscle spasms., Disp: 40 tablet, Rfl: 0    docusate sodium 100 mg capsule, Take 100 mg by mouth 2 (two) times daily., Disp: 30 tablet, Rfl: 0    fluticasone furoate-vilanteroL (BREO) 100-25 mcg/dose diskus inhaler, Inhale 1 puff into the lungs nightly as needed. , Disp: , Rfl:     fluticasone propionate (FLONASE) 50 mcg/actuation nasal spray, 2 sprays by Nasal route daily as needed. , Disp: , Rfl:     glimepiride (AMARYL) 4 MG tablet, Take 1 tablet by mouth 2 (two) times daily. , Disp: , Rfl: 0    guaiFENesin-codeine 100-10 mg/5 ml (TUSSI-ORGANIDIN NR)  mg/5 mL syrup, Take by mouth., Disp: , Rfl:     hydrOXYchloroQUINE (PLAQUENIL) 200 mg tablet, Take by mouth., Disp: , Rfl:     hyoscyamine (LEVSIN/SL) 0.125 mg Subl, Place 1 tablet under the tongue every 6 (six) hours as needed. , Disp: , Rfl: 0    ibuprofen (ADVIL,MOTRIN) 400 MG tablet, Take 1 tablet (400 mg total) by mouth every 6 (six) hours as needed., Disp: 30 tablet, Rfl: 1    latanoprost 0.005 % ophthalmic solution, Place 1 drop into both eyes nightly., Disp: , Rfl:     multivitamin capsule, Take 1 capsule by mouth once daily., Disp: , Rfl:     omeprazole (PRILOSEC) 40 MG capsule, Take 40 mg by mouth every morning. , Disp: , Rfl:     oxyCODONE-acetaminophen (PERCOCET) 7.5-325 mg per tablet, Take 1 tablet by mouth every 12 (twelve) hours as needed for Pain., Disp: 30 tablet, Rfl: 0    PROAIR HFA 90 mcg/actuation inhaler, Inhale 1 puff into the lungs every 6 (six) hours as needed. , Disp: , Rfl: 0    rosuvastatin (CRESTOR) 20 MG tablet, Take 1 tablet (20 mg total) by  mouth every evening., Disp: 90 tablet, Rfl: 1    semaglutide (OZEMPIC) 0.25 mg or 0.5 mg(2 mg/1.5 mL) pen injector, Inject into the skin once a week. sundays, Disp: , Rfl:     sucralfate (CARAFATE) 1 gram tablet, Take 1 g by mouth 2 (two) times daily. , Disp: , Rfl:     verapamiL (CALAN-SR) 180 MG CR tablet, TAKE 1 TABLET BY MOUTH ONCE DAILY, Disp: 90 tablet, Rfl: 1    vitamin D 1000 units Tab, Take 185 mg by mouth once daily., Disp: , Rfl:     losartan (COZAAR) 25 MG tablet, Take 1 tablet (25 mg total) by mouth once daily., Disp: 90 tablet, Rfl: 3    losartan (COZAAR) 50 MG tablet, Take 1 tablet (50 mg total) by mouth once daily., Disp: 90 tablet, Rfl: 3    nitroGLYCERIN (NITROSTAT) 0.4 MG SL tablet, Place 1 tablet (0.4 mg total) under the tongue every 5 (five) minutes as needed., Disp: 25 tablet, Rfl: 0    triamterene-hydrochlorothiazide 37.5-25 mg (DYAZIDE) 37.5-25 mg per capsule, Take 1 capsule by mouth twice a week., Disp: 24 capsule, Rfl: 1    Review of Systems   Constitutional: Positive for weight loss (DIET). Negative for chills, diaphoresis, fever, malaise/fatigue and night sweats.   HENT:  Negative for congestion (SOME) and sore throat.    Eyes:  Negative for blurred vision and visual disturbance.   Cardiovascular:  Negative for chest pain, claudication, cyanosis, dyspnea on exertion (MILD), irregular heartbeat, leg swelling, near-syncope, orthopnea, palpitations, paroxysmal nocturnal dyspnea and syncope.   Respiratory:  Negative for cough, hemoptysis, shortness of breath and wheezing.    Hematologic/Lymphatic: Negative for adenopathy. Does not bruise/bleed easily.   Skin:  Negative for color change and rash.   Musculoskeletal:  Positive for falls (REACHING FOR THE DOOR) and joint pain (L SHOULDER). Neck pain: ONCE POSITIONAL.  Gastrointestinal:  Negative for abdominal pain, change in bowel habit, dysphagia, jaundice, melena and nausea (OCC).   Genitourinary:  Negative for dysuria and flank pain.  "  Neurological:  Negative for brief paralysis, dizziness (OCC), focal weakness, light-headedness, loss of balance (SOME), paresthesias and weakness.   Psychiatric/Behavioral:  Negative for altered mental status and depression.       Objective:      Vitals:    11/18/22 0808   BP: 127/69   Pulse: 68   Weight: 87.4 kg (192 lb 10.9 oz)   Height: 5' 4" (1.626 m)   PainSc: 0-No pain     Body mass index is 33.07 kg/m².    Physical Exam  Constitutional:       Appearance: She is well-developed. She is obese.   HENT:      Head: Normocephalic and atraumatic.   Eyes:      Extraocular Movements: Extraocular movements intact.      Conjunctiva/sclera: Conjunctivae normal.      Pupils: Pupils are equal, round, and reactive to light.   Neck:      Vascular: Normal carotid pulses. No JVD.   Cardiovascular:      Rate and Rhythm: Normal rate and regular rhythm. No extrasystoles are present.     Pulses:           Carotid pulses are 2+ on the right side and 2+ on the left side.       Radial pulses are 2+ on the right side and 2+ on the left side.        Posterior tibial pulses are 2+ on the right side and 2+ on the left side.      Heart sounds: Murmur heard.   Systolic murmur is present with a grade of 2/6 at the lower left sternal border.     No friction rub. No gallop.   Pulmonary:      Effort: Pulmonary effort is normal.      Breath sounds: Normal breath sounds. No rales.   Abdominal:      Palpations: Abdomen is soft. There is no hepatomegaly.      Tenderness: There is no abdominal tenderness.   Musculoskeletal:         General: Normal range of motion.      Cervical back: Neck supple.      Right lower leg: No edema.      Left lower leg: No edema.   Skin:     General: Skin is warm and dry.      Capillary Refill: Capillary refill takes less than 2 seconds.   Neurological:      General: No focal deficit present.      Mental Status: She is alert and oriented to person, place, and time.   Psychiatric:         Attention and Perception: " Attention normal.         Mood and Affect: Mood normal.         Speech: Speech normal.         Behavior: Behavior normal.               ..    Chemistry        Component Value Date/Time     (L) 11/11/2022 0737     06/16/2015 0950    K 4.1 11/11/2022 0737    K 4.3 06/16/2015 0950    CL 97 11/11/2022 0737     06/16/2015 0950    CO2 23 11/11/2022 0737    BUN 15 11/11/2022 0737    CREATININE 0.9 11/11/2022 0737    CREATININE 1.04 06/16/2015 0950     (H) 11/11/2022 0737        Component Value Date/Time    CALCIUM 9.8 11/11/2022 0737    ALKPHOS 77 11/11/2022 0737    AST 23 11/11/2022 0737    ALT 16 11/11/2022 0737    BILITOT 0.7 11/11/2022 0737    ESTGFRAFRICA >60.0 05/16/2022 0823    EGFRNONAA >60.0 05/16/2022 0823            ..  Lab Results   Component Value Date    CHOL 133 05/16/2022    CHOL 149 04/05/2019    CHOL 143 12/10/2018     Lab Results   Component Value Date    HDL 55 05/16/2022    HDL 65 04/05/2019    HDL 60 12/10/2018     Lab Results   Component Value Date    LDLCALC 61.4 (L) 05/16/2022    LDLCALC 63.0 04/05/2019    LDLCALC 56.8 (L) 12/10/2018     Lab Results   Component Value Date    TRIG 83 05/16/2022    TRIG 105 04/05/2019    TRIG 131 12/10/2018     Lab Results   Component Value Date    CHOLHDL 41.4 05/16/2022    CHOLHDL 43.6 04/05/2019    CHOLHDL 42.0 12/10/2018     ..  Lab Results   Component Value Date    WBC 5.88 05/16/2022    HGB 12.4 05/16/2022    HCT 37.5 05/16/2022    MCV 85 05/16/2022     (L) 05/16/2022       Test(s) Reviewed  I have reviewed the following in detail:  [] Stress test   [] Angiography   [] Echocardiogram   [x] Labs   [] Other:       Assessment:         ICD-10-CM ICD-9-CM   1. Coronary artery disease involving native coronary artery of native heart without angina pectoris  I25.10 414.01   2. Non-rheumatic mitral regurgitation  I34.0 424.0   3. Hyponatremia  E87.1 276.1   4. HTN (hypertension), benign  I10 401.1   5. Hypercholesterolemia  E78.00 272.0    6. Obesity, Class I, BMI 30-34.9  E66.9 278.00   7. Mild carotid artery disease  I77.9 447.9   8. Imbalance  R26.89 781.2     Problem List Items Addressed This Visit          Cardiac/Vascular    Hypercholesterolemia    Relevant Orders    Comprehensive Metabolic Panel    Lipid Panel    HTN (hypertension), benign    Non-rheumatic mitral regurgitation    Coronary artery disease involving native coronary artery of native heart - Primary    Relevant Orders    Comprehensive Metabolic Panel    Lipid Panel    CBC Auto Differential    Mild carotid artery disease       Renal/    Hyponatremia    Relevant Orders    Comprehensive Metabolic Panel       Endocrine    Obesity, Class I, BMI 30-34.9       Other    Imbalance    Relevant Orders    Ambulatory referral/consult to Neurology        Plan:         DECREASE FREE WATER, AND DECREASE DIEURETICS TO TWICE/W,CHANGHE LOSARTAN 75 TO 70=25, DIFFICULTY CUTTING, NEURO REFERRAL, ALL CV CLINICALLY STABLE, NO ANGINA, NO HF, NO TIA, NO CLINICAL ARRHYTHMIA,CONTINUE CURRENT MEDS, EDUCATION, DIET, EXERCISE , WEIGHT LOSS, RTC IN 6 MO  WITH LABS,DISCUSSED WITH PT AND   Coronary artery disease involving native coronary artery of native heart without angina pectoris  -     Comprehensive Metabolic Panel; Future; Expected date: 05/18/2023  -     Lipid Panel; Future; Expected date: 05/18/2023  -     CBC Auto Differential; Future; Expected date: 05/18/2023    Non-rheumatic mitral regurgitation    Hyponatremia  -     Comprehensive Metabolic Panel; Future; Expected date: 05/18/2023    HTN (hypertension), benign    Hypercholesterolemia  -     Comprehensive Metabolic Panel; Future; Expected date: 05/18/2023  -     Lipid Panel; Future; Expected date: 05/18/2023    Obesity, Class I, BMI 30-34.9    Mild carotid artery disease  Comments:  no TIA    Imbalance  Comments:  neuro referral  Orders:  -     Ambulatory referral/consult to Neurology; Future; Expected date: 11/25/2022    Other orders  -      triamterene-hydrochlorothiazide 37.5-25 mg (DYAZIDE) 37.5-25 mg per capsule; Take 1 capsule by mouth twice a week.  Dispense: 24 capsule; Refill: 1  -     losartan (COZAAR) 50 MG tablet; Take 1 tablet (50 mg total) by mouth once daily.  Dispense: 90 tablet; Refill: 3  -     losartan (COZAAR) 25 MG tablet; Take 1 tablet (25 mg total) by mouth once daily.  Dispense: 90 tablet; Refill: 3  RTC Low level/low impact aerobic exercise 5x's/wk. Heart healthy diet and risk factor modification.    See labs and med orders.    Aerobic exercise 5x's/wk. Heart healthy diet and risk factor modification.    See labs and med orders.

## 2022-11-19 PROBLEM — R26.89 IMBALANCE: Status: ACTIVE | Noted: 2022-11-19

## 2022-12-08 ENCOUNTER — TELEPHONE (OUTPATIENT)
Dept: NEUROLOGY | Facility: CLINIC | Age: 72
End: 2022-12-08
Payer: MEDICARE

## 2022-12-12 PROBLEM — M75.101 ROTATOR CUFF SYNDROME OF RIGHT SHOULDER: Status: ACTIVE | Noted: 2022-12-12

## 2023-01-06 ENCOUNTER — OFFICE VISIT (OUTPATIENT)
Dept: NEUROLOGY | Facility: CLINIC | Age: 73
End: 2023-01-06
Payer: MEDICARE

## 2023-01-06 VITALS
SYSTOLIC BLOOD PRESSURE: 176 MMHG | DIASTOLIC BLOOD PRESSURE: 72 MMHG | HEART RATE: 69 BPM | BODY MASS INDEX: 33.24 KG/M2 | WEIGHT: 193.69 LBS | RESPIRATION RATE: 17 BRPM

## 2023-01-06 DIAGNOSIS — E66.9 OBESITY, CLASS I, BMI 30-34.9: ICD-10-CM

## 2023-01-06 DIAGNOSIS — R26.89 IMBALANCE: Primary | ICD-10-CM

## 2023-01-06 DIAGNOSIS — E87.1 HYPONATREMIA: ICD-10-CM

## 2023-01-06 PROCEDURE — 1126F AMNT PAIN NOTED NONE PRSNT: CPT | Mod: CPTII,S$GLB,, | Performed by: NURSE PRACTITIONER

## 2023-01-06 PROCEDURE — 3288F PR FALLS RISK ASSESSMENT DOCUMENTED: ICD-10-PCS | Mod: CPTII,S$GLB,, | Performed by: NURSE PRACTITIONER

## 2023-01-06 PROCEDURE — 3077F PR MOST RECENT SYSTOLIC BLOOD PRESSURE >= 140 MM HG: ICD-10-PCS | Mod: CPTII,S$GLB,, | Performed by: NURSE PRACTITIONER

## 2023-01-06 PROCEDURE — 99203 OFFICE O/P NEW LOW 30 MIN: CPT | Mod: S$GLB,,, | Performed by: NURSE PRACTITIONER

## 2023-01-06 PROCEDURE — 99999 PR PBB SHADOW E&M-EST. PATIENT-LVL V: ICD-10-PCS | Mod: PBBFAC,,, | Performed by: NURSE PRACTITIONER

## 2023-01-06 PROCEDURE — 3077F SYST BP >= 140 MM HG: CPT | Mod: CPTII,S$GLB,, | Performed by: NURSE PRACTITIONER

## 2023-01-06 PROCEDURE — 1160F PR REVIEW ALL MEDS BY PRESCRIBER/CLIN PHARMACIST DOCUMENTED: ICD-10-PCS | Mod: CPTII,S$GLB,, | Performed by: NURSE PRACTITIONER

## 2023-01-06 PROCEDURE — 1100F PR PT FALLS ASSESS DOC 2+ FALLS/FALL W/INJURY/YR: ICD-10-PCS | Mod: CPTII,S$GLB,, | Performed by: NURSE PRACTITIONER

## 2023-01-06 PROCEDURE — 99203 PR OFFICE/OUTPT VISIT, NEW, LEVL III, 30-44 MIN: ICD-10-PCS | Mod: S$GLB,,, | Performed by: NURSE PRACTITIONER

## 2023-01-06 PROCEDURE — 3288F FALL RISK ASSESSMENT DOCD: CPT | Mod: CPTII,S$GLB,, | Performed by: NURSE PRACTITIONER

## 2023-01-06 PROCEDURE — 1159F MED LIST DOCD IN RCRD: CPT | Mod: CPTII,S$GLB,, | Performed by: NURSE PRACTITIONER

## 2023-01-06 PROCEDURE — 99999 PR PBB SHADOW E&M-EST. PATIENT-LVL V: CPT | Mod: PBBFAC,,, | Performed by: NURSE PRACTITIONER

## 2023-01-06 PROCEDURE — 3078F PR MOST RECENT DIASTOLIC BLOOD PRESSURE < 80 MM HG: ICD-10-PCS | Mod: CPTII,S$GLB,, | Performed by: NURSE PRACTITIONER

## 2023-01-06 PROCEDURE — 1159F PR MEDICATION LIST DOCUMENTED IN MEDICAL RECORD: ICD-10-PCS | Mod: CPTII,S$GLB,, | Performed by: NURSE PRACTITIONER

## 2023-01-06 PROCEDURE — 1100F PTFALLS ASSESS-DOCD GE2>/YR: CPT | Mod: CPTII,S$GLB,, | Performed by: NURSE PRACTITIONER

## 2023-01-06 PROCEDURE — 1160F RVW MEDS BY RX/DR IN RCRD: CPT | Mod: CPTII,S$GLB,, | Performed by: NURSE PRACTITIONER

## 2023-01-06 PROCEDURE — 3008F PR BODY MASS INDEX (BMI) DOCUMENTED: ICD-10-PCS | Mod: CPTII,S$GLB,, | Performed by: NURSE PRACTITIONER

## 2023-01-06 PROCEDURE — 3078F DIAST BP <80 MM HG: CPT | Mod: CPTII,S$GLB,, | Performed by: NURSE PRACTITIONER

## 2023-01-06 PROCEDURE — 1126F PR PAIN SEVERITY QUANTIFIED, NO PAIN PRESENT: ICD-10-PCS | Mod: CPTII,S$GLB,, | Performed by: NURSE PRACTITIONER

## 2023-01-06 PROCEDURE — 3008F BODY MASS INDEX DOCD: CPT | Mod: CPTII,S$GLB,, | Performed by: NURSE PRACTITIONER

## 2023-01-06 RX ORDER — CIPROFLOXACIN AND DEXAMETHASONE 3; 1 MG/ML; MG/ML
SUSPENSION/ DROPS AURICULAR (OTIC)
COMMUNITY
Start: 2022-12-12 | End: 2023-10-30

## 2023-01-06 RX ORDER — CLOTRIMAZOLE AND BETAMETHASONE DIPROPIONATE 10; .64 MG/G; MG/G
CREAM TOPICAL DAILY PRN
COMMUNITY
Start: 2022-12-20

## 2023-01-06 NOTE — PROGRESS NOTES
NEUROLOGY  Outpatient CONSULT    [facility]  [address] [facility], [place] [address]  [phone] (office) / [phone] (fax)    Patient Name:  Breanne Gregory  :  1950  MR #:  [id number]  Acct #:  [id number]    Date of Neurology Consult: 2023  Name of Provider: RIO Nicholas    Other Physicians:  Jason Walsh Iii, MD (Primary Care Physician); Sharmaine Grigsby MD (Referring)      Chief Complaint: Fall      History of Present Illness (HPI):  Breanne Gregory is a right handed 72 y.o. female with a PMHX of asthma, HLD, DM, HTN, CAD, kidney disorder  Patient is here today for previous frequent falls. They occurred around October and has a total of 3 falls. She denies assocaited dizziness, spinning, headaches, LOC etc. Several falls were due to tripping. The last major fall occurred when she went to grab for the door but didn't. She fell backwards and hit her head. She denies significant injuries. Her right ear began to hurt and saw her ENT who then said she had an ear infection and her crystals needed to be adjusted.  She was instructed to do home epley manuvers which has helped. Her ear infection cleared up with antibiotics. She never did go to outpatient balance therapy stating she didn't need it.     In November she was reported to have low sodium levels. Her cardiologist instructed her to restrict fluids.         Past Medical, Surgical, Family & Social History:   Past Medical History:   Diagnosis Date    Arthritis     Asthma, chronic     Combined hyperlipidemia associated with type 2 diabetes mellitus     DM (diabetes mellitus)     Gastroparesis     HTN (hypertension), benign     Hypercholesteremia     Mild carotid artery disease 2021    Unspecified disorder of kidney and ureter     kidney stones     Past Surgical History:   Procedure Laterality Date    APPENDECTOMY      ARTHROSCOPIC REPAIR OF ROTATOR CUFF OF SHOULDER Left 2021    Procedure: REPAIR,  ROTATOR CUFF, ARTHROSCOPIC;  Surgeon: Priyank Thomas II, MD;  Location: [facility] OR;  Service: Orthopedics;  Laterality: Left;  possible graft agumentation    ARTHROSCOPY OF SHOULDER WITH DECOMPRESSION OF SUBACROMIAL SPACE Left 2021    Procedure: ARTHROSCOPY, SHOULDER, WITH SUBACROMIAL SPACE DECOMPRESSION;  Surgeon: Priyank Thomas II, MD;  Location: [facility] OR;  Service: Orthopedics;  Laterality: Left;    ARTHROSCOPY OF SHOULDER WITH REMOVAL OF DISTAL CLAVICLE Left 2021    Procedure: ARTHROSCOPY, SHOULDER, WITH DISTAL CLAVICLE EXCISION;  Surgeon: Priyank Thomas II, MD;  Location: [facility] OR;  Service: Orthopedics;  Laterality: Left;    CARDIAC CATHETERIZATION  2006    CARPAL TUNNEL RELEASE Left 2013    Left CTR    CARPAL TUNNEL RELEASE Left 2007    Left CTR    CARPAL TUNNEL RELEASE Right 2007    Right CTR     SECTION      COLONOSCOPY      NICHO    COLONOSCOPY W/ BIOPSIES      precancer, repeat  Nicho    CORONARY ANGIOGRAPHY N/A 1/10/2019    Procedure: ANGIOGRAM, CORONARY ARTERY;  Surgeon: Sharmaine Grigsby MD;  Location: [facility] CATH;  Service: Cardiology;  Laterality: N/A;    ELBOW SURGERY Left 2013    Left Ulnar Nerve Transposition    FIXATION OF TENDON Left 2021    Procedure: FIXATION, TENDON - open biceps tenodesis;  Surgeon: Priyank Thomas II, MD;  Location: [facility] OR;  Service: Orthopedics;  Laterality: Left;    JOINT REPLACEMENT Right 2015    Right TKA    JOINT REPLACEMENT Left 2008    Left TKA    LEFT HEART CATHETERIZATION Left 1/10/2019    Procedure: Left heart cath;  Surgeon: Sharmaine Grigsby MD;  Location: [facility] CATH;  Service: Cardiology;  Laterality: Left;    lip surgery      skin cancer    PARATHYROID GLAND SURGERY      REVERSE TOTAL SHOULDER ARTHROPLASTY Left 2021    Procedure: ARTHROPLASTY, SHOULDER, TOTAL, REVERSE;  Surgeon: Priyank Thomas II, MD;  Location: [facility] OR;  Service: Orthopedics;  Laterality: Left;    SHOULDER ARTHROSCOPY  Right 06/15/2004    Right Shoulder Scope    TONSILLECTOMY      TOTAL ABDOMINAL HYSTERECTOMY  age 49    ovaries remain done for benign reasons    TOTAL KNEE ARTHROPLASTY      tvt       Family History   Problem Relation Age of Onset    Asthma Mother     Heart disease Father     Heart attack Son      Alcohol use:  reports current alcohol use.   (Of note, 0.6 oz = 1 beer or 6 oz = 10 beers).  Tobacco use:  reports that she has never smoked. She has never used smokeless tobacco.  Street drug use:  reports no history of drug use.  Allergies: Patient has no known allergies..    Home Medications:     Current Outpatient Medications:     acetaminophen (TYLENOL) 325 MG tablet, Take 2 tablets (650 mg total) by mouth every 6 (six) hours., Disp: 56 tablet, Rfl: 0    acyclovir (ZOVIRAX) 400 MG tablet, TAKE ONE TABLET BY MOUTH THREE TIMES DAILY FOR 5-7 DAYS FEVER BLISTERS, Disp: , Rfl:     ALBUTEROL INHL, Inhale 2 puffs into the lungs daily as needed., Disp: , Rfl:     ascorbic acid, vitamin C, (VITAMIN C) 500 MG tablet, Take 500 mg by mouth once daily., Disp: , Rfl:     aspirin 81 MG Chew, Take 81 mg by mouth every evening., Disp: , Rfl:     biotin 1 mg Cap, Take 1 mg by mouth once daily., Disp: , Rfl:     cetirizine (ZYRTEC) 10 MG tablet, Take 10 mg by mouth once daily., Disp: , Rfl:     ciprofloxacin-dexAMETHasone 0.3-0.1% (CIPRODEX) 0.3-0.1 % DrpS, SMARTSI Drop(s) Right Ear Twice Daily, Disp: , Rfl:     clotrimazole-betamethasone 1-0.05% (LOTRISONE) cream, Apply topically daily as needed., Disp: , Rfl:     colestipoL (COLESTID) 1 gram Tab, Take 1 g by mouth 2 (two) times daily., Disp: , Rfl:     cyclobenzaprine (FLEXERIL) 10 MG tablet, Take 1 tablet (10 mg total) by mouth every 12 (twelve) hours as needed for Muscle spasms., Disp: 40 tablet, Rfl: 0    docusate sodium 100 mg capsule, Take 100 mg by mouth 2 (two) times daily., Disp: 30 tablet, Rfl: 0    fluticasone furoate-vilanteroL (BREO) 100-25 mcg/dose diskus inhaler,  Inhale 1 puff into the lungs nightly as needed. , Disp: , Rfl:     fluticasone propionate (FLONASE) 50 mcg/actuation nasal spray, 2 sprays by Nasal route daily as needed. , Disp: , Rfl:     glimepiride (AMARYL) 4 MG tablet, Take 1 tablet by mouth 2 (two) times daily. , Disp: , Rfl: 0    guaiFENesin-codeine 100-10 mg/5 ml (TUSSI-ORGANIDIN NR)  mg/5 mL syrup, Take by mouth., Disp: , Rfl:     hydrOXYchloroQUINE (PLAQUENIL) 200 mg tablet, Take by mouth., Disp: , Rfl:     hyoscyamine (LEVSIN/SL) 0.125 mg Subl, Place 1 tablet under the tongue every 6 (six) hours as needed. , Disp: , Rfl: 0    ibuprofen (ADVIL,MOTRIN) 400 MG tablet, Take 1 tablet (400 mg total) by mouth every 6 (six) hours as needed., Disp: 30 tablet, Rfl: 1    latanoprost 0.005 % ophthalmic solution, Place 1 drop into both eyes nightly., Disp: , Rfl:     losartan (COZAAR) 25 MG tablet, Take 1 tablet (25 mg total) by mouth once daily., Disp: 90 tablet, Rfl: 3    multivitamin capsule, Take 1 capsule by mouth once daily., Disp: , Rfl:     omeprazole (PRILOSEC) 40 MG capsule, Take 40 mg by mouth every morning. , Disp: , Rfl:     oxyCODONE-acetaminophen (PERCOCET) 7.5-325 mg per tablet, Take 1 tablet by mouth every 12 (twelve) hours as needed for Pain., Disp: 30 tablet, Rfl: 0    PROAIR HFA 90 mcg/actuation inhaler, Inhale 1 puff into the lungs every 6 (six) hours as needed. , Disp: , Rfl: 0    rosuvastatin (CRESTOR) 20 MG tablet, Take 1 tablet (20 mg total) by mouth every evening., Disp: 90 tablet, Rfl: 1    semaglutide (OZEMPIC) 0.25 mg or 0.5 mg(2 mg/1.5 mL) pen injector, Inject into the skin once a week. sundays, Disp: , Rfl:     sucralfate (CARAFATE) 1 gram tablet, Take 1 g by mouth 2 (two) times daily. , Disp: , Rfl:     triamterene-hydrochlorothiazide 37.5-25 mg (DYAZIDE) 37.5-25 mg per capsule, TAKE 1 CAPSULE BY MOUTH  EVERY OTHER DAY, Disp: 45 capsule, Rfl: 3    verapamiL (CALAN-SR) 180 MG CR tablet, TAKE 1 TABLET BY MOUTH ONCE DAILY, Disp: 90  tablet, Rfl: 3    vitamin D 1000 units Tab, Take 185 mg by mouth once daily., Disp: , Rfl:     losartan (COZAAR) 50 MG tablet, Take 1 tablet (50 mg total) by mouth once daily. (Patient not taking: Reported on 1/6/2023), Disp: 90 tablet, Rfl: 3    nitroGLYCERIN (NITROSTAT) 0.4 MG SL tablet, Place 1 tablet (0.4 mg total) under the tongue every 5 (five) minutes as needed., Disp: 25 tablet, Rfl: 0    Physical Examination:  BP (!) 176/72 (BP Location: Right arm, Patient Position: Sitting, BP Method: Medium (Automatic))   Pulse 69   Resp 17   Wt 87.9 kg (193 lb 10.8 oz)   BMI 33.24 kg/m²     GENERAL:  General appearance: Well, non-toxic appearing.  No apparent distress.  Neck: supple.  .    MENTAL STATUS:  Alertness, attention span & concentration: normal.  Language: normal.  Orientation to self, place & time:  normal.  Memory, recent & remote: normal.  Fund of knowledge: normal.      SPEECH:  Clear and fluent.  Follows complex commands.      CRANIAL NERVES:  Cranial Nerves II-XII were examined.  II - Visual fields: normal.  III, IV, VI: PERRL, EOMI, No ptosis, No nystagmus.  V - Facial sensation: normal.  VII - Face symmetry & mobility: normal.  VIII - Hearing: normal  IX, X - Palate: mobile & midline.  XI - Shoulder shrug: normal.  XII - Tongue protrusion: normal.        GROSS MOTOR:  Gait & station: mildly antalgic  Tone: normal.  Abnormal movements: none.  Finger-nose: normal.  Rapid alternating movements: normal.  Pronator drift: normal      MUSCLE STRENGTH:   Hand grasp:   - right:5/5   - left:5/5    Fascics Atrophy RIGHT    LEFT Atrophy Fascics     5 Neck Ext. 5       5 Neck Flex 5       5 Deltoids 5       5 Biceps 5       5 Triceps 5       5 Forearm.Pr. 5       5 Iliopsoas flex    5       5 Hip Abduct 5       5 Hip Adduct 5       5 Quads 5       5 Hams 5       5 Dorsiflex 5       5 Plantar Flex 5                REFLEXES:    RIGHT Reflex   LEFT   2+ Biceps 2+   2+ Brachiorad. 2+        2 Patellar 2              SENSORY:  Light touch: Normal throughout.           Diagnostic Data Reviewed:        Lab Results   Component Value Date    WBC 5.88 05/16/2022    HGB 12.4 05/16/2022    HCT 37.5 05/16/2022    MCV 85 05/16/2022     (L) 05/16/2022        CMP  Sodium   Date Value Ref Range Status   11/11/2022 130 (L) 136 - 145 mmol/L Final   06/16/2015 141 137 - 145 MMOL/L Final     Potassium   Date Value Ref Range Status   11/11/2022 4.1 3.5 - 5.1 mmol/L Final   06/16/2015 4.3 3.5 - 5.1 MMOL/L Final     Chloride   Date Value Ref Range Status   11/11/2022 97 95 - 110 mmol/L Final   06/16/2015 100 98 - 107 MMOL/L Final     CO2   Date Value Ref Range Status   11/11/2022 23 23 - 29 mmol/L Final     Glucose   Date Value Ref Range Status   11/11/2022 117 (H) 70 - 110 mg/dL Final     BUN   Date Value Ref Range Status   11/11/2022 15 8 - 23 mg/dL Final     Creatinine   Date Value Ref Range Status   11/11/2022 0.9 0.5 - 1.4 mg/dL Final   06/16/2015 1.04 0.52 - 1.04 MG/DL Final     Calcium   Date Value Ref Range Status   11/11/2022 9.8 8.7 - 10.5 mg/dL Final     Total Protein   Date Value Ref Range Status   11/11/2022 6.5 6.0 - 8.4 g/dL Final     Albumin   Date Value Ref Range Status   11/11/2022 3.6 3.5 - 5.2 g/dL Final   06/16/2015 4.3 3.5 - 5.0 G/DL Final     Total Bilirubin   Date Value Ref Range Status   11/11/2022 0.7 0.1 - 1.0 mg/dL Final     Comment:     For infants and newborns, interpretation of results should be based  on gestational age, weight and in agreement with clinical  observations.    Premature Infant recommended reference ranges:  Up to 24 hours.............<8.0 mg/dL  Up to 48 hours............<12.0 mg/dL  3-5 days..................<15.0 mg/dL  6-29 days.................<15.0 mg/dL       Alkaline Phosphatase   Date Value Ref Range Status   11/11/2022 77 55 - 135 U/L Final     AST   Date Value Ref Range Status   11/11/2022 23 10 - 40 U/L Final     ALT   Date Value Ref Range Status   11/11/2022 16 10 - 44  U/L Final     Anion Gap   Date Value Ref Range Status   11/11/2022 10 8 - 16 mmol/L Final     eGFR   Date Value Ref Range Status   11/11/2022 >60.0 >60 mL/min/1.73 m^2 Final               Assessment and Plan:  Breanne Gregory is a 72 y.o. female.        Problem List Items Addressed This Visit          Renal/    Hyponatremia    Current Assessment & Plan     Na 130 in November  Seen by cardiology and instructed to restrict fluid intake             Endocrine    Obesity, Class I, BMI 30-34.9    Current Assessment & Plan     Diet modification             Other    Imbalance - Primary    Current Assessment & Plan     Patient is a 73 y/o female that presents as requested by her cardiologist for frequent falls.   She reports having several trip and falls over the last 3 months. She denies associated symptoms like LOC, spinning, lightheadedness etc.   Her most recent fall caused her to hit her head. She then developed ear pain. She saw ENT who suggested her crystals in her ear shifted and she also had an ear infection. This improved after abx and home epley maneuvers.   Today she is asymptomatic  Neuro exam non focal. Gait mildly antalgic   Pt would like to hold on outpt therapy                    Important to note, also  has a past medical history of Arthritis, Asthma, chronic, Combined hyperlipidemia associated with type 2 diabetes mellitus, DM (diabetes mellitus), Gastroparesis, HTN (hypertension), benign, Hypercholesteremia, Mild carotid artery disease (5/7/2021), and Unspecified disorder of kidney and ureter.            The patient will return to clinic as needed        All questions were answered and patient is comfortable with the plan.       Thank you very much for the opportunity to assist in this patient's care.    If you have any questions or concerns, please do not hesitate to contact me at any time.    Sincerely,     RIO Nicholas  [facility] [place]         I spent a total of  31 minutes on the day of the visit.This includes face to face time and non-face to face time preparing to see the patient (eg, review of tests), Obtaining and/or reviewing separately obtained history, Documenting clinical information in the electronic or other health record, Independently interpreting resultsand communicating results to the patient/family/caregiver, or Care coordination.

## 2023-01-06 NOTE — ASSESSMENT & PLAN NOTE
Patient is a 73 y/o female that presents as requested by her cardiologist for frequent falls.   She reports having several trip and falls over the last 3 months. She denies associated symptoms like LOC, spinning, lightheadedness etc.   Her most recent fall caused her to hit her head. She then developed ear pain. She saw ENT who suggested her crystals in her ear shifted and she also had an ear infection. This improved after abx and home epley maneuvers.   Today she is asymptomatic  Neuro exam non focal. Gait mildly antalgic   Pt would like to hold on outpt therapy

## 2023-05-22 ENCOUNTER — LAB VISIT (OUTPATIENT)
Dept: LAB | Facility: HOSPITAL | Age: 73
End: 2023-05-22
Attending: INTERNAL MEDICINE
Payer: MEDICARE

## 2023-05-22 DIAGNOSIS — I25.10 CORONARY ARTERY DISEASE INVOLVING NATIVE CORONARY ARTERY OF NATIVE HEART WITHOUT ANGINA PECTORIS: Chronic | ICD-10-CM

## 2023-05-22 DIAGNOSIS — E87.1 HYPONATREMIA: ICD-10-CM

## 2023-05-22 DIAGNOSIS — E78.00 HYPERCHOLESTEROLEMIA: ICD-10-CM

## 2023-05-22 LAB
ALBUMIN SERPL BCP-MCNC: 3.6 G/DL (ref 3.5–5.2)
ALP SERPL-CCNC: 78 U/L (ref 55–135)
ALT SERPL W/O P-5'-P-CCNC: 17 U/L (ref 10–44)
ANION GAP SERPL CALC-SCNC: 9 MMOL/L (ref 8–16)
AST SERPL-CCNC: 22 U/L (ref 10–40)
BASOPHILS # BLD AUTO: 0.09 K/UL (ref 0–0.2)
BASOPHILS NFR BLD: 1.4 % (ref 0–1.9)
BILIRUB SERPL-MCNC: 0.5 MG/DL (ref 0.1–1)
BUN SERPL-MCNC: 16 MG/DL (ref 8–23)
CALCIUM SERPL-MCNC: 9.7 MG/DL (ref 8.7–10.5)
CHLORIDE SERPL-SCNC: 100 MMOL/L (ref 95–110)
CHOLEST SERPL-MCNC: 137 MG/DL (ref 120–199)
CHOLEST/HDLC SERPL: 2.1 {RATIO} (ref 2–5)
CO2 SERPL-SCNC: 25 MMOL/L (ref 23–29)
CREAT SERPL-MCNC: 0.9 MG/DL (ref 0.5–1.4)
DIFFERENTIAL METHOD: ABNORMAL
EOSINOPHIL # BLD AUTO: 0.2 K/UL (ref 0–0.5)
EOSINOPHIL NFR BLD: 2.7 % (ref 0–8)
ERYTHROCYTE [DISTWIDTH] IN BLOOD BY AUTOMATED COUNT: 12.9 % (ref 11.5–14.5)
EST. GFR  (NO RACE VARIABLE): >60 ML/MIN/1.73 M^2
GLUCOSE SERPL-MCNC: 77 MG/DL (ref 70–110)
HCT VFR BLD AUTO: 35.7 % (ref 37–48.5)
HDLC SERPL-MCNC: 64 MG/DL (ref 40–75)
HDLC SERPL: 46.7 % (ref 20–50)
HGB BLD-MCNC: 11.8 G/DL (ref 12–16)
IMM GRANULOCYTES # BLD AUTO: 0.03 K/UL (ref 0–0.04)
IMM GRANULOCYTES NFR BLD AUTO: 0.5 % (ref 0–0.5)
LDLC SERPL CALC-MCNC: 54 MG/DL (ref 63–159)
LYMPHOCYTES # BLD AUTO: 2 K/UL (ref 1–4.8)
LYMPHOCYTES NFR BLD: 31.4 % (ref 18–48)
MCH RBC QN AUTO: 28.2 PG (ref 27–31)
MCHC RBC AUTO-ENTMCNC: 33.1 G/DL (ref 32–36)
MCV RBC AUTO: 85 FL (ref 82–98)
MONOCYTES # BLD AUTO: 0.8 K/UL (ref 0.3–1)
MONOCYTES NFR BLD: 13.3 % (ref 4–15)
NEUTROPHILS # BLD AUTO: 3.2 K/UL (ref 1.8–7.7)
NEUTROPHILS NFR BLD: 50.7 % (ref 38–73)
NONHDLC SERPL-MCNC: 73 MG/DL
NRBC BLD-RTO: 0 /100 WBC
PLATELET # BLD AUTO: 132 K/UL (ref 150–450)
PMV BLD AUTO: 11.1 FL (ref 9.2–12.9)
POTASSIUM SERPL-SCNC: 4 MMOL/L (ref 3.5–5.1)
PROT SERPL-MCNC: 6.4 G/DL (ref 6–8.4)
RBC # BLD AUTO: 4.18 M/UL (ref 4–5.4)
SODIUM SERPL-SCNC: 134 MMOL/L (ref 136–145)
TRIGL SERPL-MCNC: 95 MG/DL (ref 30–150)
WBC # BLD AUTO: 6.3 K/UL (ref 3.9–12.7)

## 2023-05-22 PROCEDURE — 80053 COMPREHEN METABOLIC PANEL: CPT | Performed by: INTERNAL MEDICINE

## 2023-05-22 PROCEDURE — 85025 COMPLETE CBC W/AUTO DIFF WBC: CPT | Performed by: INTERNAL MEDICINE

## 2023-05-22 PROCEDURE — 36415 COLL VENOUS BLD VENIPUNCTURE: CPT | Mod: PO | Performed by: INTERNAL MEDICINE

## 2023-05-22 PROCEDURE — 80061 LIPID PANEL: CPT | Performed by: INTERNAL MEDICINE

## 2023-06-02 ENCOUNTER — OFFICE VISIT (OUTPATIENT)
Dept: CARDIOLOGY | Facility: CLINIC | Age: 73
End: 2023-06-02
Payer: MEDICARE

## 2023-06-02 VITALS
WEIGHT: 192 LBS | DIASTOLIC BLOOD PRESSURE: 58 MMHG | HEIGHT: 64 IN | HEART RATE: 70 BPM | BODY MASS INDEX: 32.78 KG/M2 | SYSTOLIC BLOOD PRESSURE: 120 MMHG

## 2023-06-02 DIAGNOSIS — E78.00 HYPERCHOLESTEROLEMIA: Chronic | ICD-10-CM

## 2023-06-02 DIAGNOSIS — I77.9 MILD CAROTID ARTERY DISEASE: Chronic | ICD-10-CM

## 2023-06-02 DIAGNOSIS — I95.1 ORTHOSTASIS: ICD-10-CM

## 2023-06-02 DIAGNOSIS — I34.0 NON-RHEUMATIC MITRAL REGURGITATION: Chronic | ICD-10-CM

## 2023-06-02 DIAGNOSIS — E66.9 OBESITY, CLASS I, BMI 30-34.9: Chronic | ICD-10-CM

## 2023-06-02 DIAGNOSIS — I10 HTN (HYPERTENSION), BENIGN: Chronic | ICD-10-CM

## 2023-06-02 DIAGNOSIS — I25.118 CORONARY ARTERY DISEASE INVOLVING NATIVE CORONARY ARTERY OF NATIVE HEART WITH OTHER FORM OF ANGINA PECTORIS: Primary | Chronic | ICD-10-CM

## 2023-06-02 PROBLEM — E66.01 SEVERE OBESITY (BMI 35.0-39.9) WITH COMORBIDITY: Status: RESOLVED | Noted: 2023-06-02 | Resolved: 2023-06-02

## 2023-06-02 PROBLEM — E66.01 SEVERE OBESITY (BMI 35.0-39.9) WITH COMORBIDITY: Status: ACTIVE | Noted: 2023-06-02

## 2023-06-02 PROCEDURE — 3074F SYST BP LT 130 MM HG: CPT | Mod: CPTII,S$GLB,, | Performed by: INTERNAL MEDICINE

## 2023-06-02 PROCEDURE — 4010F PR ACE/ARB THEARPY RXD/TAKEN: ICD-10-PCS | Mod: CPTII,S$GLB,, | Performed by: INTERNAL MEDICINE

## 2023-06-02 PROCEDURE — 3008F PR BODY MASS INDEX (BMI) DOCUMENTED: ICD-10-PCS | Mod: CPTII,S$GLB,, | Performed by: INTERNAL MEDICINE

## 2023-06-02 PROCEDURE — 99999 PR PBB SHADOW E&M-EST. PATIENT-LVL III: CPT | Mod: PBBFAC,,, | Performed by: INTERNAL MEDICINE

## 2023-06-02 PROCEDURE — 3078F PR MOST RECENT DIASTOLIC BLOOD PRESSURE < 80 MM HG: ICD-10-PCS | Mod: CPTII,S$GLB,, | Performed by: INTERNAL MEDICINE

## 2023-06-02 PROCEDURE — 1126F AMNT PAIN NOTED NONE PRSNT: CPT | Mod: CPTII,S$GLB,, | Performed by: INTERNAL MEDICINE

## 2023-06-02 PROCEDURE — 3288F FALL RISK ASSESSMENT DOCD: CPT | Mod: CPTII,S$GLB,, | Performed by: INTERNAL MEDICINE

## 2023-06-02 PROCEDURE — 4010F ACE/ARB THERAPY RXD/TAKEN: CPT | Mod: CPTII,S$GLB,, | Performed by: INTERNAL MEDICINE

## 2023-06-02 PROCEDURE — 1126F PR PAIN SEVERITY QUANTIFIED, NO PAIN PRESENT: ICD-10-PCS | Mod: CPTII,S$GLB,, | Performed by: INTERNAL MEDICINE

## 2023-06-02 PROCEDURE — 99214 OFFICE O/P EST MOD 30 MIN: CPT | Mod: S$GLB,,, | Performed by: INTERNAL MEDICINE

## 2023-06-02 PROCEDURE — 3074F PR MOST RECENT SYSTOLIC BLOOD PRESSURE < 130 MM HG: ICD-10-PCS | Mod: CPTII,S$GLB,, | Performed by: INTERNAL MEDICINE

## 2023-06-02 PROCEDURE — 3078F DIAST BP <80 MM HG: CPT | Mod: CPTII,S$GLB,, | Performed by: INTERNAL MEDICINE

## 2023-06-02 PROCEDURE — 1100F PTFALLS ASSESS-DOCD GE2>/YR: CPT | Mod: CPTII,S$GLB,, | Performed by: INTERNAL MEDICINE

## 2023-06-02 PROCEDURE — 1100F PR PT FALLS ASSESS DOC 2+ FALLS/FALL W/INJURY/YR: ICD-10-PCS | Mod: CPTII,S$GLB,, | Performed by: INTERNAL MEDICINE

## 2023-06-02 PROCEDURE — 3288F PR FALLS RISK ASSESSMENT DOCUMENTED: ICD-10-PCS | Mod: CPTII,S$GLB,, | Performed by: INTERNAL MEDICINE

## 2023-06-02 PROCEDURE — 99999 PR PBB SHADOW E&M-EST. PATIENT-LVL III: ICD-10-PCS | Mod: PBBFAC,,, | Performed by: INTERNAL MEDICINE

## 2023-06-02 PROCEDURE — 99214 PR OFFICE/OUTPT VISIT, EST, LEVL IV, 30-39 MIN: ICD-10-PCS | Mod: S$GLB,,, | Performed by: INTERNAL MEDICINE

## 2023-06-02 PROCEDURE — 3008F BODY MASS INDEX DOCD: CPT | Mod: CPTII,S$GLB,, | Performed by: INTERNAL MEDICINE

## 2023-06-02 RX ORDER — VERAPAMIL HYDROCHLORIDE 120 MG/1
120 CAPSULE, EXTENDED RELEASE ORAL DAILY
Qty: 90 CAPSULE | Refills: 1 | Status: SHIPPED | OUTPATIENT
Start: 2023-06-02 | End: 2023-11-21

## 2023-06-02 NOTE — PROGRESS NOTES
Subjective:    Patient ID:  Breanne Gregory is a 72 y.o. female who presents for Coronary Artery Disease        HPI  DISCUSSED LABS AND GOALS CMP OK LDL 54 HDL 64, TRIGLYCERIDE 95, HEMOGLOBIN 11.8, , DOING WELL, BP MOSTLY OK, SOMETIMES LOW,SEE ROS    Past Medical History:   Diagnosis Date    Arthritis     Asthma, chronic     Combined hyperlipidemia associated with type 2 diabetes mellitus     DM (diabetes mellitus)     Gastroparesis     HTN (hypertension), benign     Hypercholesteremia     Mild carotid artery disease 2021    Unspecified disorder of kidney and ureter     kidney stones     Past Surgical History:   Procedure Laterality Date    APPENDECTOMY      ARTHROSCOPIC REPAIR OF ROTATOR CUFF OF SHOULDER Left 2021    Procedure: REPAIR, ROTATOR CUFF, ARTHROSCOPIC;  Surgeon: Priyank Thomas II, MD;  Location: [facility] OR;  Service: Orthopedics;  Laterality: Left;  possible graft agumentation    ARTHROSCOPY OF SHOULDER WITH DECOMPRESSION OF SUBACROMIAL SPACE Left 2021    Procedure: ARTHROSCOPY, SHOULDER, WITH SUBACROMIAL SPACE DECOMPRESSION;  Surgeon: Priyank Thomas II, MD;  Location: [facility] OR;  Service: Orthopedics;  Laterality: Left;    ARTHROSCOPY OF SHOULDER WITH REMOVAL OF DISTAL CLAVICLE Left 2021    Procedure: ARTHROSCOPY, SHOULDER, WITH DISTAL CLAVICLE EXCISION;  Surgeon: Priyank Thomas II, MD;  Location: [facility] OR;  Service: Orthopedics;  Laterality: Left;    CARDIAC CATHETERIZATION  2006    CARPAL TUNNEL RELEASE Left 2013    Left CTR    CARPAL TUNNEL RELEASE Left 2007    Left CTR    CARPAL TUNNEL RELEASE Right 2007    Right CTR     SECTION      COLONOSCOPY      MIGUEL    COLONOSCOPY W/ BIOPSIES  2011    precancer, repeat  Miguel    CORONARY ANGIOGRAPHY N/A 1/10/2019    Procedure: ANGIOGRAM, CORONARY ARTERY;  Surgeon: Sharmaine Grigsby MD;  Location: [facility] CATH;  Service: Cardiology;  Laterality: N/A;    ELBOW SURGERY Left 2013    Left Ulnar Nerve  Transposition    FIXATION OF TENDON Left 6/30/2021    Procedure: FIXATION, TENDON - open biceps tenodesis;  Surgeon: Priyank Thomas II, MD;  Location: [facility] OR;  Service: Orthopedics;  Laterality: Left;    JOINT REPLACEMENT Right 06/30/2015    Right TKA    JOINT REPLACEMENT Left 08/25/2008    Left TKA    LEFT HEART CATHETERIZATION Left 1/10/2019    Procedure: Left heart cath;  Surgeon: Sharmaine Grigsby MD;  Location: [facility] CATH;  Service: Cardiology;  Laterality: Left;    lip surgery      skin cancer    PARATHYROID GLAND SURGERY      REVERSE TOTAL SHOULDER ARTHROPLASTY Left 11/24/2021    Procedure: ARTHROPLASTY, SHOULDER, TOTAL, REVERSE;  Surgeon: Priyank Thomas II, MD;  Location: [facility] OR;  Service: Orthopedics;  Laterality: Left;    SHOULDER ARTHROSCOPY Right 06/15/2004    Right Shoulder Scope    TONSILLECTOMY      TOTAL ABDOMINAL HYSTERECTOMY  age 49    ovaries remain done for benign reasons    TOTAL KNEE ARTHROPLASTY  2008    tvt       Family History   Problem Relation Age of Onset    Asthma Mother     Heart disease Father     Heart attack Son      Social History     Socioeconomic History    Marital status:    Tobacco Use    Smoking status: Never    Smokeless tobacco: Never   Substance and Sexual Activity    Alcohol use: Yes     Comment: ocassioanlly    Drug use: No    Sexual activity: Yes     Partners: Male     Birth control/protection: Post-menopausal, Surgical       Review of patient's allergies indicates:  No Known Allergies    Current Outpatient Medications:     acetaminophen (TYLENOL) 325 MG tablet, Take 2 tablets (650 mg total) by mouth every 6 (six) hours., Disp: 56 tablet, Rfl: 0    acyclovir (ZOVIRAX) 400 MG tablet, TAKE ONE TABLET BY MOUTH THREE TIMES DAILY FOR 5-7 DAYS FEVER BLISTERS, Disp: , Rfl:     ALBUTEROL INHL, Inhale 2 puffs into the lungs daily as needed., Disp: , Rfl:     ascorbic acid, vitamin C, (VITAMIN C) 500 MG tablet, Take 500 mg by mouth once daily., Disp: , Rfl:     aspirin  81 MG Chew, Take 81 mg by mouth every evening., Disp: , Rfl:     biotin 1 mg Cap, Take 1 mg by mouth once daily., Disp: , Rfl:     cetirizine (ZYRTEC) 10 MG tablet, Take 10 mg by mouth once daily., Disp: , Rfl:     ciprofloxacin-dexAMETHasone 0.3-0.1% (CIPRODEX) 0.3-0.1 % DrpS, SMARTSI Drop(s) Right Ear Twice Daily, Disp: , Rfl:     clotrimazole-betamethasone 1-0.05% (LOTRISONE) cream, Apply topically daily as needed., Disp: , Rfl:     colestipoL (COLESTID) 1 gram Tab, Take 1 g by mouth 2 (two) times daily., Disp: , Rfl:     cyclobenzaprine (FLEXERIL) 10 MG tablet, Take 1 tablet (10 mg total) by mouth every 12 (twelve) hours as needed for Muscle spasms., Disp: 40 tablet, Rfl: 0    docusate sodium 100 mg capsule, Take 100 mg by mouth 2 (two) times daily., Disp: 30 tablet, Rfl: 0    fluticasone furoate-vilanteroL (BREO) 100-25 mcg/dose diskus inhaler, Inhale 1 puff into the lungs nightly as needed. , Disp: , Rfl:     fluticasone propionate (FLONASE) 50 mcg/actuation nasal spray, 2 sprays by Nasal route daily as needed. , Disp: , Rfl:     glimepiride (AMARYL) 4 MG tablet, Take 1 tablet by mouth 2 (two) times daily. , Disp: , Rfl: 0    guaiFENesin-codeine 100-10 mg/5 ml (TUSSI-ORGANIDIN NR)  mg/5 mL syrup, Take by mouth., Disp: , Rfl:     hydrOXYchloroQUINE (PLAQUENIL) 200 mg tablet, Take 1 tablet (200 mg total) by mouth 2 (two) times daily., Disp: 60 tablet, Rfl: 6    hyoscyamine (LEVSIN/SL) 0.125 mg Subl, Place 1 tablet under the tongue every 6 (six) hours as needed. , Disp: , Rfl: 0    ibuprofen (ADVIL,MOTRIN) 400 MG tablet, Take 1 tablet (400 mg total) by mouth every 6 (six) hours as needed., Disp: 30 tablet, Rfl: 1    latanoprost 0.005 % ophthalmic solution, Place 1 drop into both eyes nightly., Disp: , Rfl:     losartan (COZAAR) 25 MG tablet, Take 1 tablet (25 mg total) by mouth once daily., Disp: 90 tablet, Rfl: 3    multivitamin capsule, Take 1 capsule by mouth once daily., Disp: , Rfl:     omeprazole  (PRILOSEC) 40 MG capsule, Take 40 mg by mouth every morning. , Disp: , Rfl:     oxyCODONE-acetaminophen (PERCOCET) 7.5-325 mg per tablet, Take 1 tablet by mouth every 12 (twelve) hours as needed for Pain., Disp: 30 tablet, Rfl: 0    PROAIR HFA 90 mcg/actuation inhaler, Inhale 1 puff into the lungs every 6 (six) hours as needed. , Disp: , Rfl: 0    rosuvastatin (CRESTOR) 20 MG tablet, Take 1 tablet (20 mg total) by mouth every evening., Disp: 90 tablet, Rfl: 1    semaglutide (OZEMPIC) 0.25 mg or 0.5 mg(2 mg/1.5 mL) pen injector, Inject into the skin once a week. sundays, Disp: , Rfl:     sucralfate (CARAFATE) 1 gram tablet, Take 1 g by mouth 2 (two) times daily. , Disp: , Rfl:     triamterene-hydrochlorothiazide 37.5-25 mg (DYAZIDE) 37.5-25 mg per capsule, TAKE 1 CAPSULE BY MOUTH  EVERY OTHER DAY, Disp: 45 capsule, Rfl: 3    vitamin D 1000 units Tab, Take 185 mg by mouth once daily., Disp: , Rfl:     nitroGLYCERIN (NITROSTAT) 0.4 MG SL tablet, Place 1 tablet (0.4 mg total) under the tongue every 5 (five) minutes as needed., Disp: 25 tablet, Rfl: 0    verapamiL (VERELAN) 120 MG C24P, Take 1 capsule (120 mg total) by mouth once daily., Disp: 90 capsule, Rfl: 1    Review of Systems   Constitutional: Negative for chills, diaphoresis, fever, malaise/fatigue, night sweats and weight loss.   HENT:  Negative for congestion and sore throat.    Eyes:  Negative for blurred vision (CATARCT) and visual disturbance.   Cardiovascular:  Negative for chest pain, claudication, cyanosis, dyspnea on exertion (MILD), irregular heartbeat, leg swelling, near-syncope, orthopnea, palpitations, paroxysmal nocturnal dyspnea and syncope.   Respiratory:  Negative for cough, hemoptysis, shortness of breath and wheezing.    Hematologic/Lymphatic: Negative for adenopathy. Does not bruise/bleed easily.   Skin:  Negative for color change and rash.   Musculoskeletal:  Positive for falls (LEG BOTHERING HER) and joint pain (L SHOULDER). Neck pain: ONCE  "POSITIONAL.  Gastrointestinal:  Negative for abdominal pain, change in bowel habit, dysphagia, jaundice, melena and nausea.   Genitourinary:  Negative for dysuria and flank pain.   Neurological:  Positive for loss of balance (SOME). Negative for brief paralysis, focal weakness, light-headedness, paresthesias and weakness. Dizziness: OCC ORTHOSTASIS.  Psychiatric/Behavioral:  Negative for altered mental status and depression.    Allergic/Immunologic: Negative for persistent infections.      Objective:      Vitals:    06/02/23 0835 06/02/23 0851   BP: (!) 162/72 (!) 120/58   Pulse: 70    Weight: 87.1 kg (192 lb 0.3 oz)    Height: 5' 4" (1.626 m)    PainSc: 0-No pain      Body mass index is 32.96 kg/m².    Physical Exam  Constitutional:       Appearance: She is well-developed. She is obese.   HENT:      Head: Normocephalic and atraumatic.   Eyes:      General: No scleral icterus.     Extraocular Movements: Extraocular movements intact.      Pupils: Pupils are equal, round, and reactive to light.   Neck:      Vascular: Normal carotid pulses. No JVD.   Cardiovascular:      Rate and Rhythm: Normal rate and regular rhythm. No extrasystoles are present.     Pulses:           Carotid pulses are 2+ on the right side and 2+ on the left side.       Radial pulses are 2+ on the right side and 2+ on the left side.        Femoral pulses are 2+ on the right side and 2+ on the left side.       Posterior tibial pulses are 2+ on the right side and 2+ on the left side.      Heart sounds: Murmur heard.   Systolic murmur is present with a grade of 1/6.     No friction rub. No gallop.   Pulmonary:      Effort: Pulmonary effort is normal.      Breath sounds: Normal breath sounds. No rales.   Abdominal:      Palpations: Abdomen is soft. There is no hepatomegaly.      Tenderness: There is no abdominal tenderness.   Musculoskeletal:         General: Normal range of motion.      Cervical back: Neck supple.      Right lower leg: No edema.      " Left lower leg: No edema.      Comments: VARICOSE VEINS   Skin:     General: Skin is warm and dry.      Capillary Refill: Capillary refill takes less than 2 seconds.   Neurological:      General: No focal deficit present.      Mental Status: She is alert and oriented to person, place, and time.   Psychiatric:         Attention and Perception: Attention normal.         Mood and Affect: Mood normal.         Speech: Speech normal.         Behavior: Behavior normal.               ..    Chemistry        Component Value Date/Time     (L) 05/22/2023 0711     06/16/2015 0950    K 4.0 05/22/2023 0711    K 4.3 06/16/2015 0950     05/22/2023 0711     06/16/2015 0950    CO2 25 05/22/2023 0711    BUN 16 05/22/2023 0711    CREATININE 0.9 05/22/2023 0711    CREATININE 1.04 06/16/2015 0950    GLU 77 05/22/2023 0711        Component Value Date/Time    CALCIUM 9.7 05/22/2023 0711    ALKPHOS 78 05/22/2023 0711    AST 22 05/22/2023 0711    ALT 17 05/22/2023 0711    BILITOT 0.5 05/22/2023 0711    ESTGFRAFRICA >60.0 05/16/2022 0823    EGFRNONAA >60.0 05/16/2022 0823            ..  Lab Results   Component Value Date    CHOL 137 05/22/2023    CHOL 133 05/16/2022    CHOL 149 04/05/2019     Lab Results   Component Value Date    HDL 64 05/22/2023    HDL 55 05/16/2022    HDL 65 04/05/2019     Lab Results   Component Value Date    LDLCALC 54.0 (L) 05/22/2023    LDLCALC 61.4 (L) 05/16/2022    LDLCALC 63.0 04/05/2019     Lab Results   Component Value Date    TRIG 95 05/22/2023    TRIG 83 05/16/2022    TRIG 105 04/05/2019     Lab Results   Component Value Date    CHOLHDL 46.7 05/22/2023    CHOLHDL 41.4 05/16/2022    CHOLHDL 43.6 04/05/2019     ..  Lab Results   Component Value Date    WBC 6.30 05/22/2023    HGB 11.8 (L) 05/22/2023    HCT 35.7 (L) 05/22/2023    MCV 85 05/22/2023     (L) 05/22/2023       Test(s) Reviewed  I have reviewed the following in detail:  [] Stress test   [] Angiography   [] Echocardiogram    [x] Labs   [] Other:       Assessment:         ICD-10-CM ICD-9-CM   1. Coronary artery disease involving native coronary artery of native heart with other form of angina pectoris  I25.118 414.01     413.9   2. Mild carotid artery disease  I77.9 447.9   3. Orthostasis  I95.1 458.0   4. Non-rheumatic mitral regurgitation  I34.0 424.0   5. Obesity, Class I, BMI 30-34.9  E66.9 278.00   6. HTN (hypertension), benign  I10 401.1   7. Hypercholesterolemia  E78.00 272.0     Problem List Items Addressed This Visit          Cardiac/Vascular    Hypercholesterolemia    Relevant Orders    Comprehensive Metabolic Panel    HTN (hypertension), benign    Relevant Orders    Comprehensive Metabolic Panel    Non-rheumatic mitral regurgitation    Coronary artery disease involving native coronary artery of native heart with other form of angina pectoris - Primary    Relevant Orders    Comprehensive Metabolic Panel    Mild carotid artery disease    Orthostasis       Endocrine    Obesity, Class I, BMI 30-34.9        Plan:     DECREASE VERAPAMIL  MG, WATCH BP, HYDRATION,ALL OTHER  CV CLINICALLY STABLE, CLASS 1 ANGINA, NO HF, NO TIA, NO CLINICAL ARRHYTHMIA,CONTINUE CURRENT MEDS, EDUCATION, DIET, EXERCISE, WEIGHT LOSS RETURN TO CLINIC IN 6 MONTHS WITH LABS, DISCUSSED PLAN WITH THE PATIENT AND HER         Coronary artery disease involving native coronary artery of native heart with other form of angina pectoris  -     Comprehensive Metabolic Panel; Future; Expected date: 12/02/2023    Mild carotid artery disease    Orthostasis  Comments:  DECREASE MEDS FURTHER    Non-rheumatic mitral regurgitation    Obesity, Class I, BMI 30-34.9    HTN (hypertension), benign  -     Comprehensive Metabolic Panel; Future; Expected date: 12/02/2023    Hypercholesterolemia  Comments:  GOOD  Orders:  -     Comprehensive Metabolic Panel; Future; Expected date: 12/02/2023    Other orders  -     verapamiL (VERELAN) 120 MG C24P; Take 1 capsule (120 mg  total) by mouth once daily.  Dispense: 90 capsule; Refill: 1    RTC Low level/low impact aerobic exercise 5x's/wk. Heart healthy diet and risk factor modification.    See labs and med orders.    Aerobic exercise 5x's/wk. Heart healthy diet and risk factor modification.    See labs and med orders.

## 2023-07-20 RX ORDER — ROSUVASTATIN CALCIUM 20 MG/1
TABLET, COATED ORAL
Qty: 90 TABLET | Refills: 1 | Status: SHIPPED | OUTPATIENT
Start: 2023-07-20 | End: 2024-01-18

## 2023-11-21 RX ORDER — VERAPAMIL HYDROCHLORIDE 120 MG/1
120 CAPSULE, EXTENDED RELEASE ORAL
Qty: 90 CAPSULE | Refills: 1 | Status: SHIPPED | OUTPATIENT
Start: 2023-11-21 | End: 2024-03-18 | Stop reason: SDUPTHER

## 2023-12-19 DIAGNOSIS — I25.118 CORONARY ARTERY DISEASE INVOLVING NATIVE CORONARY ARTERY OF NATIVE HEART WITH OTHER FORM OF ANGINA PECTORIS: Primary | ICD-10-CM

## 2023-12-19 DIAGNOSIS — I10 ESSENTIAL HYPERTENSION: ICD-10-CM

## 2023-12-19 DIAGNOSIS — I10 HTN (HYPERTENSION), BENIGN: ICD-10-CM

## 2023-12-19 RX ORDER — LOSARTAN POTASSIUM 25 MG/1
25 TABLET ORAL
Qty: 90 TABLET | Refills: 1 | Status: SHIPPED | OUTPATIENT
Start: 2023-12-19

## 2023-12-19 NOTE — TELEPHONE ENCOUNTER
----- Message from Milvia Wilcox sent at 12/19/2023  2:04 PM CST -----  Regarding: refill  Contact: Breanne [phone]  Type:  RX Refill Request    Who Called:  Breanne    Refill or New Rx:  refill    RX Name and Strength:  losartan (COZAAR) 25 MG tablet 90 tablet 3 11/18/2022 11/18/2023   Sig - Route: Take 1 tablet (25 mg total) by mouth once daily. - Oral   Sent to pharmacy as: losartan (COZAAR) 25 MG tablet   Notes to Pharmacy: .   E-Prescribing Status: Receipt confirmed by pharmacy (11/18/2022  8:41 AM CST)       How is the patient currently taking it? (ex. 1XDay):  see above    Is this a 30 day or 90 day RX:  see above    Preferred Pharmacy with phone number:      [facility] [place] - [id number] [address] [facility] [address] [facility] [address]  [id number] [address]  [facility] [address]  Phone: [phone] Fax: [phone]        Local or Mail Order:  local    Ordering Provider:  Calista Childs Call Back Number:  [phone]    Additional Information:

## 2024-01-18 DIAGNOSIS — E78.00 HYPERCHOLESTEROLEMIA: Primary | ICD-10-CM

## 2024-01-18 RX ORDER — ROSUVASTATIN CALCIUM 20 MG/1
TABLET, COATED ORAL
Qty: 90 TABLET | Refills: 1 | Status: SHIPPED | OUTPATIENT
Start: 2024-01-18 | End: 2024-04-29

## 2024-03-18 DIAGNOSIS — I10 ESSENTIAL HYPERTENSION: Primary | ICD-10-CM

## 2024-03-18 RX ORDER — VERAPAMIL HYDROCHLORIDE 120 MG/1
120 CAPSULE, EXTENDED RELEASE ORAL DAILY
Qty: 90 CAPSULE | Refills: 1 | Status: SHIPPED | OUTPATIENT
Start: 2024-03-18

## 2024-04-25 DIAGNOSIS — E78.00 HYPERCHOLESTEROLEMIA: ICD-10-CM

## 2024-04-29 RX ORDER — ROSUVASTATIN CALCIUM 20 MG/1
TABLET, COATED ORAL
Qty: 90 TABLET | Refills: 1 | Status: SHIPPED | OUTPATIENT
Start: 2024-04-29

## 2024-05-07 ENCOUNTER — TELEPHONE (OUTPATIENT)
Dept: PODIATRY | Facility: CLINIC | Age: 74
End: 2024-05-07
Payer: MEDICARE

## 2024-05-07 NOTE — TELEPHONE ENCOUNTER
----- Message from Ann Blas sent at 5/7/2024  8:58 AM CDT -----  Contact: self  Type:  Needs Medical Advice    Who Called: self  Symptoms (please be specific): needs to speak to nurse regarding a payment and why she has to pay $180.82. I did inform the pt her ins was not in and I had to input in. Pt wants to still talk to office.    Would the patient rather a call back or a response via [facility]? call  Best Call Back Number: [phone] (home)     Additional Information: please advise and thank you.

## 2024-05-13 ENCOUNTER — HOSPITAL ENCOUNTER (OUTPATIENT)
Dept: RADIOLOGY | Facility: HOSPITAL | Age: 74
Discharge: HOME | End: 2024-05-13
Attending: PODIATRIST
Payer: MEDICARE

## 2024-05-13 ENCOUNTER — OFFICE VISIT (OUTPATIENT)
Dept: PODIATRY | Facility: CLINIC | Age: 74
End: 2024-05-13
Payer: MEDICARE

## 2024-05-13 ENCOUNTER — TELEPHONE (OUTPATIENT)
Dept: PODIATRY | Facility: CLINIC | Age: 74
End: 2024-05-13

## 2024-05-13 VITALS — BODY MASS INDEX: 35.27 KG/M2 | WEIGHT: 199.06 LBS | HEIGHT: 63 IN

## 2024-05-13 DIAGNOSIS — M76.821 POSTERIOR TIBIAL TENDON DYSFUNCTION (PTTD) OF RIGHT LOWER EXTREMITY: ICD-10-CM

## 2024-05-13 DIAGNOSIS — M76.821 POSTERIOR TIBIAL TENDON DYSFUNCTION (PTTD) OF RIGHT LOWER EXTREMITY: Primary | ICD-10-CM

## 2024-05-13 DIAGNOSIS — L84 CORN OR CALLUS: ICD-10-CM

## 2024-05-13 PROCEDURE — 1160F RVW MEDS BY RX/DR IN RCRD: CPT | Mod: CPTII,S$GLB,, | Performed by: PODIATRIST

## 2024-05-13 PROCEDURE — 4010F ACE/ARB THERAPY RXD/TAKEN: CPT | Mod: CPTII,S$GLB,, | Performed by: PODIATRIST

## 2024-05-13 PROCEDURE — 1125F AMNT PAIN NOTED PAIN PRSNT: CPT | Mod: CPTII,S$GLB,, | Performed by: PODIATRIST

## 2024-05-13 PROCEDURE — 73630 X-RAY EXAM OF FOOT: CPT | Mod: 26,RT,, | Performed by: RADIOLOGY

## 2024-05-13 PROCEDURE — 99999 PR PBB SHADOW E&M-EST. PATIENT-LVL V: CPT | Mod: PBBFAC,,, | Performed by: PODIATRIST

## 2024-05-13 PROCEDURE — 99204 OFFICE O/P NEW MOD 45 MIN: CPT | Mod: S$GLB,,, | Performed by: PODIATRIST

## 2024-05-13 PROCEDURE — 1101F PT FALLS ASSESS-DOCD LE1/YR: CPT | Mod: CPTII,S$GLB,, | Performed by: PODIATRIST

## 2024-05-13 PROCEDURE — 3288F FALL RISK ASSESSMENT DOCD: CPT | Mod: CPTII,S$GLB,, | Performed by: PODIATRIST

## 2024-05-13 PROCEDURE — 1159F MED LIST DOCD IN RCRD: CPT | Mod: CPTII,S$GLB,, | Performed by: PODIATRIST

## 2024-05-13 PROCEDURE — 73630 X-RAY EXAM OF FOOT: CPT | Mod: TC,PO,RT

## 2024-05-13 RX ORDER — SEMAGLUTIDE 0.68 MG/ML
INJECTION, SOLUTION SUBCUTANEOUS
COMMUNITY

## 2024-05-13 NOTE — PROGRESS NOTES
Subjective:      Patient ID: Breanne Gregory is a 73 y.o. female.    Chief Complaint: Ganglion Cyst (right) and Callouses    Breanne is a 73 y.o. female who presents to the podiatry clinic  with complaint of  right foot pain. Onset of the symptoms was several weeks ago. Precipitating event: none known. Current symptoms include: ability to bear weight, but with some pain and worsening symptoms after a period of activity. Aggravating factors: any weight bearing. Symptoms have gradually worsened. Patient has had no prior foot problems. Evaluation to date: none. Treatment to date: none. Patients rates pain 6/10 on pain scale.    Review of Systems   Constitutional: Negative for chills and fever.   Cardiovascular:  Negative for claudication and leg swelling.   Respiratory:  Negative for shortness of breath.    Skin:  Negative for itching, nail changes and rash.   Musculoskeletal:  Negative for muscle cramps, muscle weakness and myalgias.        Right foot pain   Gastrointestinal:  Negative for nausea and vomiting.   Neurological:  Negative for focal weakness, loss of balance, numbness and paresthesias.           Objective:      Physical Exam  Constitutional:       General: She is not in acute distress.     Appearance: She is well-developed. She is not diaphoretic.   Cardiovascular:      Pulses:           Dorsalis pedis pulses are 2+ on the right side and 2+ on the left side.        Posterior tibial pulses are 2+ on the right side and 2+ on the left side.      Comments: < 3 sec capillary refill time to toes 1-5 bilateral. Toes and feet are warm to touch proximally with normal distal cooling b/l. There is some hair growth on the feet and toes b/l. There is no edema b/l. No spider veins or varicosities present b/l.     Musculoskeletal:      Comments: Equinus noted b/l ankles with < 10 deg DF noted. MMT 5/5 in DF/PF/Inv/Ev resistance with no reproduction of pain in any direction. Passive range of motion of ankle and pedal  joints is painless b/l.    Medial arch collapse with weight bearing more to the right, there is pain with palpation at the navicular tuberosity and distal PT tendon.    Skin:     General: Skin is warm and dry.      Coloration: Skin is not pale.      Findings: No abrasion, bruising, burn, ecchymosis, erythema, laceration, lesion, petechiae or rash.      Nails: There is no clubbing.      Comments: Skin temperature, texture and turgor within normal limits.   Neurological:      Mental Status: She is alert and oriented to person, place, and time.      Sensory: No sensory deficit.      Motor: No tremor, atrophy or abnormal muscle tone.      Comments: Negative tinel sign bilateral.   Psychiatric:         Behavior: Behavior normal.               Assessment:       Encounter Diagnoses   Name Primary?    Posterior tibial tendon dysfunction (PTTD) of right lower extremity Yes    Corn or callus          Plan:       Breanne was seen today for ganglion cyst and callouses.    Diagnoses and all orders for this visit:    Posterior tibial tendon dysfunction (PTTD) of right lower extremity  -     X-Ray Foot Complete Right; Future  -     AIR CAST WALKER BOOT FOR HOME USE    Corn or callus  -     X-Ray Foot Complete Right; Future  -     AIR CAST WALKER BOOT FOR HOME USE      I counseled the patient on her conditions, their implications and medical management.    X-rays to be done to assess further    Dispensed, fitted and gait trained with orthopedic boot right foot    Ambulate in the boot only\    Start working on getting OTC arch supports I recommended and bring them to the next visit in 3-4 weeks    Return for follow up in 3-4 weeks    Dean Edouard DPM

## 2024-05-13 NOTE — PROGRESS NOTES
Significant arthritis and bone spurs are noted in the area of the pain, treatment remains the same offload in the boot and work into a good supportive orthotic insert follow up at appointed time

## 2024-05-13 NOTE — TELEPHONE ENCOUNTER
----- Message from Dean Edouard DPM sent at 5/13/2024 12:47 PM CDT -----  Significant arthritis and bone spurs are noted in the area of the pain, treatment remains the same offload in the boot and work into a good supportive orthotic insert follow up at appointed time

## 2024-05-15 ENCOUNTER — TELEPHONE (OUTPATIENT)
Dept: PODIATRY | Facility: CLINIC | Age: 74
End: 2024-05-15
Payer: MEDICARE

## 2024-05-15 NOTE — TELEPHONE ENCOUNTER
----- Message from Jalyn Mccabe sent at 5/15/2024  2:30 PM CDT -----  Contact: self  Pt is req a call back from Lili she has some questions about what she called a foot brace that was given to her.  Call back at [phone] and thanks

## 2024-06-06 DIAGNOSIS — I25.118 CORONARY ARTERY DISEASE INVOLVING NATIVE CORONARY ARTERY OF NATIVE HEART WITH OTHER FORM OF ANGINA PECTORIS: ICD-10-CM

## 2024-06-06 DIAGNOSIS — I10 HTN (HYPERTENSION), BENIGN: ICD-10-CM

## 2024-06-06 DIAGNOSIS — I10 ESSENTIAL HYPERTENSION: ICD-10-CM

## 2024-06-06 RX ORDER — LOSARTAN POTASSIUM 25 MG/1
25 TABLET ORAL
Qty: 90 TABLET | Refills: 1 | Status: SHIPPED | OUTPATIENT
Start: 2024-06-06

## 2024-06-14 ENCOUNTER — TELEPHONE (OUTPATIENT)
Dept: CARDIOLOGY | Facility: CLINIC | Age: 74
End: 2024-06-14
Payer: MEDICARE

## 2024-06-14 DIAGNOSIS — R60.9 EDEMA, UNSPECIFIED TYPE: Primary | ICD-10-CM

## 2024-06-14 NOTE — TELEPHONE ENCOUNTER
----- Message from Donald Campa sent at 6/14/2024  4:28 PM CDT -----  Type:  Needs Medical Advice    Who Called: pt   Symptoms (please be specific): ankles/ feet swollen twice the size in pain  How long has patient had these symptoms:  a few days   Would the patient rather a call back or a response via [facility]? Call   Best Call Back Number:  [phone]  Additional Information:

## 2024-06-14 NOTE — TELEPHONE ENCOUNTER
Spoke to pt: advised to hold verapamil. Take extra dyazide for a couple of days per Dr Grigsby. Scheduled venous doppler of lower extremities per Dr Grigsby

## 2024-06-19 ENCOUNTER — LAB VISIT (OUTPATIENT)
Dept: LAB | Facility: HOSPITAL | Age: 74
End: 2024-06-19
Payer: MEDICARE

## 2024-06-19 DIAGNOSIS — I10 HTN (HYPERTENSION), BENIGN: Chronic | ICD-10-CM

## 2024-06-19 DIAGNOSIS — I25.118 CORONARY ARTERY DISEASE INVOLVING NATIVE CORONARY ARTERY OF NATIVE HEART WITH OTHER FORM OF ANGINA PECTORIS: Chronic | ICD-10-CM

## 2024-06-19 DIAGNOSIS — E78.00 HYPERCHOLESTEROLEMIA: Chronic | ICD-10-CM

## 2024-06-19 LAB
ALBUMIN SERPL BCP-MCNC: 3.6 G/DL (ref 3.5–5.2)
ALP SERPL-CCNC: 89 U/L (ref 55–135)
ALT SERPL W/O P-5'-P-CCNC: 14 U/L (ref 10–44)
ANION GAP SERPL CALC-SCNC: 8 MMOL/L (ref 8–16)
AST SERPL-CCNC: 21 U/L (ref 10–40)
BILIRUB SERPL-MCNC: 0.3 MG/DL (ref 0.1–1)
BUN SERPL-MCNC: 24 MG/DL (ref 8–23)
CALCIUM SERPL-MCNC: 9.7 MG/DL (ref 8.7–10.5)
CHLORIDE SERPL-SCNC: 103 MMOL/L (ref 95–110)
CO2 SERPL-SCNC: 23 MMOL/L (ref 23–29)
CREAT SERPL-MCNC: 1.1 MG/DL (ref 0.5–1.4)
EST. GFR  (NO RACE VARIABLE): 52.7 ML/MIN/1.73 M^2
GLUCOSE SERPL-MCNC: 143 MG/DL (ref 70–110)
POTASSIUM SERPL-SCNC: 5 MMOL/L (ref 3.5–5.1)
PROT SERPL-MCNC: 6.7 G/DL (ref 6–8.4)
SODIUM SERPL-SCNC: 134 MMOL/L (ref 136–145)

## 2024-06-19 PROCEDURE — 36415 COLL VENOUS BLD VENIPUNCTURE: CPT | Mod: PO | Performed by: INTERNAL MEDICINE

## 2024-06-19 PROCEDURE — 80053 COMPREHEN METABOLIC PANEL: CPT | Performed by: INTERNAL MEDICINE

## 2024-06-24 ENCOUNTER — OFFICE VISIT (OUTPATIENT)
Dept: PODIATRY | Facility: CLINIC | Age: 74
End: 2024-06-24
Payer: MEDICARE

## 2024-06-24 VITALS — BODY MASS INDEX: 33.98 KG/M2 | WEIGHT: 199.06 LBS | HEIGHT: 64 IN

## 2024-06-24 DIAGNOSIS — M19.071 ARTHRITIS OF RIGHT MIDFOOT: ICD-10-CM

## 2024-06-24 DIAGNOSIS — M76.821 POSTERIOR TIBIAL TENDON DYSFUNCTION (PTTD) OF RIGHT LOWER EXTREMITY: Primary | ICD-10-CM

## 2024-06-24 PROCEDURE — 99999 PR PBB SHADOW E&M-EST. PATIENT-LVL IV: CPT | Mod: PBBFAC,,, | Performed by: PODIATRIST

## 2024-06-24 PROCEDURE — 20600 DRAIN/INJ JOINT/BURSA W/O US: CPT | Mod: RT,S$GLB,, | Performed by: PODIATRIST

## 2024-06-24 PROCEDURE — 99499 UNLISTED E&M SERVICE: CPT | Mod: S$GLB,,, | Performed by: PODIATRIST

## 2024-06-24 RX ORDER — DIPHENOXYLATE HYDROCHLORIDE AND ATROPINE SULFATE 2.5; .025 MG/1; MG/1
TABLET ORAL
COMMUNITY
Start: 2024-05-21

## 2024-06-24 RX ORDER — MAG HYDROX/ALUMINUM HYD/SIMETH 200-200-20
SUSPENSION, ORAL (FINAL DOSE FORM) ORAL 2 TIMES DAILY PRN
COMMUNITY
Start: 2024-05-29

## 2024-06-24 RX ORDER — FLUCONAZOLE 150 MG/1
150 TABLET ORAL ONCE
COMMUNITY
Start: 2024-05-29

## 2024-06-24 RX ORDER — DEXAMETHASONE SODIUM PHOSPHATE 4 MG/ML
2 INJECTION, SOLUTION INTRA-ARTICULAR; INTRALESIONAL; INTRAMUSCULAR; INTRAVENOUS; SOFT TISSUE
Status: COMPLETED | OUTPATIENT
Start: 2024-06-24 | End: 2024-06-24

## 2024-06-24 RX ORDER — MUPIROCIN 20 MG/G
OINTMENT TOPICAL 3 TIMES DAILY
COMMUNITY
Start: 2024-05-21

## 2024-06-24 RX ORDER — CEPHALEXIN 500 MG/1
500 CAPSULE ORAL 3 TIMES DAILY
COMMUNITY
Start: 2024-05-22

## 2024-06-24 RX ORDER — METHYLPREDNISOLONE ACETATE 40 MG/ML
20 INJECTION, SUSPENSION INTRA-ARTICULAR; INTRALESIONAL; INTRAMUSCULAR; SOFT TISSUE
Status: COMPLETED | OUTPATIENT
Start: 2024-06-24 | End: 2024-06-24

## 2024-06-24 RX ADMIN — METHYLPREDNISOLONE ACETATE 20 MG: 40 INJECTION, SUSPENSION INTRA-ARTICULAR; INTRALESIONAL; INTRAMUSCULAR; SOFT TISSUE at 10:06

## 2024-06-24 RX ADMIN — DEXAMETHASONE SODIUM PHOSPHATE 2 MG: 4 INJECTION, SOLUTION INTRA-ARTICULAR; INTRALESIONAL; INTRAMUSCULAR; INTRAVENOUS; SOFT TISSUE at 10:06

## 2024-06-24 NOTE — PROGRESS NOTES
Subjective:      Patient ID: Breanne Gregory is a 73 y.o. female.    Chief Complaint: Foot Pain    Breanne is a 73 y.o. female who presents to the podiatry clinic  with complaint of  right foot pain. Onset of the symptoms was several weeks ago. Precipitating event: none known. Current symptoms include: ability to bear weight, but with some pain and worsening symptoms after a period of activity. Aggravating factors: any weight bearing. Symptoms have gradually worsened. Patient has had no prior foot problems. Evaluation to date: none. Treatment to date: none. Patients rates pain 6/10 on pain scale.    6/24/24: Patient returns for right foot pain that is better when wearing the boot, but when she gets into the shoes and powerstep inserts the pain returns quickly    Review of Systems   Constitutional: Negative for chills and fever.   Cardiovascular:  Negative for claudication and leg swelling.   Respiratory:  Negative for shortness of breath.    Skin:  Negative for itching, nail changes and rash.   Musculoskeletal:  Negative for muscle cramps, muscle weakness and myalgias.        Right foot pain   Gastrointestinal:  Negative for nausea and vomiting.   Neurological:  Negative for focal weakness, loss of balance, numbness and paresthesias.           Objective:      Physical Exam  Constitutional:       General: She is not in acute distress.     Appearance: She is well-developed. She is not diaphoretic.   Cardiovascular:      Pulses:           Dorsalis pedis pulses are 2+ on the right side and 2+ on the left side.        Posterior tibial pulses are 2+ on the right side and 2+ on the left side.      Comments: < 3 sec capillary refill time to toes 1-5 bilateral. Toes and feet are warm to touch proximally with normal distal cooling b/l. There is some hair growth on the feet and toes b/l. There is no edema b/l. No spider veins or varicosities present b/l.     Musculoskeletal:      Comments: Equinus noted b/l ankles with < 10  deg DF noted. MMT 5/5 in DF/PF/Inv/Ev resistance with no reproduction of pain in any direction. Passive range of motion of ankle and pedal joints is painless b/l.    Medial arch collapse with weight bearing more to the right, there is pain with palpation at the navicular tuberosity and distal PT tendon.    Skin:     General: Skin is warm and dry.      Coloration: Skin is not pale.      Findings: No abrasion, bruising, burn, ecchymosis, erythema, laceration, lesion, petechiae or rash.      Nails: There is no clubbing.      Comments: Skin temperature, texture and turgor within normal limits.   Neurological:      Mental Status: She is alert and oriented to person, place, and time.      Sensory: No sensory deficit.      Motor: No tremor, atrophy or abnormal muscle tone.      Comments: Negative tinel sign bilateral.   Psychiatric:         Behavior: Behavior normal.               Assessment:       Encounter Diagnoses   Name Primary?    Posterior tibial tendon dysfunction (PTTD) of right lower extremity Yes    Arthritis of right midfoot            Plan:       Breanne was seen today for foot pain.    Diagnoses and all orders for this visit:    Posterior tibial tendon dysfunction (PTTD) of right lower extremity    Arthritis of right midfoot        I counseled the patient on her conditions, their implications and medical management.    X-rays show significant arthritis and spurring to the dorsal NC joint right foot    A steroid injection was performed at the right dorsal NC joint using 1% plain Lidocaine and 20 mg of depo medrol and 2 mg dexamethasone. This was well tolerated.     Can still use the orthopedic boot as needed but if the foot feels better with the injection over the next 3-4 days she can try to get back into the shoes and insert she bought, the powerstep insert does seem to fit well with her arch    Consider custom orthotic inserts    Return in 6 weeks for follow up    Dean Edouard DPM

## 2024-06-25 ENCOUNTER — OFFICE VISIT (OUTPATIENT)
Dept: CARDIOLOGY | Facility: CLINIC | Age: 74
End: 2024-06-25
Payer: MEDICARE

## 2024-06-25 VITALS
DIASTOLIC BLOOD PRESSURE: 67 MMHG | SYSTOLIC BLOOD PRESSURE: 139 MMHG | WEIGHT: 199.5 LBS | BODY MASS INDEX: 34.25 KG/M2 | HEART RATE: 64 BPM

## 2024-06-25 DIAGNOSIS — I10 HTN (HYPERTENSION), BENIGN: Chronic | ICD-10-CM

## 2024-06-25 DIAGNOSIS — I25.118 CORONARY ARTERY DISEASE INVOLVING NATIVE CORONARY ARTERY OF NATIVE HEART WITH OTHER FORM OF ANGINA PECTORIS: Primary | Chronic | ICD-10-CM

## 2024-06-25 DIAGNOSIS — N18.31 STAGE 3A CHRONIC KIDNEY DISEASE: Chronic | ICD-10-CM

## 2024-06-25 DIAGNOSIS — E66.9 OBESITY, CLASS I, BMI 30-34.9: Chronic | ICD-10-CM

## 2024-06-25 DIAGNOSIS — E11.69 COMBINED HYPERLIPIDEMIA ASSOCIATED WITH TYPE 2 DIABETES MELLITUS: Chronic | ICD-10-CM

## 2024-06-25 DIAGNOSIS — E78.2 COMBINED HYPERLIPIDEMIA ASSOCIATED WITH TYPE 2 DIABETES MELLITUS: Chronic | ICD-10-CM

## 2024-06-25 DIAGNOSIS — I77.9 MILD CAROTID ARTERY DISEASE: ICD-10-CM

## 2024-06-25 PROCEDURE — 99214 OFFICE O/P EST MOD 30 MIN: CPT | Mod: S$GLB,,, | Performed by: INTERNAL MEDICINE

## 2024-06-25 PROCEDURE — 4010F ACE/ARB THERAPY RXD/TAKEN: CPT | Mod: CPTII,S$GLB,, | Performed by: INTERNAL MEDICINE

## 2024-06-25 PROCEDURE — 3008F BODY MASS INDEX DOCD: CPT | Mod: CPTII,S$GLB,, | Performed by: INTERNAL MEDICINE

## 2024-06-25 PROCEDURE — 1159F MED LIST DOCD IN RCRD: CPT | Mod: CPTII,S$GLB,, | Performed by: INTERNAL MEDICINE

## 2024-06-25 PROCEDURE — 3288F FALL RISK ASSESSMENT DOCD: CPT | Mod: CPTII,S$GLB,, | Performed by: INTERNAL MEDICINE

## 2024-06-25 PROCEDURE — 3078F DIAST BP <80 MM HG: CPT | Mod: CPTII,S$GLB,, | Performed by: INTERNAL MEDICINE

## 2024-06-25 PROCEDURE — 1126F AMNT PAIN NOTED NONE PRSNT: CPT | Mod: CPTII,S$GLB,, | Performed by: INTERNAL MEDICINE

## 2024-06-25 PROCEDURE — 1101F PT FALLS ASSESS-DOCD LE1/YR: CPT | Mod: CPTII,S$GLB,, | Performed by: INTERNAL MEDICINE

## 2024-06-25 PROCEDURE — 99999 PR PBB SHADOW E&M-EST. PATIENT-LVL IV: CPT | Mod: PBBFAC,,, | Performed by: INTERNAL MEDICINE

## 2024-06-25 PROCEDURE — 3075F SYST BP GE 130 - 139MM HG: CPT | Mod: CPTII,S$GLB,, | Performed by: INTERNAL MEDICINE

## 2024-06-25 NOTE — PROGRESS NOTES
Subjective:    Patient ID:  Breanne Gregory is a 73 y.o. female who presents for Coronary Artery Disease and Heart Problem        HPI    DISCUSSED LABS AND GOALS CMP WITH GFR OF 53, BP WAS UP,. MEDS ADJUSTED, DOING BETTER, BOOT OFF,STEROID SHOT FOOT, SEE ROS  Past Medical History:   Diagnosis Date    Arthritis     Asthma, chronic     Combined hyperlipidemia associated with type 2 diabetes mellitus     DM (diabetes mellitus)     Gastroparesis     HTN (hypertension), benign     Hypercholesteremia     Mild carotid artery disease 2021    Osteopenia     Unspecified disorder of kidney and ureter     kidney stones     Past Surgical History:   Procedure Laterality Date    APPENDECTOMY      ARTHROSCOPIC REPAIR OF ROTATOR CUFF OF SHOULDER Left 2021    Procedure: REPAIR, ROTATOR CUFF, ARTHROSCOPIC;  Surgeon: Priyank Thomas II, MD;  Location: [facility] OR;  Service: Orthopedics;  Laterality: Left;  possible graft agumentation    ARTHROSCOPY OF SHOULDER WITH DECOMPRESSION OF SUBACROMIAL SPACE Left 2021    Procedure: ARTHROSCOPY, SHOULDER, WITH SUBACROMIAL SPACE DECOMPRESSION;  Surgeon: Priyank Thomas II, MD;  Location: [facility] OR;  Service: Orthopedics;  Laterality: Left;    ARTHROSCOPY OF SHOULDER WITH REMOVAL OF DISTAL CLAVICLE Left 2021    Procedure: ARTHROSCOPY, SHOULDER, WITH DISTAL CLAVICLE EXCISION;  Surgeon: Priyank Thomas II, MD;  Location: [facility] OR;  Service: Orthopedics;  Laterality: Left;    CARDIAC CATHETERIZATION  2006    CARPAL TUNNEL RELEASE Left 2013    Left CTR    CARPAL TUNNEL RELEASE Left 2007    Left CTR    CARPAL TUNNEL RELEASE Right 2007    Right CTR     SECTION      COLONOSCOPY      MIGUEL    COLONOSCOPY W/ BIOPSIES  2011    precancer, repeat  Miguel    CORONARY ANGIOGRAPHY N/A 1/10/2019    Procedure: ANGIOGRAM, CORONARY ARTERY;  Surgeon: Sharmaine Grigsby MD;  Location: [facility] CATH;  Service: Cardiology;  Laterality: N/A;    ELBOW SURGERY Left 2013     Left Ulnar Nerve Transposition    FIXATION OF TENDON Left 6/30/2021    Procedure: FIXATION, TENDON - open biceps tenodesis;  Surgeon: Priyank Thomas II, MD;  Location: [facility] OR;  Service: Orthopedics;  Laterality: Left;    JOINT REPLACEMENT Right 06/30/2015    Right TKA    JOINT REPLACEMENT Left 08/25/2008    Left TKA    LEFT HEART CATHETERIZATION Left 1/10/2019    Procedure: Left heart cath;  Surgeon: Sharmaine Grigsby MD;  Location: [facility] CATH;  Service: Cardiology;  Laterality: Left;    lip surgery      skin cancer    PARATHYROID GLAND SURGERY      REVERSE TOTAL SHOULDER ARTHROPLASTY Left 11/24/2021    Procedure: ARTHROPLASTY, SHOULDER, TOTAL, REVERSE;  Surgeon: Priyank Thomas II, MD;  Location: [facility] OR;  Service: Orthopedics;  Laterality: Left;    SHOULDER ARTHROSCOPY Right 06/15/2004    Right Shoulder Scope    TONSILLECTOMY      TOTAL ABDOMINAL HYSTERECTOMY  age 49    ovaries remain done for benign reasons    TOTAL KNEE ARTHROPLASTY  2008    tvt       Family History   Problem Relation Name Age of Onset    Asthma Mother      Heart disease Father      Heart attack Son       Social History     Socioeconomic History    Marital status:    Tobacco Use    Smoking status: Never    Smokeless tobacco: Never   Substance and Sexual Activity    Alcohol use: Yes     Comment: ocassioanlly    Drug use: No    Sexual activity: Yes     Partners: Male     Birth control/protection: Post-menopausal, Surgical     Social Determinants of Health     Financial Resource Strain: Medium Risk (6/18/2024)    Overall Financial Resource Strain (CARDIA)     Difficulty of Paying Living Expenses: Somewhat hard   Food Insecurity: No Food Insecurity (6/18/2024)    Hunger Vital Sign     Worried About Running Out of Food in the Last Year: Never true     Ran Out of Food in the Last Year: Never true   Transportation Needs: No Transportation Needs (5/21/2024)    Received from [facility]    PRAPARE - Transportation     Lack of  Transportation (Medical): No     Lack of Transportation (Non-Medical): No   Physical Activity: Insufficiently Active (6/18/2024)    Exercise Vital Sign     Days of Exercise per Week: 2 days     Minutes of Exercise per Session: 10 min   Stress: No Stress Concern Present (6/18/2024)    [place] [employer] - Occupational Stress Questionnaire     Feeling of Stress : Not at all   Housing Stability: Unknown (6/18/2024)    Housing Stability Vital Sign     Unable to Pay for Housing in the Last Year: No       Review of patient's allergies indicates:  No Known Allergies    Current Outpatient Medications:     acetaminophen (TYLENOL) 325 MG tablet, Take 2 tablets (650 mg total) by mouth every 6 (six) hours., Disp: 56 tablet, Rfl: 0    acyclovir (ZOVIRAX) 400 MG tablet, TAKE ONE TABLET BY MOUTH THREE TIMES DAILY FOR 5-7 DAYS FEVER BLISTERS, Disp: , Rfl:     ALBUTEROL INHL, Inhale 2 puffs into the lungs daily as needed., Disp: , Rfl:     ascorbic acid, vitamin C, (VITAMIN C) 500 MG tablet, Take 500 mg by mouth once daily., Disp: , Rfl:     aspirin 81 MG Chew, Take 81 mg by mouth every evening., Disp: , Rfl:     azelastine (OPTIVAR) 0.05 % ophthalmic solution, Place 1 drop into both eyes 2 (two) times daily., Disp: , Rfl:     biotin 1 mg Cap, Take 1 mg by mouth once daily., Disp: , Rfl:     cephALEXin (KEFLEX) 500 MG capsule, Take 500 mg by mouth 3 (three) times daily., Disp: , Rfl:     cetirizine (ZYRTEC) 10 MG tablet, Take 10 mg by mouth once daily., Disp: , Rfl:     clobetasoL (TEMOVATE) 0.05 % cream, Apply topically 2 (two) times daily., Disp: 60 g, Rfl: 2    colestipoL (COLESTID) 1 gram Tab, Take 1 g by mouth 2 (two) times daily., Disp: , Rfl:     cyclobenzaprine (FLEXERIL) 10 MG tablet, Take 1 tablet (10 mg total) by mouth every 12 (twelve) hours as needed for Muscle spasms., Disp: 40 tablet, Rfl: 0    dicyclomine (BENTYL) 10 MG capsule, , Disp: , Rfl:     diphenoxylate-atropine 2.5-0.025 mg (LOMOTIL)  2.5-0.025 mg per tablet, take ONE to TWO tablets BY MOUTH after each stool up to EIGHT tablets a day, Disp: , Rfl:     estradioL (ESTRACE) 0.01 % (0.1 mg/gram) vaginal cream, PLACE TWO GRAMS VAGINALLY THREE TIMES WEEKLY, Disp: , Rfl:     fluconazole (DIFLUCAN) 150 MG Tab, Take 150 mg by mouth once., Disp: , Rfl:     fluticasone furoate-vilanteroL (BREO) 100-25 mcg/dose diskus inhaler, Inhale 1 puff into the lungs nightly as needed. , Disp: , Rfl:     fluticasone propionate (FLONASE) 50 mcg/actuation nasal spray, 2 sprays by Nasal route daily as needed. , Disp: , Rfl:     glimepiride (AMARYL) 4 MG tablet, Take 1 tablet by mouth 2 (two) times daily. , Disp: , Rfl: 0    guaiFENesin-codeine 100-10 mg/5 ml (TUSSI-ORGANIDIN NR)  mg/5 mL syrup, Take by mouth., Disp: , Rfl:     hydrocortisone 1 % ointment, Apply topically 2 (two) times daily as needed., Disp: , Rfl:     hydroxychloroquine (PLAQUENIL) 200 mg tablet, Take 1 tablet (200 mg total) by mouth once daily., Disp: 30 tablet, Rfl: 1    hyoscyamine (LEVSIN/SL) 0.125 mg Subl, Place 1 tablet under the tongue every 6 (six) hours as needed. , Disp: , Rfl: 0    ibuprofen (ADVIL,MOTRIN) 400 MG tablet, Take 1 tablet (400 mg total) by mouth every 6 (six) hours as needed., Disp: 30 tablet, Rfl: 1    latanoprost 0.005 % ophthalmic solution, Place 1 drop into both eyes nightly., Disp: , Rfl:     levalbuterol (XOPENEX HFA) 45 mcg/actuation inhaler, Inhale 2 puffs into the lungs every 4 (four) hours as needed., Disp: , Rfl:     losartan (COZAAR) 25 MG tablet, TAKE ONE TABLET BY MOUTH ONCE DAILY, Disp: 90 tablet, Rfl: 1    montelukast (SINGULAIR) 10 mg tablet, Take 10 mg by mouth every evening. at bedtime., Disp: , Rfl:     multivitamin capsule, Take 1 capsule by mouth once daily., Disp: , Rfl:     mupirocin (BACTROBAN) 2 % ointment, Apply topically 3 (three) times daily., Disp: , Rfl:     omeprazole (PRILOSEC) 40 MG capsule, Take 40 mg by mouth every morning. , Disp: , Rfl:      PROAIR HFA 90 mcg/actuation inhaler, Inhale 1 puff into the lungs every 6 (six) hours as needed. , Disp: , Rfl: 0    rosuvastatin (CRESTOR) 20 MG tablet, TAKE ONE TABLET BY MOUTH EVERY EVENING, Disp: 90 tablet, Rfl: 1    semaglutide (OZEMPIC) 0.25 mg or 0.5 mg (2 mg/3 mL) pen injector, inject 0.25mg SUBCUTANEOUSLY once a week for 56 days dx code: E11.42, Disp: , Rfl:     sucralfate (CARAFATE) 1 gram tablet, Take 1 g by mouth 2 (two) times daily. , Disp: , Rfl:     triamterene-hydrochlorothiazide 37.5-25 mg (DYAZIDE) 37.5-25 mg per capsule, TAKE 1 CAPSULE BY MOUTH  EVERY OTHER DAY (Patient taking differently: 2 (two) times a day.), Disp: 45 capsule, Rfl: 3    verapamiL (VERELAN) 120 MG C24P, Take 1 capsule (120 mg total) by mouth once daily., Disp: 90 capsule, Rfl: 1    vitamin D 1000 units Tab, Take 185 mg by mouth once daily., Disp: , Rfl:     iron-vitamin C 100-250 mg, ICAR-C, 100-250 mg Tab, Take 1 tablet by mouth. (Patient not taking: Reported on 6/25/2024), Disp: , Rfl:     nitroGLYCERIN (NITROSTAT) 0.4 MG SL tablet, Place 1 tablet (0.4 mg total) under the tongue every 5 (five) minutes as needed., Disp: 25 tablet, Rfl: 0    oxyCODONE-acetaminophen (PERCOCET) 7.5-325 mg per tablet, Take 1 tablet by mouth every 12 (twelve) hours as needed for Pain. (Patient not taking: Reported on 6/25/2024), Disp: 30 tablet, Rfl: 0    semaglutide (OZEMPIC) 0.25 mg or 0.5 mg(2 mg/1.5 mL) pen injector, Inject into the skin once a week. sundays (Patient not taking: Reported on 6/25/2024), Disp: , Rfl:     Review of Systems   Constitutional: Negative for chills, diaphoresis, fever, malaise/fatigue and night sweats. Weight gain: SOME.  HENT:  Negative for congestion and sore throat.    Eyes:  Negative for blurred vision (CATARCT) and visual disturbance.   Cardiovascular:  Negative for chest pain, claudication, cyanosis, dyspnea on exertion (MILD), irregular heartbeat, leg swelling, near-syncope, orthopnea, palpitations,  paroxysmal nocturnal dyspnea and syncope.   Respiratory:  Negative for cough, hemoptysis, shortness of breath and wheezing.    Hematologic/Lymphatic: Negative for adenopathy. Does not bruise/bleed easily.   Skin:  Negative for color change and rash.   Musculoskeletal:  Positive for joint pain (L SHOULDER). Negative for falls. Neck pain: ONCE POSITIONAL.  Gastrointestinal:  Negative for abdominal pain, change in bowel habit, dysphagia, jaundice, melena and nausea.   Genitourinary:  Negative for dysuria and flank pain.   Neurological:  Negative for brief paralysis, focal weakness, light-headedness, loss of balance, paresthesias and weakness. Dizziness: OCC ORTHOSTASIS.  Psychiatric/Behavioral:  Negative for altered mental status and depression.    Allergic/Immunologic: Negative for persistent infections.        Objective:      Vitals:    06/25/24 1355   BP: 139/67   Pulse: 64   Weight: 90.5 kg (199 lb 8.3 oz)   PainSc: 0-No pain     Body mass index is 34.25 kg/m².    Physical Exam  Constitutional:       Appearance: She is well-developed. She is obese.   HENT:      Head: Normocephalic and atraumatic.   Eyes:      General: No scleral icterus.     Extraocular Movements: Extraocular movements intact.      Conjunctiva/sclera: Conjunctivae normal.   Neck:      Vascular: Normal carotid pulses. No carotid bruit or JVD.   Cardiovascular:      Rate and Rhythm: Normal rate and regular rhythm. No extrasystoles are present.     Pulses:           Carotid pulses are 2+ on the right side and 2+ on the left side.       Radial pulses are 2+ on the right side and 2+ on the left side.        Posterior tibial pulses are 2+ on the right side and 2+ on the left side.      Heart sounds: Murmur heard.      Systolic murmur is present with a grade of 1/6 at the lower left sternal border.      No friction rub. No gallop.   Pulmonary:      Effort: Pulmonary effort is normal.      Breath sounds: Normal breath sounds. No rales.   Abdominal:       Palpations: Abdomen is soft. There is no hepatomegaly.      Tenderness: There is no abdominal tenderness.   Musculoskeletal:         General: Normal range of motion.      Cervical back: Neck supple.      Right lower leg: No edema.      Left lower leg: No edema.      Comments: VARICOSE VEINS   Skin:     General: Skin is warm and dry.      Capillary Refill: Capillary refill takes less than 2 seconds.   Neurological:      General: No focal deficit present.      Mental Status: She is alert and oriented to person, place, and time.   Psychiatric:         Mood and Affect: Mood normal.         Speech: Speech normal.         Behavior: Behavior normal.                 ..    Chemistry        Component Value Date/Time     (L) 06/19/2024 0925     06/16/2015 0950    K 5.0 06/19/2024 0925    K 4.3 06/16/2015 0950     06/19/2024 0925     06/16/2015 0950    CO2 23 06/19/2024 0925    BUN 24 (H) 06/19/2024 0925    CREATININE 1.1 06/19/2024 0925    CREATININE 1.04 06/16/2015 0950     (H) 06/19/2024 0925        Component Value Date/Time    CALCIUM 9.7 06/19/2024 0925    ALKPHOS 89 06/19/2024 0925    AST 21 06/19/2024 0925    ALT 14 06/19/2024 0925    BILITOT 0.3 06/19/2024 0925    ESTGFRAFRICA >60.0 05/16/2022 0823    EGFRNONAA >60.0 05/16/2022 0823            ..  Lab Results   Component Value Date    CHOL 137 05/22/2023    CHOL 133 05/16/2022    CHOL 149 04/05/2019     Lab Results   Component Value Date    HDL 64 05/22/2023    HDL 55 05/16/2022    HDL 65 04/05/2019     Lab Results   Component Value Date    LDLCALC 54.0 (L) 05/22/2023    LDLCALC 61.4 (L) 05/16/2022    LDLCALC 63.0 04/05/2019     Lab Results   Component Value Date    TRIG 95 05/22/2023    TRIG 83 05/16/2022    TRIG 105 04/05/2019     Lab Results   Component Value Date    CHOLHDL 46.7 05/22/2023    CHOLHDL 41.4 05/16/2022    CHOLHDL 43.6 04/05/2019     ..  Lab Results   Component Value Date    WBC 6.30 05/22/2023    HGB 11.8 (L) 05/22/2023     HCT 35.7 (L) 05/22/2023    MCV 85 05/22/2023     (L) 05/22/2023       Test(s) Reviewed  I have reviewed the following in detail:  [] Stress test   [] Angiography   [] Echocardiogram   [x] Labs   [] Other:       Assessment:         ICD-10-CM ICD-9-CM   1. Coronary artery disease involving native coronary artery of native heart with other form of angina pectoris  I25.118 414.01     413.9   2. Mild carotid artery disease  I77.9 447.9   3. Combined hyperlipidemia associated with type 2 diabetes mellitus  E11.69 250.80    E78.2 272.2   4. HTN (hypertension), benign  I10 401.1   5. Obesity, Class I, BMI 30-34.9  E66.9 278.00   6. Stage 3a chronic kidney disease  N18.31 585.3     Problem List Items Addressed This Visit          Cardiac/Vascular    Combined hyperlipidemia associated with type 2 diabetes mellitus    Relevant Orders    Comprehensive Metabolic Panel    Lipid Panel    HTN (hypertension), benign    Coronary artery disease involving native coronary artery of native heart with other form of angina pectoris - Primary    Relevant Orders    Comprehensive Metabolic Panel    Lipid Panel    Mild carotid artery disease       Renal/    Stage 3a chronic kidney disease       Endocrine    Obesity, Class I, BMI 30-34.9        Plan:         ALL CV CLINICALLY STABLE, NO ANGINA, NO HF, NO TIA, NO CLINICAL ARRHYTHMIA,CONTINUE CURRENT MEDS, EDUCATION, DIET, EXERCISE , WEIGHT LOSS, RETURN TO CLINIC IN 6 MONTHS WITH LABS, INCREASED CV RISK WITH CKD AND DIABETES, DISCUSSED PLAN WITH THE PATIENT AND HER    Coronary artery disease involving native coronary artery of native heart with other form of angina pectoris  -     Comprehensive Metabolic Panel; Future; Expected date: 12/25/2024  -     Lipid Panel; Future; Expected date: 12/25/2024    Mild carotid artery disease    Combined hyperlipidemia associated with type 2 diabetes mellitus  -     Comprehensive Metabolic Panel; Future; Expected date: 12/25/2024  -     Lipid  Panel; Future; Expected date: 12/25/2024    HTN (hypertension), benign    Obesity, Class I, BMI 30-34.9    Stage 3a chronic kidney disease    RTC Low level/low impact aerobic exercise 5x's/wk. Heart healthy diet and risk factor modification.    See labs and med orders.    Aerobic exercise 5x's/wk. Heart healthy diet and risk factor modification.    See labs and med orders.

## 2024-07-10 ENCOUNTER — HOSPITAL ENCOUNTER (OUTPATIENT)
Dept: CARDIOLOGY | Facility: HOSPITAL | Age: 74
Discharge: HOME | End: 2024-07-10
Attending: INTERNAL MEDICINE
Payer: MEDICARE

## 2024-07-10 DIAGNOSIS — R60.9 EDEMA, UNSPECIFIED TYPE: ICD-10-CM

## 2024-07-10 PROCEDURE — 93970 EXTREMITY STUDY: CPT | Mod: PO

## 2024-07-10 PROCEDURE — 93970 EXTREMITY STUDY: CPT | Mod: 26,,, | Performed by: INTERNAL MEDICINE

## 2024-08-19 ENCOUNTER — OFFICE VISIT (OUTPATIENT)
Dept: PODIATRY | Facility: CLINIC | Age: 74
End: 2024-08-19
Payer: MEDICARE

## 2024-08-19 VITALS — HEIGHT: 64 IN | BODY MASS INDEX: 34.06 KG/M2 | WEIGHT: 199.5 LBS

## 2024-08-19 DIAGNOSIS — M19.071 ARTHRITIS OF RIGHT MIDFOOT: ICD-10-CM

## 2024-08-19 DIAGNOSIS — M20.41 HAMMER TOE OF RIGHT FOOT: ICD-10-CM

## 2024-08-19 DIAGNOSIS — M76.821 POSTERIOR TIBIAL TENDON DYSFUNCTION (PTTD) OF RIGHT LOWER EXTREMITY: Primary | ICD-10-CM

## 2024-08-19 PROCEDURE — 99213 OFFICE O/P EST LOW 20 MIN: CPT | Mod: S$GLB,,, | Performed by: PODIATRIST

## 2024-08-19 PROCEDURE — 1126F AMNT PAIN NOTED NONE PRSNT: CPT | Mod: CPTII,S$GLB,, | Performed by: PODIATRIST

## 2024-08-19 PROCEDURE — 1160F RVW MEDS BY RX/DR IN RCRD: CPT | Mod: CPTII,S$GLB,, | Performed by: PODIATRIST

## 2024-08-19 PROCEDURE — 99999 PR PBB SHADOW E&M-EST. PATIENT-LVL IV: CPT | Mod: PBBFAC,,, | Performed by: PODIATRIST

## 2024-08-19 PROCEDURE — 3288F FALL RISK ASSESSMENT DOCD: CPT | Mod: CPTII,S$GLB,, | Performed by: PODIATRIST

## 2024-08-19 PROCEDURE — 4010F ACE/ARB THERAPY RXD/TAKEN: CPT | Mod: CPTII,S$GLB,, | Performed by: PODIATRIST

## 2024-08-19 PROCEDURE — 1159F MED LIST DOCD IN RCRD: CPT | Mod: CPTII,S$GLB,, | Performed by: PODIATRIST

## 2024-08-19 PROCEDURE — 1101F PT FALLS ASSESS-DOCD LE1/YR: CPT | Mod: CPTII,S$GLB,, | Performed by: PODIATRIST

## 2024-08-19 NOTE — PROGRESS NOTES
Subjective:      Patient ID: Breanne Gregory is a 74 y.o. female.    Chief Complaint: Deirdre Raymundo is a 74 y.o. female who presents to the podiatry clinic  with complaint of  right foot pain. Onset of the symptoms was several weeks ago. Precipitating event: none known. Current symptoms include: ability to bear weight, but with some pain and worsening symptoms after a period of activity. Aggravating factors: any weight bearing. Symptoms have gradually worsened. Patient has had no prior foot problems. Evaluation to date: none. Treatment to date: none. Patients rates pain 6/10 on pain scale.    6/24/24: Patient returns for right foot pain that is better when wearing the boot, but when she gets into the shoes and powerstep inserts the pain returns quickly    8/19/24: Patient presents noting that the injection I gave her last appointment did wonders, she is able to walk in the shoes with the inserts with minimal discomfort and is doing well.    Review of Systems   Constitutional: Negative for chills and fever.   Cardiovascular:  Negative for claudication and leg swelling.   Respiratory:  Negative for shortness of breath.    Skin:  Negative for itching, nail changes and rash.   Musculoskeletal:  Negative for muscle cramps, muscle weakness and myalgias.        Right foot pain   Gastrointestinal:  Negative for nausea and vomiting.   Neurological:  Negative for focal weakness, loss of balance, numbness and paresthesias.           Objective:      Physical Exam  Constitutional:       General: She is not in acute distress.     Appearance: She is well-developed. She is not diaphoretic.   Cardiovascular:      Pulses:           Dorsalis pedis pulses are 2+ on the right side and 2+ on the left side.        Posterior tibial pulses are 2+ on the right side and 2+ on the left side.      Comments: < 3 sec capillary refill time to toes 1-5 bilateral. Toes and feet are warm to touch proximally with normal distal cooling b/l. There is  some hair growth on the feet and toes b/l. There is no edema b/l. No spider veins or varicosities present b/l.     Musculoskeletal:      Comments: Equinus noted b/l ankles with < 10 deg DF noted. MMT 5/5 in DF/PF/Inv/Ev resistance with no reproduction of pain in any direction. Passive range of motion of ankle and pedal joints is painless b/l.    Medial arch collapse with weight bearing more to the right, there is pain with palpation at the navicular tuberosity and distal PT tendon.     Right 3-4 toes reducible contracture at the PIPJ   Skin:     General: Skin is warm and dry.      Coloration: Skin is not pale.      Findings: No abrasion, bruising, burn, ecchymosis, erythema, laceration, lesion, petechiae or rash.      Nails: There is no clubbing.      Comments: Skin temperature, texture and turgor within normal limits.   Neurological:      Mental Status: She is alert and oriented to person, place, and time.      Sensory: No sensory deficit.      Motor: No tremor, atrophy or abnormal muscle tone.      Comments: Negative tinel sign bilateral.   Psychiatric:         Behavior: Behavior normal.               Assessment:       Encounter Diagnoses   Name Primary?    Posterior tibial tendon dysfunction (PTTD) of right lower extremity Yes    Arthritis of right midfoot     Hammer toe of right foot              Plan:       Breanne was seen today for cyst.    Diagnoses and all orders for this visit:    Posterior tibial tendon dysfunction (PTTD) of right lower extremity    Arthritis of right midfoot    Hammer toe of right foot          I counseled the patient on her conditions, their implications and medical management.    X-rays show significant arthritis and spurring to the dorsal NC joint right foot    A steroid injection can be done 1-2 a year if needed if the injection wears off, she is doing well right now    Wear the shoes and powerstep inserts daily    Consider custom orthotic inserts    Discussed that the right 3-4 toes  if they bother her enough can do an in office tenotomy procedure PRN    Return PRN    Dean Edouard, JUAN CARLOSM

## 2024-09-13 DIAGNOSIS — I25.118 CORONARY ARTERY DISEASE INVOLVING NATIVE CORONARY ARTERY OF NATIVE HEART WITH OTHER FORM OF ANGINA PECTORIS: ICD-10-CM

## 2024-09-13 DIAGNOSIS — I10 HTN (HYPERTENSION), BENIGN: ICD-10-CM

## 2024-09-13 DIAGNOSIS — I10 ESSENTIAL HYPERTENSION: ICD-10-CM

## 2024-09-13 RX ORDER — LOSARTAN POTASSIUM 25 MG/1
25 TABLET ORAL
Qty: 90 TABLET | Refills: 0 | Status: SHIPPED | OUTPATIENT
Start: 2024-09-13

## 2024-10-10 ENCOUNTER — TELEPHONE (OUTPATIENT)
Dept: PODIATRY | Facility: CLINIC | Age: 74
End: 2024-10-10
Payer: MEDICARE

## 2024-10-10 NOTE — TELEPHONE ENCOUNTER
----- Message from Jalyn sent at 10/10/2024  3:36 PM CDT -----  Contact: self  Type:  Sooner Appointment Request    Caller is requesting a sooner appointment.  Caller declined first available appointment listed below.  Caller will not accept being placed on the waitlist and is requesting a message be sent to doctor.    Name of Caller:  pt   When is the first available appointment?  11/3/24   Symptoms:  right foot by her spur little lower, now has a lump/knot red and swollen  Would the patient rather a call back or a response via [facility]? Call back   Best Call Back Number:  [phone]  Additional Information:  Thanks

## 2024-11-04 ENCOUNTER — OFFICE VISIT (OUTPATIENT)
Dept: PODIATRY | Facility: CLINIC | Age: 74
End: 2024-11-04
Payer: MEDICARE

## 2024-11-04 DIAGNOSIS — M19.071 ARTHRITIS OF RIGHT MIDFOOT: ICD-10-CM

## 2024-11-04 DIAGNOSIS — M76.821 POSTERIOR TIBIAL TENDON DYSFUNCTION (PTTD) OF RIGHT LOWER EXTREMITY: Primary | ICD-10-CM

## 2024-11-04 PROCEDURE — 99999 PR PBB SHADOW E&M-EST. PATIENT-LVL IV: CPT | Mod: PBBFAC,,, | Performed by: PODIATRIST

## 2024-11-04 RX ORDER — METHYLPREDNISOLONE ACETATE 40 MG/ML
40 INJECTION, SUSPENSION INTRA-ARTICULAR; INTRALESIONAL; INTRAMUSCULAR; SOFT TISSUE
Status: COMPLETED | OUTPATIENT
Start: 2024-11-04 | End: 2024-11-04

## 2024-11-04 RX ADMIN — METHYLPREDNISOLONE ACETATE 40 MG: 40 INJECTION, SUSPENSION INTRA-ARTICULAR; INTRALESIONAL; INTRAMUSCULAR; SOFT TISSUE at 08:11

## 2024-11-04 NOTE — PROGRESS NOTES
Subjective:      Patient ID: Breanne Gregory is a 74 y.o. female.    Chief Complaint: knot on foot    Breanne is a 74 y.o. female who presents to the podiatry clinic  with complaint of  right foot pain. Onset of the symptoms was several weeks ago. Precipitating event: none known. Current symptoms include: ability to bear weight, but with some pain and worsening symptoms after a period of activity. Aggravating factors: any weight bearing. Symptoms have gradually worsened. Patient has had no prior foot problems. Evaluation to date: none. Treatment to date: none. Patients rates pain 6/10 on pain scale.    6/24/24: Patient returns for right foot pain that is better when wearing the boot, but when she gets into the shoes and powerstep inserts the pain returns quickly    8/19/24: Patient presents noting that the injection I gave her last appointment did wonders, she is able to walk in the shoes with the inserts with minimal discomfort and is doing well.    11/4/24: Patient returns for follow up right foot pain, the injection helped in June but has worn off and the pain has returned. She is wearing her OTC inserts    Review of Systems   Constitutional: Negative for chills and fever.   Cardiovascular:  Negative for claudication and leg swelling.   Respiratory:  Negative for shortness of breath.    Skin:  Negative for itching, nail changes and rash.   Musculoskeletal:  Negative for muscle cramps, muscle weakness and myalgias.        Right foot pain   Gastrointestinal:  Negative for nausea and vomiting.   Neurological:  Negative for focal weakness, loss of balance, numbness and paresthesias.           Objective:      Physical Exam  Constitutional:       General: She is not in acute distress.     Appearance: She is well-developed. She is not diaphoretic.   Cardiovascular:      Pulses:           Dorsalis pedis pulses are 2+ on the right side and 2+ on the left side.        Posterior tibial pulses are 2+ on the right side and  2+ on the left side.      Comments: < 3 sec capillary refill time to toes 1-5 bilateral. Toes and feet are warm to touch proximally with normal distal cooling b/l. There is some hair growth on the feet and toes b/l. There is no edema b/l. No spider veins or varicosities present b/l.     Musculoskeletal:      Comments: Equinus noted b/l ankles with < 10 deg DF noted. MMT 5/5 in DF/PF/Inv/Ev resistance with no reproduction of pain in any direction. Passive range of motion of ankle and pedal joints is painless b/l.    Medial arch collapse with weight bearing more to the right, there is pain with palpation at the navicular tuberosity and distal PT tendon.     Right 3-4 toes reducible contracture at the PIPJ   Skin:     General: Skin is warm and dry.      Coloration: Skin is not pale.      Findings: No abrasion, bruising, burn, ecchymosis, erythema, laceration, lesion, petechiae or rash.      Nails: There is no clubbing.      Comments: Skin temperature, texture and turgor within normal limits.   Neurological:      Mental Status: She is alert and oriented to person, place, and time.      Sensory: No sensory deficit.      Motor: No tremor, atrophy or abnormal muscle tone.      Comments: Negative tinel sign bilateral.   Psychiatric:         Behavior: Behavior normal.               Assessment:       Encounter Diagnoses   Name Primary?    Posterior tibial tendon dysfunction (PTTD) of right lower extremity Yes    Arthritis of right midfoot              Plan:       Breanne was seen today for knot on foot.    Diagnoses and all orders for this visit:    Posterior tibial tendon dysfunction (PTTD) of right lower extremity  -     ANKLE FOOT ORTHOSIS FOR HOME USE    Arthritis of right midfoot  -     ANKLE FOOT ORTHOSIS FOR HOME USE    Other orders  -     methylPREDNISolone acetate injection 40 mg          I counseled the patient on her conditions, their implications and medical management.    X-rays show significant arthritis and  spurring to the dorsal NC joint right foot    A steroid injection was done to the NC joint with 40 mg depo and 1 cc lidocaine plain was performed by me    Wear the shoes and powerstep inserts daily    Consider custom orthotic inserts or AFO, she wants to try an AFO so I gave her a prescription and list of places to go    Return PRN    JUAN CARLOS AndersonM

## 2024-11-09 DIAGNOSIS — I10 ESSENTIAL HYPERTENSION: ICD-10-CM

## 2024-11-11 RX ORDER — VERAPAMIL HYDROCHLORIDE 120 MG/1
120 CAPSULE, EXTENDED RELEASE ORAL
Qty: 90 CAPSULE | Refills: 0 | Status: SHIPPED | OUTPATIENT
Start: 2024-11-11

## 2025-01-27 ENCOUNTER — LAB VISIT (OUTPATIENT)
Dept: LAB | Facility: HOSPITAL | Age: 75
End: 2025-01-27
Attending: INTERNAL MEDICINE
Payer: MEDICARE

## 2025-01-27 DIAGNOSIS — E78.2 COMBINED HYPERLIPIDEMIA ASSOCIATED WITH TYPE 2 DIABETES MELLITUS: Chronic | ICD-10-CM

## 2025-01-27 DIAGNOSIS — E11.69 COMBINED HYPERLIPIDEMIA ASSOCIATED WITH TYPE 2 DIABETES MELLITUS: Chronic | ICD-10-CM

## 2025-01-27 DIAGNOSIS — I25.118 CORONARY ARTERY DISEASE INVOLVING NATIVE CORONARY ARTERY OF NATIVE HEART WITH OTHER FORM OF ANGINA PECTORIS: Chronic | ICD-10-CM

## 2025-01-27 LAB
ALBUMIN SERPL BCP-MCNC: 3.7 G/DL (ref 3.5–5.2)
ALP SERPL-CCNC: 79 U/L (ref 40–150)
ALT SERPL W/O P-5'-P-CCNC: 13 U/L (ref 10–44)
ANION GAP SERPL CALC-SCNC: 8 MMOL/L (ref 8–16)
AST SERPL-CCNC: 20 U/L (ref 10–40)
BILIRUB SERPL-MCNC: 0.6 MG/DL (ref 0.1–1)
BUN SERPL-MCNC: 15 MG/DL (ref 8–23)
CALCIUM SERPL-MCNC: 9.3 MG/DL (ref 8.7–10.5)
CHLORIDE SERPL-SCNC: 108 MMOL/L (ref 95–110)
CHOLEST SERPL-MCNC: 147 MG/DL (ref 120–199)
CHOLEST/HDLC SERPL: 2.3 {RATIO} (ref 2–5)
CO2 SERPL-SCNC: 24 MMOL/L (ref 23–29)
CREAT SERPL-MCNC: 0.8 MG/DL (ref 0.5–1.4)
EST. GFR  (NO RACE VARIABLE): >60 ML/MIN/1.73 M^2
GLUCOSE SERPL-MCNC: 96 MG/DL (ref 70–110)
HDLC SERPL-MCNC: 65 MG/DL (ref 40–75)
HDLC SERPL: 44.2 % (ref 20–50)
LDLC SERPL CALC-MCNC: 67.2 MG/DL (ref 63–159)
NONHDLC SERPL-MCNC: 82 MG/DL
POTASSIUM SERPL-SCNC: 3.9 MMOL/L (ref 3.5–5.1)
PROT SERPL-MCNC: 6.6 G/DL (ref 6–8.4)
SODIUM SERPL-SCNC: 140 MMOL/L (ref 136–145)
TRIGL SERPL-MCNC: 74 MG/DL (ref 30–150)

## 2025-01-27 PROCEDURE — 36415 COLL VENOUS BLD VENIPUNCTURE: CPT | Mod: PO | Performed by: INTERNAL MEDICINE

## 2025-01-27 PROCEDURE — 80061 LIPID PANEL: CPT | Performed by: INTERNAL MEDICINE

## 2025-01-27 PROCEDURE — 80053 COMPREHEN METABOLIC PANEL: CPT | Performed by: INTERNAL MEDICINE

## 2025-01-28 DIAGNOSIS — E78.00 HYPERCHOLESTEROLEMIA: ICD-10-CM

## 2025-01-28 RX ORDER — ROSUVASTATIN CALCIUM 20 MG/1
TABLET, COATED ORAL
Qty: 90 TABLET | Refills: 0 | Status: SHIPPED | OUTPATIENT
Start: 2025-01-28

## 2025-02-03 NOTE — PROGRESS NOTES
Subjective:    Patient ID:  Breanne Gregory is a 74 y.o. female who presents for Follow-up, Heart Problem, and Coronary Artery Disease        HPI  DISCUSSED LABS AND GOALS CMP OK LDL 67 UP FROM 54, HDL 65, TRIGLYCERIDE 74, RECENT SHOULDER X-RAY OK, DOING WELL,BP FLUCTUATES, LAST HBA1C 6.1 %, HAD STEROID INJECTIONS IN R FOOT,  SEE ROS    Past Medical History:   Diagnosis Date    Arthritis     Asthma, chronic     Combined hyperlipidemia associated with type 2 diabetes mellitus     DM (diabetes mellitus)     Gastroparesis     HTN (hypertension), benign     Hypercholesteremia     Mild carotid artery disease 2021    Osteopenia     Unspecified disorder of kidney and ureter     kidney stones     Past Surgical History:   Procedure Laterality Date    APPENDECTOMY      ARTHROSCOPIC REPAIR OF ROTATOR CUFF OF SHOULDER Left 2021    Procedure: REPAIR, ROTATOR CUFF, ARTHROSCOPIC;  Surgeon: Priyank Thomas II, MD;  Location: [facility];  Service: Orthopedics;  Laterality: Left;  possible graft agumentation    ARTHROSCOPY OF SHOULDER WITH DECOMPRESSION OF SUBACROMIAL SPACE Left 2021    Procedure: ARTHROSCOPY, SHOULDER, WITH SUBACROMIAL SPACE DECOMPRESSION;  Surgeon: Priyank Thomas II, MD;  Location: [facility] OR;  Service: Orthopedics;  Laterality: Left;    ARTHROSCOPY OF SHOULDER WITH REMOVAL OF DISTAL CLAVICLE Left 2021    Procedure: ARTHROSCOPY, SHOULDER, WITH DISTAL CLAVICLE EXCISION;  Surgeon: Priyank Thomas II, MD;  Location: [facility] OR;  Service: Orthopedics;  Laterality: Left;    CARDIAC CATHETERIZATION  2006    CARPAL TUNNEL RELEASE Left 2013    Left CTR    CARPAL TUNNEL RELEASE Left 2007    Left CTR    CARPAL TUNNEL RELEASE Right 2007    Right CTR     SECTION      COLONOSCOPY      MIGUEL    COLONOSCOPY W/ BIOPSIES  2011    precancer, repeat  Miguel    CORONARY ANGIOGRAPHY N/A 1/10/2019    Procedure: ANGIOGRAM, CORONARY ARTERY;  Surgeon: Sharmaine Grigsby MD;  Location: [facility]  CATH;  Service: Cardiology;  Laterality: N/A;    ELBOW SURGERY Left 06/11/2013    Left Ulnar Nerve Transposition    FIXATION OF TENDON Left 6/30/2021    Procedure: FIXATION, TENDON - open biceps tenodesis;  Surgeon: Priyank Thomas II, MD;  Location: [facility];  Service: Orthopedics;  Laterality: Left;    JOINT REPLACEMENT Right 06/30/2015    Right TKA    JOINT REPLACEMENT Left 08/25/2008    Left TKA    LEFT HEART CATHETERIZATION Left 1/10/2019    Procedure: Left heart cath;  Surgeon: Sharmaine Grigsby MD;  Location: [facility] CATH;  Service: Cardiology;  Laterality: Left;    lip surgery      skin cancer    PARATHYROID GLAND SURGERY      REVERSE TOTAL SHOULDER ARTHROPLASTY Left 11/24/2021    Procedure: ARTHROPLASTY, SHOULDER, TOTAL, REVERSE;  Surgeon: Priyank Thomas II, MD;  Location: [facility] OR;  Service: Orthopedics;  Laterality: Left;    SHOULDER ARTHROSCOPY Right 06/15/2004    Right Shoulder Scope    TONSILLECTOMY      TOTAL ABDOMINAL HYSTERECTOMY  age 49    ovaries remain done for benign reasons    TOTAL KNEE ARTHROPLASTY  2008    tvt       Family History   Problem Relation Name Age of Onset    Asthma Mother      Heart disease Father      Heart attack Son       Social History     Socioeconomic History    Marital status:    Tobacco Use    Smoking status: Never    Smokeless tobacco: Never   Substance and Sexual Activity    Alcohol use: Yes     Comment: ocassioanlly    Drug use: No    Sexual activity: Yes     Partners: Male     Birth control/protection: Post-menopausal, Surgical     Social Drivers of Health     Financial Resource Strain: Medium Risk (6/18/2024)    Overall Financial Resource Strain (CARDIA)     Difficulty of Paying Living Expenses: Somewhat hard   Food Insecurity: No Food Insecurity (6/18/2024)    Hunger Vital Sign     Worried About Running Out of Food in the Last Year: Never true     Ran Out of Food in the Last Year: Never true   Transportation Needs: No Transportation Needs (5/21/2024)    Received  from [facility]    PRAPARE - Transportation     Lack of Transportation (Medical): No     Lack of Transportation (Non-Medical): No   Physical Activity: Insufficiently Active (6/18/2024)    Exercise Vital Sign     Days of Exercise per Week: 2 days     Minutes of Exercise per Session: 10 min   Stress: No Stress Concern Present (6/18/2024)    [employer] [place] [employer] - Occupational Stress Questionnaire     Feeling of Stress : Not at all   Housing Stability: Unknown (6/18/2024)    Housing Stability Vital Sign     Unable to Pay for Housing in the Last Year: No       Review of patient's allergies indicates:  No Known Allergies    Current Outpatient Medications:     acetaminophen (TYLENOL) 325 MG tablet, Take 2 tablets (650 mg total) by mouth every 6 (six) hours., Disp: 56 tablet, Rfl: 0    acyclovir (ZOVIRAX) 400 MG tablet, TAKE ONE TABLET BY MOUTH THREE TIMES DAILY FOR 5-7 DAYS FEVER BLISTERS, Disp: , Rfl:     ALBUTEROL INHL, Inhale 2 puffs into the lungs daily as needed., Disp: , Rfl:     ascorbic acid, vitamin C, (VITAMIN C) 500 MG tablet, Take 500 mg by mouth once daily., Disp: , Rfl:     aspirin 81 MG Chew, Take 81 mg by mouth every evening., Disp: , Rfl:     azelastine (OPTIVAR) 0.05 % ophthalmic solution, Place 1 drop into both eyes 2 (two) times daily., Disp: , Rfl:     biotin 1 mg Cap, Take 1 mg by mouth once daily., Disp: , Rfl:     cephALEXin (KEFLEX) 500 MG capsule, Take 500 mg by mouth 3 (three) times daily., Disp: , Rfl:     cetirizine (ZYRTEC) 10 MG tablet, Take 10 mg by mouth once daily., Disp: , Rfl:     clobetasoL (TEMOVATE) 0.05 % cream, Apply topically 2 (two) times daily., Disp: 60 g, Rfl: 2    colestipoL (COLESTID) 1 gram Tab, Take 1 g by mouth 2 (two) times daily., Disp: , Rfl:     cyclobenzaprine (FLEXERIL) 10 MG tablet, Take 1 tablet (10 mg total) by mouth every 12 (twelve) hours as needed for Muscle spasms., Disp: 40 tablet, Rfl: 0    dicyclomine (BENTYL) 10 MG  capsule, , Disp: , Rfl:     diphenoxylate-atropine 2.5-0.025 mg (LOMOTIL) 2.5-0.025 mg per tablet, take ONE to TWO tablets BY MOUTH after each stool up to EIGHT tablets a day, Disp: , Rfl:     estradioL (ESTRACE) 0.01 % (0.1 mg/gram) vaginal cream, PLACE TWO GRAMS VAGINALLY THREE TIMES WEEKLY, Disp: , Rfl:     fluconazole (DIFLUCAN) 150 MG Tab, Take 150 mg by mouth once., Disp: , Rfl:     fluticasone furoate-vilanteroL (BREO) 100-25 mcg/dose diskus inhaler, Inhale 1 puff into the lungs nightly as needed. , Disp: , Rfl:     fluticasone propionate (FLONASE) 50 mcg/actuation nasal spray, 2 sprays by Nasal route daily as needed. , Disp: , Rfl:     glimepiride (AMARYL) 4 MG tablet, Take 1 tablet by mouth 2 (two) times daily. , Disp: , Rfl: 0    guaiFENesin-codeine 100-10 mg/5 ml (TUSSI-ORGANIDIN NR)  mg/5 mL syrup, Take by mouth., Disp: , Rfl:     hydrocortisone 1 % ointment, Apply topically 2 (two) times daily as needed., Disp: , Rfl:     hydroxychloroquine (PLAQUENIL) 200 mg tablet, Take 1 tablet (200 mg total) by mouth once daily., Disp: 30 tablet, Rfl: 4    hyoscyamine (LEVSIN/SL) 0.125 mg Subl, Place 1 tablet under the tongue every 6 (six) hours as needed. , Disp: , Rfl: 0    ibuprofen (ADVIL,MOTRIN) 400 MG tablet, Take 1 tablet (400 mg total) by mouth every 6 (six) hours as needed., Disp: 30 tablet, Rfl: 1    latanoprost 0.005 % ophthalmic solution, Place 1 drop into both eyes nightly., Disp: , Rfl:     levalbuterol (XOPENEX HFA) 45 mcg/actuation inhaler, Inhale 2 puffs into the lungs every 4 (four) hours as needed., Disp: , Rfl:     montelukast (SINGULAIR) 10 mg tablet, Take 10 mg by mouth every evening. at bedtime., Disp: , Rfl:     multivitamin capsule, Take 1 capsule by mouth once daily., Disp: , Rfl:     mupirocin (BACTROBAN) 2 % ointment, Apply topically 3 (three) times daily., Disp: , Rfl:     nitroGLYCERIN (NITROSTAT) 0.4 MG SL tablet, Place 1 tablet (0.4 mg total) under the tongue every 5 (five)  minutes as needed., Disp: 25 tablet, Rfl: 0    omeprazole (PRILOSEC) 40 MG capsule, Take 40 mg by mouth every morning. , Disp: , Rfl:     PROAIR HFA 90 mcg/actuation inhaler, Inhale 1 puff into the lungs every 6 (six) hours as needed. , Disp: , Rfl: 0    rosuvastatin (CRESTOR) 20 MG tablet, take 1 tablet by mouth once DAILY EVERY EVENING, Disp: 90 tablet, Rfl: 0    semaglutide (OZEMPIC) 0.25 mg or 0.5 mg (2 mg/3 mL) pen injector, inject 0.25mg SUBCUTANEOUSLY once a week for 56 days dx code: E11.42, Disp: , Rfl:     semaglutide (OZEMPIC) 0.25 mg or 0.5 mg(2 mg/1.5 mL) pen injector, Inject into the skin once a week. sundays, Disp: , Rfl:     sucralfate (CARAFATE) 1 gram tablet, Take 1 g by mouth 2 (two) times daily. , Disp: , Rfl:     triamterene-hydrochlorothiazide 37.5-25 mg (DYAZIDE) 37.5-25 mg per capsule, TAKE 1 CAPSULE BY MOUTH  EVERY OTHER DAY, Disp: 45 capsule, Rfl: 3    verapamiL (VERELAN) 120 MG C24P, TAKE ONE CAPSULE BY MOUTH EVERY DAY, Disp: 90 capsule, Rfl: 0    vitamin D 1000 units Tab, Take 185 mg by mouth once daily., Disp: , Rfl:     iron-vitamin C 100-250 mg, ICAR-C, 100-250 mg Tab, Take 1 tablet by mouth. (Patient not taking: Reported on 2/4/2025), Disp: , Rfl:     losartan (COZAAR) 25 MG tablet, Take 1 tablet (25 mg total) by mouth 2 (two) times a day., Disp: 180 tablet, Rfl: 1    oxyCODONE-acetaminophen (PERCOCET) 7.5-325 mg per tablet, Take 1 tablet by mouth every 12 (twelve) hours as needed for Pain. (Patient not taking: Reported on 2/4/2025), Disp: 30 tablet, Rfl: 0    Review of Systems   Constitutional: Negative for chills, diaphoresis, fever, malaise/fatigue, night sweats and weight gain.   HENT:  Negative for congestion and sore throat.    Eyes:  Negative for blurred vision and visual disturbance.   Cardiovascular:  Negative for chest pain, claudication, cyanosis, dyspnea on exertion (MILD), irregular heartbeat, leg swelling, near-syncope, orthopnea, palpitations, paroxysmal nocturnal  "dyspnea and syncope.   Respiratory:  Positive for cough (ASTHMA). Negative for hemoptysis, shortness of breath and wheezing (OCC).    Hematologic/Lymphatic: Negative for adenopathy. Does not bruise/bleed easily.   Skin:  Negative for color change and rash.   Musculoskeletal:  Positive for joint pain (L SHOULDER] R FOOT). Negative for falls. Neck pain: ONCE POSITIONAL.  Gastrointestinal:  Negative for abdominal pain, change in bowel habit, dysphagia, jaundice, melena and nausea.   Genitourinary:  Negative for dysuria and flank pain.   Neurological:  Positive for dizziness (OCC ORTHOSTASIS). Negative for brief paralysis, focal weakness, light-headedness, loss of balance, paresthesias and weakness.   Psychiatric/Behavioral:  Negative for altered mental status and depression.    Allergic/Immunologic: Negative for persistent infections.        Objective:      Vitals:    02/04/25 1442 02/04/25 1501   BP: (!) 187/79 (!) 148/64   Pulse: 70    Weight: 90.1 kg (198 lb 10.2 oz)    Height: 5' 4" (1.626 m)    PainSc: 0-No pain      Body mass index is 34.1 kg/m².    Physical Exam  Constitutional:       Appearance: She is well-developed. She is obese.   HENT:      Head: Normocephalic and atraumatic.   Eyes:      General: No scleral icterus.     Extraocular Movements: Extraocular movements intact.   Neck:      Vascular: Normal carotid pulses. No carotid bruit or JVD.   Cardiovascular:      Rate and Rhythm: Normal rate and regular rhythm. No extrasystoles are present.     Pulses:           Carotid pulses are 2+ on the right side and 2+ on the left side.       Radial pulses are 2+ on the right side and 2+ on the left side.        Posterior tibial pulses are 2+ on the right side and 2+ on the left side.      Heart sounds: Murmur heard.      Systolic murmur is present with a grade of 1/6 at the lower left sternal border and apex.      No friction rub. No gallop.   Pulmonary:      Effort: Pulmonary effort is normal.      Breath sounds: " Normal breath sounds. No rales.   Abdominal:      Palpations: Abdomen is soft. There is no hepatomegaly.      Tenderness: There is no abdominal tenderness.   Musculoskeletal:         General: Normal range of motion.      Cervical back: Neck supple.      Right lower leg: No edema.      Left lower leg: No edema.      Comments: VARICOSE VEINS   Skin:     General: Skin is warm and dry.      Capillary Refill: Capillary refill takes less than 2 seconds.      Coloration: Skin is pale.   Neurological:      General: No focal deficit present.      Mental Status: She is alert and oriented to person, place, and time.   Psychiatric:         Mood and Affect: Mood normal.         Speech: Speech normal.         Behavior: Behavior normal.                 ..    Chemistry        Component Value Date/Time     01/27/2025 0721     06/16/2015 0950    K 3.9 01/27/2025 0721    K 4.3 06/16/2015 0950     01/27/2025 0721     06/16/2015 0950    CO2 24 01/27/2025 0721    BUN 15 01/27/2025 0721    CREATININE 0.8 01/27/2025 0721    CREATININE 1.04 06/16/2015 0950    GLU 96 01/27/2025 0721        Component Value Date/Time    CALCIUM 9.3 01/27/2025 0721    ALKPHOS 79 01/27/2025 0721    AST 20 01/27/2025 0721    ALT 13 01/27/2025 0721    BILITOT 0.6 01/27/2025 0721    ESTGFRAFRICA >60.0 05/16/2022 0823    EGFRNONAA >60.0 05/16/2022 0823            ..  Lab Results   Component Value Date    CHOL 147 01/27/2025    CHOL 137 05/22/2023    CHOL 133 05/16/2022     Lab Results   Component Value Date    HDL 65 01/27/2025    HDL 64 05/22/2023    HDL 55 05/16/2022     Lab Results   Component Value Date    LDLCALC 67.2 01/27/2025    LDLCALC 54.0 (L) 05/22/2023    LDLCALC 61.4 (L) 05/16/2022     Lab Results   Component Value Date    TRIG 74 01/27/2025    TRIG 95 05/22/2023    TRIG 83 05/16/2022     Lab Results   Component Value Date    CHOLHDL 44.2 01/27/2025    CHOLHDL 46.7 05/22/2023    CHOLHDL 41.4 05/16/2022     ..  Lab Results    Component Value Date    WBC 6.30 05/22/2023    HGB 11.8 (L) 05/22/2023    HCT 35.7 (L) 05/22/2023    MCV 85 05/22/2023     (L) 05/22/2023       Test(s) Reviewed  I have reviewed the following in detail:  [] Stress test   [] Angiography   [] Echocardiogram   [x] Labs   [x] Other:       Assessment:         ICD-10-CM ICD-9-CM   1. Coronary artery disease involving native coronary artery of native heart with other form of angina pectoris  I25.118 414.01     413.9   2. HTN (hypertension), benign  I10 401.1   3. Non-rheumatic mitral regurgitation  I34.0 424.0   4. Mild carotid artery disease  I77.9 447.9   5. Obesity, Class I, BMI 30-34.9  E66.811 278.00   6. Stage 3a chronic kidney disease  N18.31 585.3     Problem List Items Addressed This Visit          Cardiac/Vascular    HTN (hypertension), benign    Non-rheumatic mitral regurgitation    Coronary artery disease involving native coronary artery of native heart with other form of angina pectoris - Primary    Relevant Orders    Comprehensive Metabolic Panel    CBC Auto Differential    Mild carotid artery disease       Renal/    Stage 3a chronic kidney disease    Relevant Orders    Comprehensive Metabolic Panel    Magnesium    CBC Auto Differential       Endocrine    Obesity, Class I, BMI 30-34.9        Plan:     INCREASE LOSARTAN TO 25 BID, WATCH BLOOD PRESSURE REPORTED TO ME, RETURN TO CLINIC IN 6 MO LABS    ALL CV CLINICALLY STABLE, CLASS ANGINA, NO HF, NO TIA, NO CLINICAL ARRHYTHMIA,CONTINUE CURRENT MEDS, EDUCATION, DIET, EXERCISE , WEIGHT LOSS WALKING EXERCISE OK FOR COMPRESSION STOCKINGS, DISCUSSED PLAN WITH THE PATIENT AND HER         Coronary artery disease involving native coronary artery of native heart with other form of angina pectoris  -     Comprehensive Metabolic Panel; Future; Expected date: 02/04/2025  -     CBC Auto Differential; Future; Expected date: 02/04/2025    HTN (hypertension), benign  Comments:  ADJUST MEDS    Non-rheumatic  mitral regurgitation    Mild carotid artery disease    Obesity, Class I, BMI 30-34.9    Stage 3a chronic kidney disease  -     Comprehensive Metabolic Panel; Future; Expected date: 02/04/2025  -     Magnesium; Future; Expected date: 02/04/2025  -     CBC Auto Differential; Future; Expected date: 02/04/2025    Other orders  -     losartan (COZAAR) 25 MG tablet; Take 1 tablet (25 mg total) by mouth 2 (two) times a day.  Dispense: 180 tablet; Refill: 1    RTC Low level/low impact aerobic exercise 5x's/wk. Heart healthy diet and risk factor modification.    See labs and med orders.    Aerobic exercise 5x's/wk. Heart healthy diet and risk factor modification.    See labs and med orders.

## 2025-02-04 ENCOUNTER — OFFICE VISIT (OUTPATIENT)
Dept: CARDIOLOGY | Facility: CLINIC | Age: 75
End: 2025-02-04
Payer: MEDICARE

## 2025-02-04 VITALS
HEIGHT: 64 IN | BODY MASS INDEX: 33.91 KG/M2 | DIASTOLIC BLOOD PRESSURE: 64 MMHG | SYSTOLIC BLOOD PRESSURE: 148 MMHG | HEART RATE: 70 BPM | WEIGHT: 198.63 LBS

## 2025-02-04 DIAGNOSIS — I10 HTN (HYPERTENSION), BENIGN: Chronic | ICD-10-CM

## 2025-02-04 DIAGNOSIS — E66.811 OBESITY, CLASS I, BMI 30-34.9: Chronic | ICD-10-CM

## 2025-02-04 DIAGNOSIS — I34.0 NON-RHEUMATIC MITRAL REGURGITATION: Chronic | ICD-10-CM

## 2025-02-04 DIAGNOSIS — I25.118 CORONARY ARTERY DISEASE INVOLVING NATIVE CORONARY ARTERY OF NATIVE HEART WITH OTHER FORM OF ANGINA PECTORIS: Primary | Chronic | ICD-10-CM

## 2025-02-04 DIAGNOSIS — N18.31 STAGE 3A CHRONIC KIDNEY DISEASE: Chronic | ICD-10-CM

## 2025-02-04 DIAGNOSIS — I77.9 MILD CAROTID ARTERY DISEASE: Chronic | ICD-10-CM

## 2025-02-04 PROCEDURE — 99999 PR PBB SHADOW E&M-EST. PATIENT-LVL IV: CPT | Mod: PBBFAC,,, | Performed by: INTERNAL MEDICINE

## 2025-02-04 PROCEDURE — 99214 OFFICE O/P EST MOD 30 MIN: CPT | Mod: S$GLB,,, | Performed by: INTERNAL MEDICINE

## 2025-02-04 PROCEDURE — 1159F MED LIST DOCD IN RCRD: CPT | Mod: CPTII,S$GLB,, | Performed by: INTERNAL MEDICINE

## 2025-02-04 PROCEDURE — 1101F PT FALLS ASSESS-DOCD LE1/YR: CPT | Mod: CPTII,S$GLB,, | Performed by: INTERNAL MEDICINE

## 2025-02-04 PROCEDURE — 1126F AMNT PAIN NOTED NONE PRSNT: CPT | Mod: CPTII,S$GLB,, | Performed by: INTERNAL MEDICINE

## 2025-02-04 PROCEDURE — 3288F FALL RISK ASSESSMENT DOCD: CPT | Mod: CPTII,S$GLB,, | Performed by: INTERNAL MEDICINE

## 2025-02-04 PROCEDURE — 3077F SYST BP >= 140 MM HG: CPT | Mod: CPTII,S$GLB,, | Performed by: INTERNAL MEDICINE

## 2025-02-04 PROCEDURE — 3078F DIAST BP <80 MM HG: CPT | Mod: CPTII,S$GLB,, | Performed by: INTERNAL MEDICINE

## 2025-02-04 RX ORDER — LOSARTAN POTASSIUM 25 MG/1
25 TABLET ORAL 2 TIMES DAILY
Qty: 180 TABLET | Refills: 1 | Status: SHIPPED | OUTPATIENT
Start: 2025-02-04 | End: 2026-02-04

## 2025-02-07 DIAGNOSIS — I25.118 CORONARY ARTERY DISEASE INVOLVING NATIVE CORONARY ARTERY OF NATIVE HEART WITH OTHER FORM OF ANGINA PECTORIS: Primary | ICD-10-CM

## 2025-02-07 RX ORDER — NITROGLYCERIN 0.4 MG/1
0.4 TABLET SUBLINGUAL EVERY 5 MIN PRN
Qty: 25 TABLET | Refills: 0 | Status: SHIPPED | OUTPATIENT
Start: 2025-02-07 | End: 2026-02-07

## 2025-02-19 DIAGNOSIS — I10 ESSENTIAL HYPERTENSION: ICD-10-CM

## 2025-02-19 DIAGNOSIS — E78.00 HYPERCHOLESTEROLEMIA: ICD-10-CM

## 2025-02-19 DIAGNOSIS — I25.118 CORONARY ARTERY DISEASE INVOLVING NATIVE CORONARY ARTERY OF NATIVE HEART WITH OTHER FORM OF ANGINA PECTORIS: Primary | ICD-10-CM

## 2025-02-19 DIAGNOSIS — I10 HTN (HYPERTENSION), BENIGN: ICD-10-CM

## 2025-02-20 RX ORDER — TRIAMTERENE AND HYDROCHLOROTHIAZIDE 37.5; 25 MG/1; MG/1
1 CAPSULE ORAL EVERY OTHER DAY
Qty: 45 CAPSULE | Refills: 1 | Status: SHIPPED | OUTPATIENT
Start: 2025-02-20

## 2025-02-20 NOTE — TELEPHONE ENCOUNTER
----- Message from Manuela sent at 2/19/2025  4:52 PM CST -----  Type:  RX Refill RequestWho Called:  PTRefill or New Rx: refillRX Name and Strength: Disp Refills Start End triamterene-hydrochlorothiazide 37.5-25 mg (DYAZIDE) 37.5-25 mg per capsule 45 capsule 3 12/29/2022 - Sig: TAKE 1 CAPSULE BY MOUTH  EVERY OTHER DAY Sent to pharmacy as: triamterene-hydrochlorothiazide 37.5-25 mg (DYAZIDE) 37.5-25 mg per capsule Notes to Pharmacy: Requesting 1 year supply E-Prescribing Status: Receipt confir How is the patient currently taking it? (ex. 1XDay):see aboveIs this a 30 day or 90 day RX:see abovePreferred Pharmacy with phone number: [facility] [facility] [address] [place], [place] - [address] [facility] [place] [address]Zqryw: [phone] Fax: [phone] [address] [facility] [id number] [address]Hgsja: [phone] Fax: [phone] or Mail Order:localOrdering Provider:ADrWould the patient rather a call back or a response via [facility]? callBe Call Back Number:[phone] Additional Information:  Please call back to advise. Thank you!

## 2025-02-20 NOTE — TELEPHONE ENCOUNTER
----- Message from Haorellhonorio sent at 2/20/2025  9:16 AM CST -----  Regarding: Refill  Type:  RX Refill RequestWho Called: pharm tech Refill or New Rx:refillRX Name and Strength:triamterene-hydrochlorothiazide 37.5-25 mg (DYAZIDE) 37.5-25 mg per capsuleHow is the patient currently taking it? (ex. 1XDay):as directed Is this a 30 day or 90 day RX:90Preferred Pharmacy with phone number:[place], [place] - [address] [facility] [id number] [facility] [address] [address]Lmxtw: [phone] Fax: [phone] or Mail Order:local Ordering Provider:Oneal Would the patient rather a call back or a response via [facility]? Call back Best Call Back Number:[phone] Additional Information:    please call to advise, Thank You.

## 2025-03-04 DIAGNOSIS — I10 ESSENTIAL HYPERTENSION: ICD-10-CM

## 2025-03-05 RX ORDER — VERAPAMIL HYDROCHLORIDE 120 MG/1
120 CAPSULE, EXTENDED RELEASE ORAL
Qty: 90 CAPSULE | Refills: 0 | Status: SHIPPED | OUTPATIENT
Start: 2025-03-05

## 2025-03-17 ENCOUNTER — TELEPHONE (OUTPATIENT)
Dept: CARDIOLOGY | Facility: CLINIC | Age: 75
End: 2025-03-17
Payer: MEDICARE

## 2025-03-17 NOTE — TELEPHONE ENCOUNTER
Spoke with pt and she stated she has being have burping and heart burns a lot for the past two weeks.  Pt states she has been taking all her medication for it and has no help and now she doesn't know what to do.  Pt is asking for help.

## 2025-03-17 NOTE — TELEPHONE ENCOUNTER
----- Message from Niesha sent at 3/17/2025  2:47 PM CDT -----  Contact: [phone]  Type:  Patient Requesting a Call BackWho Called:PtDoes the patient know what this is regarding?:Pt states she is having a few problems she needs to discussWould the patient rather a call back or a response via [facility]? Call [facility] Call Back Number: [phone] Information: Pt asking for a call back as soon as possible Pls

## 2025-05-14 DIAGNOSIS — E78.00 HYPERCHOLESTEROLEMIA: ICD-10-CM

## 2025-05-14 RX ORDER — ROSUVASTATIN CALCIUM 20 MG/1
20 TABLET, COATED ORAL NIGHTLY
Qty: 90 TABLET | Refills: 1 | Status: SHIPPED | OUTPATIENT
Start: 2025-05-14

## 2025-05-14 NOTE — TELEPHONE ENCOUNTER
----- Message from Pricilla sent at 5/14/2025  8:28 AM CDT -----  Contact: self  Type:  RX Refill RequestWho Called:  the patientRefill or New Rx:  newRX Name and Strength:  rosuvastatin (CRESTOR) 20 MG tabletHow is the patient currently taking it? (ex. 1XDay):  as directedIs this a 30 day or 90 day RX:  90Preferred Pharmacy with phone number:  [facility] [id number] [facility] [id number] [facility] [address] [id number]: [phone] Fax: [phone] Local or Mail Order:  localOrdering Provider:  Victoria Call Back Number:  [phone] Information:  pt asking for a new script for 90 days to be called in, pt is almost out. Please call

## 2025-06-16 DIAGNOSIS — I10 ESSENTIAL HYPERTENSION: ICD-10-CM

## 2025-06-16 RX ORDER — VERAPAMIL HYDROCHLORIDE 120 MG/1
120 CAPSULE, EXTENDED RELEASE ORAL
Qty: 90 CAPSULE | Refills: 0 | Status: SHIPPED | OUTPATIENT
Start: 2025-06-16 | End: 2025-06-16 | Stop reason: SDUPTHER

## 2025-06-16 RX ORDER — VERAPAMIL HYDROCHLORIDE 120 MG/1
120 CAPSULE, EXTENDED RELEASE ORAL DAILY
Qty: 90 CAPSULE | Refills: 0 | Status: SHIPPED | OUTPATIENT
Start: 2025-06-16

## 2025-06-18 ENCOUNTER — TELEPHONE (OUTPATIENT)
Dept: PODIATRY | Facility: CLINIC | Age: 75
End: 2025-06-18
Payer: MEDICARE

## 2025-08-04 ENCOUNTER — LAB VISIT (OUTPATIENT)
Dept: LAB | Facility: HOSPITAL | Age: 75
End: 2025-08-04
Attending: INTERNAL MEDICINE
Payer: MEDICARE

## 2025-08-04 DIAGNOSIS — I25.118 CORONARY ARTERY DISEASE INVOLVING NATIVE CORONARY ARTERY OF NATIVE HEART WITH OTHER FORM OF ANGINA PECTORIS: ICD-10-CM

## 2025-08-04 DIAGNOSIS — N18.31 STAGE 3A CHRONIC KIDNEY DISEASE: ICD-10-CM

## 2025-08-04 LAB
ABSOLUTE EOSINOPHIL (OHS): 0.36 K/UL
ABSOLUTE MONOCYTE (OHS): 0.7 K/UL (ref 0.3–1)
ABSOLUTE NEUTROPHIL COUNT (OHS): 3.78 K/UL (ref 1.8–7.7)
ALBUMIN SERPL BCP-MCNC: 3.5 G/DL (ref 3.5–5.2)
ALP SERPL-CCNC: 86 UNIT/L (ref 40–150)
ALT SERPL W/O P-5'-P-CCNC: 20 UNIT/L (ref 0–55)
ANION GAP (OHS): 8 MMOL/L (ref 8–16)
AST SERPL-CCNC: 26 UNIT/L (ref 0–50)
BASOPHILS # BLD AUTO: 0.05 K/UL
BASOPHILS NFR BLD AUTO: 0.8 %
BILIRUB SERPL-MCNC: 0.5 MG/DL (ref 0.1–1)
BUN SERPL-MCNC: 10 MG/DL (ref 8–23)
CALCIUM SERPL-MCNC: 9.2 MG/DL (ref 8.7–10.5)
CHLORIDE SERPL-SCNC: 99 MMOL/L (ref 95–110)
CO2 SERPL-SCNC: 24 MMOL/L (ref 23–29)
CREAT SERPL-MCNC: 0.7 MG/DL (ref 0.5–1.4)
ERYTHROCYTE [DISTWIDTH] IN BLOOD BY AUTOMATED COUNT: 12.8 % (ref 11.5–14.5)
GFR SERPLBLD CREATININE-BSD FMLA CKD-EPI: >60 ML/MIN/1.73/M2
GLUCOSE SERPL-MCNC: 74 MG/DL (ref 70–110)
HCT VFR BLD AUTO: 34.9 % (ref 37–48.5)
HGB BLD-MCNC: 11.6 GM/DL (ref 12–16)
IMM GRANULOCYTES # BLD AUTO: 0.02 K/UL (ref 0–0.04)
IMM GRANULOCYTES NFR BLD AUTO: 0.3 % (ref 0–0.5)
LYMPHOCYTES # BLD AUTO: 1.26 K/UL (ref 1–4.8)
MAGNESIUM SERPL-MCNC: 1.7 MG/DL (ref 1.6–2.6)
MCH RBC QN AUTO: 29.1 PG (ref 27–31)
MCHC RBC AUTO-ENTMCNC: 33.2 G/DL (ref 32–36)
MCV RBC AUTO: 88 FL (ref 82–98)
NUCLEATED RBC (/100WBC) (OHS): 0 /100 WBC
PLATELET # BLD AUTO: 157 K/UL (ref 150–450)
PMV BLD AUTO: 10.7 FL (ref 9.2–12.9)
POTASSIUM SERPL-SCNC: 4 MMOL/L (ref 3.5–5.1)
PROT SERPL-MCNC: 6.3 GM/DL (ref 6–8.4)
RBC # BLD AUTO: 3.98 M/UL (ref 4–5.4)
RELATIVE EOSINOPHIL (OHS): 5.8 %
RELATIVE LYMPHOCYTE (OHS): 20.4 % (ref 18–48)
RELATIVE MONOCYTE (OHS): 11.3 % (ref 4–15)
RELATIVE NEUTROPHIL (OHS): 61.4 % (ref 38–73)
SODIUM SERPL-SCNC: 131 MMOL/L (ref 136–145)
WBC # BLD AUTO: 6.17 K/UL (ref 3.9–12.7)

## 2025-08-04 PROCEDURE — 85025 COMPLETE CBC W/AUTO DIFF WBC: CPT

## 2025-08-04 PROCEDURE — 83735 ASSAY OF MAGNESIUM: CPT

## 2025-08-04 PROCEDURE — 80053 COMPREHEN METABOLIC PANEL: CPT

## 2025-08-04 PROCEDURE — 36415 COLL VENOUS BLD VENIPUNCTURE: CPT | Mod: PO

## 2025-08-14 DIAGNOSIS — E78.00 HYPERCHOLESTEROLEMIA: ICD-10-CM

## 2025-08-14 DIAGNOSIS — I10 ESSENTIAL HYPERTENSION: ICD-10-CM

## 2025-08-14 DIAGNOSIS — I10 HTN (HYPERTENSION), BENIGN: ICD-10-CM

## 2025-08-14 DIAGNOSIS — I25.118 CORONARY ARTERY DISEASE INVOLVING NATIVE CORONARY ARTERY OF NATIVE HEART WITH OTHER FORM OF ANGINA PECTORIS: ICD-10-CM

## 2025-08-15 ENCOUNTER — OFFICE VISIT (OUTPATIENT)
Dept: CARDIOLOGY | Facility: CLINIC | Age: 75
End: 2025-08-15
Payer: MEDICARE

## 2025-08-15 VITALS
DIASTOLIC BLOOD PRESSURE: 56 MMHG | WEIGHT: 199.75 LBS | BODY MASS INDEX: 34.1 KG/M2 | HEART RATE: 58 BPM | SYSTOLIC BLOOD PRESSURE: 138 MMHG | HEIGHT: 64 IN

## 2025-08-15 DIAGNOSIS — E11.69 COMBINED HYPERLIPIDEMIA ASSOCIATED WITH TYPE 2 DIABETES MELLITUS: Chronic | ICD-10-CM

## 2025-08-15 DIAGNOSIS — R06.02 SOB (SHORTNESS OF BREATH): Primary | ICD-10-CM

## 2025-08-15 DIAGNOSIS — E78.2 COMBINED HYPERLIPIDEMIA ASSOCIATED WITH TYPE 2 DIABETES MELLITUS: Chronic | ICD-10-CM

## 2025-08-15 DIAGNOSIS — R09.89 ARTERIAL BRUIT: ICD-10-CM

## 2025-08-15 DIAGNOSIS — I25.118 CORONARY ARTERY DISEASE INVOLVING NATIVE CORONARY ARTERY OF NATIVE HEART WITH OTHER FORM OF ANGINA PECTORIS: Chronic | ICD-10-CM

## 2025-08-15 DIAGNOSIS — E66.811 OBESITY, CLASS I, BMI 30-34.9: ICD-10-CM

## 2025-08-15 DIAGNOSIS — I34.0 NON-RHEUMATIC MITRAL REGURGITATION: Chronic | ICD-10-CM

## 2025-08-15 DIAGNOSIS — R29.898 LEG WEAKNESS, BILATERAL: ICD-10-CM

## 2025-08-15 DIAGNOSIS — R53.82 CHRONIC FATIGUE: Chronic | ICD-10-CM

## 2025-08-15 DIAGNOSIS — I51.7 LAE (LEFT ATRIAL ENLARGEMENT): Chronic | ICD-10-CM

## 2025-08-15 LAB
OHS QRS DURATION: 84 MS
OHS QTC CALCULATION: 407 MS

## 2025-08-15 PROCEDURE — 1126F AMNT PAIN NOTED NONE PRSNT: CPT | Mod: CPTII,S$GLB,, | Performed by: INTERNAL MEDICINE

## 2025-08-15 PROCEDURE — 1159F MED LIST DOCD IN RCRD: CPT | Mod: CPTII,S$GLB,, | Performed by: INTERNAL MEDICINE

## 2025-08-15 PROCEDURE — 93010 ELECTROCARDIOGRAM REPORT: CPT | Mod: S$GLB,,, | Performed by: INTERNAL MEDICINE

## 2025-08-15 PROCEDURE — 3075F SYST BP GE 130 - 139MM HG: CPT | Mod: CPTII,S$GLB,, | Performed by: INTERNAL MEDICINE

## 2025-08-15 PROCEDURE — 1101F PT FALLS ASSESS-DOCD LE1/YR: CPT | Mod: CPTII,S$GLB,, | Performed by: INTERNAL MEDICINE

## 2025-08-15 PROCEDURE — 99999 PR PBB SHADOW E&M-EST. PATIENT-LVL V: CPT | Mod: PBBFAC,,, | Performed by: INTERNAL MEDICINE

## 2025-08-15 PROCEDURE — 3288F FALL RISK ASSESSMENT DOCD: CPT | Mod: CPTII,S$GLB,, | Performed by: INTERNAL MEDICINE

## 2025-08-15 PROCEDURE — 99214 OFFICE O/P EST MOD 30 MIN: CPT | Mod: 25,S$GLB,, | Performed by: INTERNAL MEDICINE

## 2025-08-15 PROCEDURE — 3078F DIAST BP <80 MM HG: CPT | Mod: CPTII,S$GLB,, | Performed by: INTERNAL MEDICINE

## 2025-08-16 PROBLEM — R53.82 CHRONIC FATIGUE: Status: ACTIVE | Noted: 2025-08-16

## 2025-08-16 PROBLEM — I51.7 LAE (LEFT ATRIAL ENLARGEMENT): Chronic | Status: ACTIVE | Noted: 2025-08-16

## 2025-08-18 RX ORDER — TRIAMTERENE AND HYDROCHLOROTHIAZIDE 37.5; 25 MG/1; MG/1
1 CAPSULE ORAL EVERY OTHER DAY
Qty: 45 CAPSULE | Refills: 1 | Status: SHIPPED | OUTPATIENT
Start: 2025-08-18

## 2025-08-25 ENCOUNTER — PATIENT MESSAGE (OUTPATIENT)
Dept: CARDIOLOGY | Facility: HOSPITAL | Age: 75
End: 2025-08-25
Payer: MEDICARE

## 2025-08-28 ENCOUNTER — HOSPITAL ENCOUNTER (OUTPATIENT)
Dept: CARDIOLOGY | Facility: HOSPITAL | Age: 75
Discharge: HOME | End: 2025-08-28
Attending: INTERNAL MEDICINE
Payer: MEDICARE

## 2025-08-28 ENCOUNTER — HOSPITAL ENCOUNTER (OUTPATIENT)
Dept: RADIOLOGY | Facility: HOSPITAL | Age: 75
Discharge: HOME | End: 2025-08-28
Attending: INTERNAL MEDICINE
Payer: MEDICARE

## 2025-08-28 VITALS — BODY MASS INDEX: 34.28 KG/M2 | HEIGHT: 64 IN

## 2025-08-28 DIAGNOSIS — R06.02 SOB (SHORTNESS OF BREATH): ICD-10-CM

## 2025-08-28 DIAGNOSIS — I25.118 CORONARY ARTERY DISEASE INVOLVING NATIVE CORONARY ARTERY OF NATIVE HEART WITH OTHER FORM OF ANGINA PECTORIS: Chronic | ICD-10-CM

## 2025-08-28 PROCEDURE — 93018 CV STRESS TEST I&R ONLY: CPT | Mod: ,,, | Performed by: INTERNAL MEDICINE

## 2025-08-28 PROCEDURE — 93017 CV STRESS TEST TRACING ONLY: CPT | Mod: PO

## 2025-08-28 PROCEDURE — 78452 HT MUSCLE IMAGE SPECT MULT: CPT | Mod: 26,,, | Performed by: INTERNAL MEDICINE

## 2025-08-28 PROCEDURE — A9502 TC99M TETROFOSMIN: HCPCS | Mod: PO | Performed by: INTERNAL MEDICINE

## 2025-08-28 PROCEDURE — 78452 HT MUSCLE IMAGE SPECT MULT: CPT | Mod: PO

## 2025-08-28 PROCEDURE — 63600175 PHARM REV CODE 636 W HCPCS: Mod: PO | Performed by: INTERNAL MEDICINE

## 2025-08-28 PROCEDURE — 93016 CV STRESS TEST SUPVJ ONLY: CPT | Mod: ,,, | Performed by: INTERNAL MEDICINE

## 2025-08-28 RX ORDER — REGADENOSON 0.08 MG/ML
0.4 INJECTION, SOLUTION INTRAVENOUS
Status: COMPLETED | OUTPATIENT
Start: 2025-08-28 | End: 2025-08-28

## 2025-08-28 RX ADMIN — REGADENOSON 0.4 MG: 0.08 INJECTION, SOLUTION INTRAVENOUS at 02:08

## 2025-08-28 RX ADMIN — TETROFOSMIN 10.5 MILLICURIE: 1.38 INJECTION, POWDER, LYOPHILIZED, FOR SOLUTION INTRAVENOUS at 02:08

## 2025-08-28 RX ADMIN — TETROFOSMIN 33 MILLICURIE: 1.38 INJECTION, POWDER, LYOPHILIZED, FOR SOLUTION INTRAVENOUS at 02:08

## 2025-08-29 ENCOUNTER — HOSPITAL ENCOUNTER (OUTPATIENT)
Dept: CARDIOLOGY | Facility: HOSPITAL | Age: 75
Discharge: HOME | End: 2025-08-29
Attending: INTERNAL MEDICINE
Payer: MEDICARE

## 2025-09-01 LAB
CV PHARM DOSE: 0.4 MG
CV STRESS BASE HR: 77 BPM
DIASTOLIC BLOOD PRESSURE: 67 MMHG
NUC REST EJECTION FRACTION: 61
OHS CV CPX 1 MINUTE RECOVERY HEART RATE: 92 BPM
OHS CV CPX 85 PERCENT MAX PREDICTED HEART RATE MALE: 122
OHS CV CPX MAX PREDICTED HEART RATE: 144
OHS CV CPX PATIENT HEIGHT IN: 64
OHS CV CPX PATIENT IS FEMALE: 1
OHS CV CPX PATIENT IS MALE: 0
OHS CV CPX PEAK DIASTOLIC BLOOD PRESSURE: 67 MMHG
OHS CV CPX PEAK HEAR RATE: 93 BPM
OHS CV CPX PEAK RATE PRESSURE PRODUCT: NORMAL
OHS CV CPX PEAK SYSTOLIC BLOOD PRESSURE: 188 MMHG
OHS CV CPX PERCENT MAX PREDICTED HEART RATE ACHIEVED: 67
OHS CV CPX RATE PRESSURE PRODUCT PRESENTING: NORMAL
OHS CV INITIAL DOSE: 10.5 MCG/KG/MIN
OHS CV PEAK DOSE: 33 MCG/KG/MIN
OHS CV PHARM TIME: 1439 MIN
SYSTOLIC BLOOD PRESSURE: 188 MMHG